# Patient Record
Sex: MALE | Race: WHITE | NOT HISPANIC OR LATINO
[De-identification: names, ages, dates, MRNs, and addresses within clinical notes are randomized per-mention and may not be internally consistent; named-entity substitution may affect disease eponyms.]

---

## 2018-09-17 PROBLEM — Z00.00 ENCOUNTER FOR PREVENTIVE HEALTH EXAMINATION: Status: ACTIVE | Noted: 2018-09-17

## 2018-09-19 ENCOUNTER — APPOINTMENT (OUTPATIENT)
Dept: UROLOGY | Facility: CLINIC | Age: 78
End: 2018-09-19
Payer: MEDICARE

## 2018-09-19 VITALS
TEMPERATURE: 98.1 F | SYSTOLIC BLOOD PRESSURE: 142 MMHG | OXYGEN SATURATION: 98 % | BODY MASS INDEX: 31.84 KG/M2 | DIASTOLIC BLOOD PRESSURE: 72 MMHG | HEART RATE: 90 BPM | HEIGHT: 69 IN | WEIGHT: 215 LBS

## 2018-09-19 DIAGNOSIS — Z78.9 OTHER SPECIFIED HEALTH STATUS: ICD-10-CM

## 2018-09-19 DIAGNOSIS — Z80.1 FAMILY HISTORY OF MALIGNANT NEOPLASM OF TRACHEA, BRONCHUS AND LUNG: ICD-10-CM

## 2018-09-19 DIAGNOSIS — Z86.79 PERSONAL HISTORY OF OTHER DISEASES OF THE CIRCULATORY SYSTEM: ICD-10-CM

## 2018-09-19 DIAGNOSIS — Z87.19 PERSONAL HISTORY OF OTHER DISEASES OF THE DIGESTIVE SYSTEM: ICD-10-CM

## 2018-09-19 PROCEDURE — 99204 OFFICE O/P NEW MOD 45 MIN: CPT

## 2018-09-19 RX ORDER — KRILL/OM-3/DHA/EPA/PHOSPHO/AST 1000-230MG
81 CAPSULE ORAL
Refills: 0 | Status: ACTIVE | COMMUNITY

## 2018-09-19 RX ORDER — AMLODIPINE BESYLATE 2.5 MG/1
2.5 TABLET ORAL
Refills: 0 | Status: ACTIVE | COMMUNITY

## 2018-09-20 LAB
APPEARANCE: CLEAR
BACTERIA: NEGATIVE
BILIRUBIN URINE: NEGATIVE
BLOOD URINE: NEGATIVE
COLOR: YELLOW
GLUCOSE QUALITATIVE U: NEGATIVE MG/DL
HYALINE CASTS: 2 /LPF
KETONES URINE: NEGATIVE
LEUKOCYTE ESTERASE URINE: NEGATIVE
MICROSCOPIC-UA: NORMAL
NITRITE URINE: NEGATIVE
PH URINE: 6
PROTEIN URINE: NEGATIVE MG/DL
RED BLOOD CELLS URINE: 4 /HPF
SPECIFIC GRAVITY URINE: 1.02
SQUAMOUS EPITHELIAL CELLS: 1 /HPF
UROBILINOGEN URINE: NEGATIVE MG/DL
WHITE BLOOD CELLS URINE: 1 /HPF

## 2018-09-25 ENCOUNTER — APPOINTMENT (OUTPATIENT)
Dept: UROLOGY | Facility: CLINIC | Age: 78
End: 2018-09-25
Payer: MEDICARE

## 2018-09-25 VITALS
OXYGEN SATURATION: 97 % | DIASTOLIC BLOOD PRESSURE: 78 MMHG | RESPIRATION RATE: 16 BRPM | SYSTOLIC BLOOD PRESSURE: 122 MMHG | HEART RATE: 73 BPM

## 2018-09-25 LAB
BACTERIA UR CULT: NORMAL
PSA FREE FLD-MCNC: 12.9
PSA FREE SERPL-MCNC: 1.04 NG/ML
PSA SERPL-MCNC: 8.07 NG/ML

## 2018-09-25 PROCEDURE — 99214 OFFICE O/P EST MOD 30 MIN: CPT

## 2018-09-25 RX ORDER — SILDENAFIL 20 MG/1
20 TABLET ORAL DAILY
Qty: 60 | Refills: 2 | Status: ACTIVE | COMMUNITY
Start: 2018-09-25 | End: 1900-01-01

## 2018-10-30 ENCOUNTER — APPOINTMENT (OUTPATIENT)
Dept: UROLOGY | Facility: CLINIC | Age: 78
End: 2018-10-30
Payer: MEDICARE

## 2018-10-30 VITALS
OXYGEN SATURATION: 97 % | DIASTOLIC BLOOD PRESSURE: 68 MMHG | SYSTOLIC BLOOD PRESSURE: 128 MMHG | BODY MASS INDEX: 31.4 KG/M2 | HEART RATE: 67 BPM | WEIGHT: 212 LBS | HEIGHT: 69 IN

## 2018-10-30 LAB
PSA FREE FLD-MCNC: 13.5
PSA FREE SERPL-MCNC: 0.92 NG/ML
PSA SERPL-MCNC: 6.82 NG/ML

## 2018-10-30 PROCEDURE — 99214 OFFICE O/P EST MOD 30 MIN: CPT

## 2019-03-12 ENCOUNTER — APPOINTMENT (OUTPATIENT)
Dept: UROLOGY | Facility: CLINIC | Age: 79
End: 2019-03-12

## 2019-04-24 ENCOUNTER — MESSAGE (OUTPATIENT)
Age: 79
End: 2019-04-24

## 2019-04-26 LAB — PSA SERPL-MCNC: 6.12 NG/ML

## 2019-05-01 ENCOUNTER — APPOINTMENT (OUTPATIENT)
Dept: UROLOGY | Facility: CLINIC | Age: 79
End: 2019-05-01
Payer: MEDICARE

## 2019-05-01 VITALS
SYSTOLIC BLOOD PRESSURE: 162 MMHG | DIASTOLIC BLOOD PRESSURE: 78 MMHG | RESPIRATION RATE: 16 BRPM | BODY MASS INDEX: 31.84 KG/M2 | WEIGHT: 215 LBS | HEIGHT: 69 IN | HEART RATE: 58 BPM

## 2019-05-01 DIAGNOSIS — N13.8 BENIGN PROSTATIC HYPERPLASIA WITH LOWER URINARY TRACT SYMPMS: ICD-10-CM

## 2019-05-01 DIAGNOSIS — R97.20 ELEVATED PROSTATE, SPECIFIC ANTIGEN [PSA]: ICD-10-CM

## 2019-05-01 DIAGNOSIS — N40.1 BENIGN PROSTATIC HYPERPLASIA WITH LOWER URINARY TRACT SYMPMS: ICD-10-CM

## 2019-05-01 DIAGNOSIS — N52.9 MALE ERECTILE DYSFUNCTION, UNSPECIFIED: ICD-10-CM

## 2019-05-01 PROCEDURE — 99214 OFFICE O/P EST MOD 30 MIN: CPT

## 2019-05-01 NOTE — PHYSICAL EXAM
[Normal Appearance] : normal appearance [General Appearance - Well Nourished] : well nourished [General Appearance - Well Developed] : well developed [General Appearance - In No Acute Distress] : no acute distress [Well Groomed] : well groomed [Abdomen Soft] : soft [Abdomen Tenderness] : non-tender [Costovertebral Angle Tenderness] : no ~M costovertebral angle tenderness [Urinary Bladder Findings] : the bladder was normal on palpation [Urethral Meatus] : meatus normal [Scrotum] : the scrotum was normal [Testes Mass (___cm)] : there were no testicular masses [No Prostate Nodules] : no prostate nodules [] : no respiratory distress [Edema] : no peripheral edema [Respiration, Rhythm And Depth] : normal respiratory rhythm and effort [Oriented To Time, Place, And Person] : oriented to person, place, and time [Exaggerated Use Of Accessory Muscles For Inspiration] : no accessory muscle use [Mood] : the mood was normal [Affect] : the affect was normal [Not Anxious] : not anxious [No Focal Deficits] : no focal deficits [Normal Station and Gait] : the gait and station were normal for the patient's age [No Palpable Adenopathy] : no palpable adenopathy

## 2019-05-01 NOTE — ASSESSMENT
[FreeTextEntry1] : Very pleasant 79-year-old gentleman presents for followup of elevated PSA and erectile\par -Labs reviewed with the patient\par -Given decreasing PSA, we will forego an additional workup for elevated PSA at this time\par -We had an extensive discussion of the potential options for treatment of erectile dysfunction\par -Patient is comfortable at this time and would like to forego initial treatments\par -Follow up in one year with PSA prior to visit at patient's request

## 2019-05-01 NOTE — HISTORY OF PRESENT ILLNESS
[FreeTextEntry1] : Very pleasant 79-year-old gentleman presents for followup of elevated PSA and erectile dysfunction. Patient feels well. He denies dysuria. No hematuria. Urinary symptoms are minimal. He recently had a PSA to 6.12. This is down from 6.8-6 months ago.\par \par Patient reports difficulty achieving an erection. He is tried up to 100 mg of sildenafil without significant improvement in his erections. [Urinary Frequency] : no urinary frequency [Urinary Urgency] : no urinary urgency [Urinary Retention] : no urinary retention [Nocturia] : no nocturia [Straining] : no straining [Erectile Dysfunction] : Erectile Dysfunction [Weak Stream] : no weak stream [Dysuria] : no dysuria [Hematuria - Gross] : no gross hematuria [Bladder Spasm] : no bladder spasm [Abdominal Pain] : no abdominal pain [Flank Pain] : no flank pain [Fever] : no fever [Nausea] : no nausea [Fatigue] : no fatigue [Anorexia] : no anorexia

## 2020-05-01 ENCOUNTER — APPOINTMENT (OUTPATIENT)
Dept: UROLOGY | Facility: CLINIC | Age: 80
End: 2020-05-01

## 2021-10-19 ENCOUNTER — APPOINTMENT (OUTPATIENT)
Dept: RADIOLOGY | Facility: HOSPITAL | Age: 81
End: 2021-10-19

## 2023-01-01 ENCOUNTER — APPOINTMENT (OUTPATIENT)
Dept: SURGICAL ONCOLOGY | Facility: CLINIC | Age: 83
End: 2023-01-01
Payer: MEDICARE

## 2023-01-01 ENCOUNTER — APPOINTMENT (OUTPATIENT)
Dept: SURGERY | Facility: CLINIC | Age: 83
End: 2023-01-01
Payer: MEDICARE

## 2023-01-01 VITALS
HEIGHT: 69 IN | BODY MASS INDEX: 27.11 KG/M2 | WEIGHT: 183 LBS | SYSTOLIC BLOOD PRESSURE: 140 MMHG | DIASTOLIC BLOOD PRESSURE: 63 MMHG | HEART RATE: 72 BPM

## 2023-01-01 VITALS
RESPIRATION RATE: 16 BRPM | OXYGEN SATURATION: 96 % | DIASTOLIC BLOOD PRESSURE: 62 MMHG | SYSTOLIC BLOOD PRESSURE: 133 MMHG | HEIGHT: 69 IN | WEIGHT: 183 LBS | HEART RATE: 96 BPM | BODY MASS INDEX: 27.11 KG/M2

## 2023-01-01 DIAGNOSIS — M19.90 UNSPECIFIED OSTEOARTHRITIS, UNSPECIFIED SITE: ICD-10-CM

## 2023-01-01 DIAGNOSIS — I63.9 CEREBRAL INFARCTION, UNSPECIFIED: ICD-10-CM

## 2023-01-01 DIAGNOSIS — R06.02 SHORTNESS OF BREATH: ICD-10-CM

## 2023-01-01 DIAGNOSIS — R22.41 LOCALIZED SWELLING, MASS AND LUMP, RIGHT LOWER LIMB: ICD-10-CM

## 2023-01-01 PROCEDURE — 99203 OFFICE O/P NEW LOW 30 MIN: CPT

## 2023-01-01 PROCEDURE — 99215 OFFICE O/P EST HI 40 MIN: CPT

## 2023-11-30 PROBLEM — I63.9 STROKE: Status: ACTIVE | Noted: 2023-01-01

## 2023-11-30 PROBLEM — M19.90 ARTHRITIS: Status: ACTIVE | Noted: 2023-01-01

## 2023-12-05 PROBLEM — R22.41 MASS OF RIGHT THIGH: Status: ACTIVE | Noted: 2023-01-01

## 2023-12-05 PROBLEM — R06.02 SHORTNESS OF BREATH AT REST: Status: RESOLVED | Noted: 2023-01-01 | Resolved: 2023-01-01

## 2024-01-01 ENCOUNTER — APPOINTMENT (OUTPATIENT)
Dept: SURGICAL ONCOLOGY | Facility: HOSPITAL | Age: 84
End: 2024-01-01

## 2024-01-01 ENCOUNTER — INPATIENT (INPATIENT)
Facility: HOSPITAL | Age: 84
LOS: 21 days | DRG: 542 | End: 2024-01-26
Attending: STUDENT IN AN ORGANIZED HEALTH CARE EDUCATION/TRAINING PROGRAM | Admitting: STUDENT IN AN ORGANIZED HEALTH CARE EDUCATION/TRAINING PROGRAM
Payer: COMMERCIAL

## 2024-01-01 VITALS — TEMPERATURE: 101 F

## 2024-01-01 VITALS
DIASTOLIC BLOOD PRESSURE: 58 MMHG | HEIGHT: 67 IN | HEART RATE: 96 BPM | TEMPERATURE: 98 F | SYSTOLIC BLOOD PRESSURE: 109 MMHG | RESPIRATION RATE: 17 BRPM | WEIGHT: 169.98 LBS | OXYGEN SATURATION: 97 %

## 2024-01-01 DIAGNOSIS — I10 ESSENTIAL (PRIMARY) HYPERTENSION: ICD-10-CM

## 2024-01-01 DIAGNOSIS — C79.9 SECONDARY MALIGNANT NEOPLASM OF UNSPECIFIED SITE: ICD-10-CM

## 2024-01-01 DIAGNOSIS — I48.91 UNSPECIFIED ATRIAL FIBRILLATION: ICD-10-CM

## 2024-01-01 DIAGNOSIS — Z02.9 ENCOUNTER FOR ADMINISTRATIVE EXAMINATIONS, UNSPECIFIED: ICD-10-CM

## 2024-01-01 DIAGNOSIS — E83.52 HYPERCALCEMIA: ICD-10-CM

## 2024-01-01 DIAGNOSIS — Z51.5 ENCOUNTER FOR PALLIATIVE CARE: ICD-10-CM

## 2024-01-01 DIAGNOSIS — F01.50 VASCULAR DEMENTIA WITHOUT BEHAVIORAL DISTURBANCE: ICD-10-CM

## 2024-01-01 DIAGNOSIS — D72.829 ELEVATED WHITE BLOOD CELL COUNT, UNSPECIFIED: ICD-10-CM

## 2024-01-01 DIAGNOSIS — E43 UNSPECIFIED SEVERE PROTEIN-CALORIE MALNUTRITION: ICD-10-CM

## 2024-01-01 DIAGNOSIS — Z29.9 ENCOUNTER FOR PROPHYLACTIC MEASURES, UNSPECIFIED: ICD-10-CM

## 2024-01-01 DIAGNOSIS — G89.3 NEOPLASM RELATED PAIN (ACUTE) (CHRONIC): ICD-10-CM

## 2024-01-01 DIAGNOSIS — R42 DIZZINESS AND GIDDINESS: ICD-10-CM

## 2024-01-01 DIAGNOSIS — E87.0 HYPEROSMOLALITY AND HYPERNATREMIA: ICD-10-CM

## 2024-01-01 DIAGNOSIS — J43.9 EMPHYSEMA, UNSPECIFIED: ICD-10-CM

## 2024-01-01 DIAGNOSIS — R26.2 DIFFICULTY IN WALKING, NOT ELSEWHERE CLASSIFIED: ICD-10-CM

## 2024-01-01 DIAGNOSIS — C80.1 MALIGNANT (PRIMARY) NEOPLASM, UNSPECIFIED: ICD-10-CM

## 2024-01-01 DIAGNOSIS — R45.1 RESTLESSNESS AND AGITATION: ICD-10-CM

## 2024-01-01 DIAGNOSIS — R53.1 WEAKNESS: ICD-10-CM

## 2024-01-01 DIAGNOSIS — Z86.73 PERSONAL HISTORY OF TRANSIENT ISCHEMIC ATTACK (TIA), AND CEREBRAL INFARCTION WITHOUT RESIDUAL DEFICITS: ICD-10-CM

## 2024-01-01 DIAGNOSIS — N40.0 BENIGN PROSTATIC HYPERPLASIA WITHOUT LOWER URINARY TRACT SYMPTOMS: ICD-10-CM

## 2024-01-01 LAB
ALBUMIN SERPL ELPH-MCNC: 1.2 G/DL — LOW (ref 3.5–5)
ALBUMIN SERPL ELPH-MCNC: 1.4 G/DL — LOW (ref 3.5–5)
ALBUMIN SERPL ELPH-MCNC: 1.5 G/DL — LOW (ref 3.5–5)
ALBUMIN SERPL ELPH-MCNC: 1.5 G/DL — LOW (ref 3.5–5)
ALBUMIN SERPL ELPH-MCNC: 1.6 G/DL — LOW (ref 3.5–5)
ALBUMIN SERPL ELPH-MCNC: 1.7 G/DL — LOW (ref 3.5–5)
ALBUMIN SERPL ELPH-MCNC: 1.7 G/DL — LOW (ref 3.5–5)
ALP SERPL-CCNC: 127 U/L — HIGH (ref 40–120)
ALP SERPL-CCNC: 127 U/L — HIGH (ref 40–120)
ALP SERPL-CCNC: 144 U/L — HIGH (ref 40–120)
ALP SERPL-CCNC: 154 U/L — HIGH (ref 40–120)
ALP SERPL-CCNC: 163 U/L — HIGH (ref 40–120)
ALP SERPL-CCNC: 163 U/L — HIGH (ref 40–120)
ALP SERPL-CCNC: 178 U/L — HIGH (ref 40–120)
ALP SERPL-CCNC: 219 U/L — HIGH (ref 40–120)
ALP SERPL-CCNC: 219 U/L — HIGH (ref 40–120)
ALT FLD-CCNC: 15 U/L DA — SIGNIFICANT CHANGE UP (ref 10–60)
ALT FLD-CCNC: 18 U/L DA — SIGNIFICANT CHANGE UP (ref 10–60)
ALT FLD-CCNC: 19 U/L DA — SIGNIFICANT CHANGE UP (ref 10–60)
ALT FLD-CCNC: 19 U/L DA — SIGNIFICANT CHANGE UP (ref 10–60)
ALT FLD-CCNC: 20 U/L DA — SIGNIFICANT CHANGE UP (ref 10–60)
ALT FLD-CCNC: 20 U/L DA — SIGNIFICANT CHANGE UP (ref 10–60)
ALT FLD-CCNC: 23 U/L DA — SIGNIFICANT CHANGE UP (ref 10–60)
ALT FLD-CCNC: 23 U/L DA — SIGNIFICANT CHANGE UP (ref 10–60)
ALT FLD-CCNC: 24 U/L DA — SIGNIFICANT CHANGE UP (ref 10–60)
ALT FLD-CCNC: 24 U/L DA — SIGNIFICANT CHANGE UP (ref 10–60)
ANION GAP SERPL CALC-SCNC: 11 MMOL/L — SIGNIFICANT CHANGE UP (ref 5–17)
ANION GAP SERPL CALC-SCNC: 3 MMOL/L — LOW (ref 5–17)
ANION GAP SERPL CALC-SCNC: 3 MMOL/L — LOW (ref 5–17)
ANION GAP SERPL CALC-SCNC: 4 MMOL/L — LOW (ref 5–17)
ANION GAP SERPL CALC-SCNC: 4 MMOL/L — LOW (ref 5–17)
ANION GAP SERPL CALC-SCNC: 5 MMOL/L — SIGNIFICANT CHANGE UP (ref 5–17)
ANION GAP SERPL CALC-SCNC: 5 MMOL/L — SIGNIFICANT CHANGE UP (ref 5–17)
ANION GAP SERPL CALC-SCNC: 6 MMOL/L — SIGNIFICANT CHANGE UP (ref 5–17)
ANION GAP SERPL CALC-SCNC: 6 MMOL/L — SIGNIFICANT CHANGE UP (ref 5–17)
ANION GAP SERPL CALC-SCNC: 7 MMOL/L — SIGNIFICANT CHANGE UP (ref 5–17)
ANION GAP SERPL CALC-SCNC: 7 MMOL/L — SIGNIFICANT CHANGE UP (ref 5–17)
ANION GAP SERPL CALC-SCNC: 8 MMOL/L — SIGNIFICANT CHANGE UP (ref 5–17)
ANION GAP SERPL CALC-SCNC: 8 MMOL/L — SIGNIFICANT CHANGE UP (ref 5–17)
ANION GAP SERPL CALC-SCNC: 9 MMOL/L — SIGNIFICANT CHANGE UP (ref 5–17)
ANION GAP SERPL CALC-SCNC: 9 MMOL/L — SIGNIFICANT CHANGE UP (ref 5–17)
ANISOCYTOSIS BLD QL: SLIGHT — SIGNIFICANT CHANGE UP
APPEARANCE UR: ABNORMAL
APPEARANCE UR: ABNORMAL
APTT BLD: 40.3 SEC — HIGH (ref 24.5–35.6)
APTT BLD: 40.3 SEC — HIGH (ref 24.5–35.6)
AST SERPL-CCNC: 10 U/L — SIGNIFICANT CHANGE UP (ref 10–40)
AST SERPL-CCNC: 11 U/L — SIGNIFICANT CHANGE UP (ref 10–40)
AST SERPL-CCNC: 11 U/L — SIGNIFICANT CHANGE UP (ref 10–40)
AST SERPL-CCNC: 16 U/L — SIGNIFICANT CHANGE UP (ref 10–40)
AST SERPL-CCNC: 16 U/L — SIGNIFICANT CHANGE UP (ref 10–40)
AST SERPL-CCNC: 22 U/L — SIGNIFICANT CHANGE UP (ref 10–40)
AST SERPL-CCNC: 22 U/L — SIGNIFICANT CHANGE UP (ref 10–40)
AST SERPL-CCNC: 24 U/L — SIGNIFICANT CHANGE UP (ref 10–40)
AST SERPL-CCNC: 24 U/L — SIGNIFICANT CHANGE UP (ref 10–40)
AST SERPL-CCNC: 25 U/L — SIGNIFICANT CHANGE UP (ref 10–40)
AST SERPL-CCNC: 27 U/L — SIGNIFICANT CHANGE UP (ref 10–40)
AST SERPL-CCNC: 27 U/L — SIGNIFICANT CHANGE UP (ref 10–40)
AST SERPL-CCNC: 29 U/L — SIGNIFICANT CHANGE UP (ref 10–40)
AST SERPL-CCNC: 29 U/L — SIGNIFICANT CHANGE UP (ref 10–40)
BASOPHILS # BLD AUTO: 0 K/UL — SIGNIFICANT CHANGE UP (ref 0–0.2)
BASOPHILS # BLD AUTO: 0.1 K/UL — SIGNIFICANT CHANGE UP (ref 0–0.2)
BASOPHILS # BLD AUTO: 0.1 K/UL — SIGNIFICANT CHANGE UP (ref 0–0.2)
BASOPHILS # BLD AUTO: 0.35 K/UL — HIGH (ref 0–0.2)
BASOPHILS NFR BLD AUTO: 0 % — SIGNIFICANT CHANGE UP (ref 0–2)
BASOPHILS NFR BLD AUTO: 0.2 % — SIGNIFICANT CHANGE UP (ref 0–2)
BASOPHILS NFR BLD AUTO: 0.2 % — SIGNIFICANT CHANGE UP (ref 0–2)
BASOPHILS NFR BLD AUTO: 0.7 % — SIGNIFICANT CHANGE UP (ref 0–2)
BILIRUB DIRECT SERPL-MCNC: 0.3 MG/DL — SIGNIFICANT CHANGE UP (ref 0–0.3)
BILIRUB DIRECT SERPL-MCNC: 0.3 MG/DL — SIGNIFICANT CHANGE UP (ref 0–0.3)
BILIRUB INDIRECT FLD-MCNC: 0.3 MG/DL — SIGNIFICANT CHANGE UP (ref 0.2–1)
BILIRUB INDIRECT FLD-MCNC: 0.3 MG/DL — SIGNIFICANT CHANGE UP (ref 0.2–1)
BILIRUB SERPL-MCNC: 0.6 MG/DL — SIGNIFICANT CHANGE UP (ref 0.2–1.2)
BILIRUB SERPL-MCNC: 0.6 MG/DL — SIGNIFICANT CHANGE UP (ref 0.2–1.2)
BILIRUB SERPL-MCNC: 0.7 MG/DL — SIGNIFICANT CHANGE UP (ref 0.2–1.2)
BILIRUB SERPL-MCNC: 0.7 MG/DL — SIGNIFICANT CHANGE UP (ref 0.2–1.2)
BILIRUB SERPL-MCNC: 0.8 MG/DL — SIGNIFICANT CHANGE UP (ref 0.2–1.2)
BILIRUB SERPL-MCNC: 1 MG/DL — SIGNIFICANT CHANGE UP (ref 0.2–1.2)
BILIRUB SERPL-MCNC: 1 MG/DL — SIGNIFICANT CHANGE UP (ref 0.2–1.2)
BILIRUB UR-MCNC: NEGATIVE — SIGNIFICANT CHANGE UP
BILIRUB UR-MCNC: NEGATIVE — SIGNIFICANT CHANGE UP
BUN SERPL-MCNC: 12 MG/DL — SIGNIFICANT CHANGE UP (ref 7–18)
BUN SERPL-MCNC: 12 MG/DL — SIGNIFICANT CHANGE UP (ref 7–18)
BUN SERPL-MCNC: 14 MG/DL — SIGNIFICANT CHANGE UP (ref 7–18)
BUN SERPL-MCNC: 14 MG/DL — SIGNIFICANT CHANGE UP (ref 7–18)
BUN SERPL-MCNC: 16 MG/DL — SIGNIFICANT CHANGE UP (ref 7–18)
BUN SERPL-MCNC: 19 MG/DL — HIGH (ref 7–18)
BUN SERPL-MCNC: 19 MG/DL — HIGH (ref 7–18)
BUN SERPL-MCNC: 24 MG/DL — HIGH (ref 7–18)
BUN SERPL-MCNC: 25 MG/DL — HIGH (ref 7–18)
BUN SERPL-MCNC: 25 MG/DL — HIGH (ref 7–18)
BUN SERPL-MCNC: 27 MG/DL — HIGH (ref 7–18)
BUN SERPL-MCNC: 29 MG/DL — HIGH (ref 7–18)
BUN SERPL-MCNC: 29 MG/DL — HIGH (ref 7–18)
BUN SERPL-MCNC: 31 MG/DL — HIGH (ref 7–18)
BUN SERPL-MCNC: 31 MG/DL — HIGH (ref 7–18)
BURR CELLS BLD QL SMEAR: PRESENT — SIGNIFICANT CHANGE UP
CA-I BLD-SCNC: 5.7 MG/DL — HIGH (ref 4.5–5.6)
CA-I BLD-SCNC: 5.7 MG/DL — HIGH (ref 4.5–5.6)
CALCIUM SERPL-MCNC: 10.2 MG/DL — SIGNIFICANT CHANGE UP (ref 8.4–10.5)
CALCIUM SERPL-MCNC: 10.2 MG/DL — SIGNIFICANT CHANGE UP (ref 8.4–10.5)
CALCIUM SERPL-MCNC: 10.4 MG/DL — SIGNIFICANT CHANGE UP (ref 8.4–10.5)
CALCIUM SERPL-MCNC: 10.4 MG/DL — SIGNIFICANT CHANGE UP (ref 8.4–10.5)
CALCIUM SERPL-MCNC: 10.7 MG/DL — HIGH (ref 8.4–10.5)
CALCIUM SERPL-MCNC: 10.9 MG/DL — HIGH (ref 8.4–10.5)
CALCIUM SERPL-MCNC: 11.1 MG/DL — HIGH (ref 8.4–10.5)
CALCIUM SERPL-MCNC: 11.2 MG/DL — HIGH (ref 8.4–10.5)
CALCIUM SERPL-MCNC: 11.6 MG/DL — HIGH (ref 8.4–10.5)
CALCIUM SERPL-MCNC: 11.6 MG/DL — HIGH (ref 8.4–10.5)
CALCIUM SERPL-MCNC: 9.6 MG/DL — SIGNIFICANT CHANGE UP (ref 8.4–10.5)
CALCIUM SERPL-MCNC: 9.6 MG/DL — SIGNIFICANT CHANGE UP (ref 8.4–10.5)
CEA SERPL-MCNC: 0.7 NG/ML — SIGNIFICANT CHANGE UP (ref 0–3.8)
CEA SERPL-MCNC: 0.7 NG/ML — SIGNIFICANT CHANGE UP (ref 0–3.8)
CHLORIDE SERPL-SCNC: 102 MMOL/L — SIGNIFICANT CHANGE UP (ref 96–108)
CHLORIDE SERPL-SCNC: 102 MMOL/L — SIGNIFICANT CHANGE UP (ref 96–108)
CHLORIDE SERPL-SCNC: 105 MMOL/L — SIGNIFICANT CHANGE UP (ref 96–108)
CHLORIDE SERPL-SCNC: 106 MMOL/L — SIGNIFICANT CHANGE UP (ref 96–108)
CHLORIDE SERPL-SCNC: 106 MMOL/L — SIGNIFICANT CHANGE UP (ref 96–108)
CHLORIDE SERPL-SCNC: 108 MMOL/L — SIGNIFICANT CHANGE UP (ref 96–108)
CHLORIDE SERPL-SCNC: 108 MMOL/L — SIGNIFICANT CHANGE UP (ref 96–108)
CHLORIDE SERPL-SCNC: 111 MMOL/L — HIGH (ref 96–108)
CHLORIDE SERPL-SCNC: 111 MMOL/L — HIGH (ref 96–108)
CHLORIDE SERPL-SCNC: 116 MMOL/L — HIGH (ref 96–108)
CHLORIDE SERPL-SCNC: 118 MMOL/L — HIGH (ref 96–108)
CHLORIDE SERPL-SCNC: 122 MMOL/L — HIGH (ref 96–108)
CHLORIDE SERPL-SCNC: 122 MMOL/L — HIGH (ref 96–108)
CO2 SERPL-SCNC: 18 MMOL/L — LOW (ref 22–31)
CO2 SERPL-SCNC: 18 MMOL/L — LOW (ref 22–31)
CO2 SERPL-SCNC: 25 MMOL/L — SIGNIFICANT CHANGE UP (ref 22–31)
CO2 SERPL-SCNC: 26 MMOL/L — SIGNIFICANT CHANGE UP (ref 22–31)
CO2 SERPL-SCNC: 27 MMOL/L — SIGNIFICANT CHANGE UP (ref 22–31)
CO2 SERPL-SCNC: 28 MMOL/L — SIGNIFICANT CHANGE UP (ref 22–31)
CO2 SERPL-SCNC: 28 MMOL/L — SIGNIFICANT CHANGE UP (ref 22–31)
CO2 SERPL-SCNC: 29 MMOL/L — SIGNIFICANT CHANGE UP (ref 22–31)
COLOR SPEC: SIGNIFICANT CHANGE UP
COLOR SPEC: SIGNIFICANT CHANGE UP
COMMENT - URINE: SIGNIFICANT CHANGE UP
COMMENT - URINE: SIGNIFICANT CHANGE UP
CREAT SERPL-MCNC: 0.79 MG/DL — SIGNIFICANT CHANGE UP (ref 0.5–1.3)
CREAT SERPL-MCNC: 0.79 MG/DL — SIGNIFICANT CHANGE UP (ref 0.5–1.3)
CREAT SERPL-MCNC: 0.8 MG/DL — SIGNIFICANT CHANGE UP (ref 0.5–1.3)
CREAT SERPL-MCNC: 0.81 MG/DL — SIGNIFICANT CHANGE UP (ref 0.5–1.3)
CREAT SERPL-MCNC: 0.81 MG/DL — SIGNIFICANT CHANGE UP (ref 0.5–1.3)
CREAT SERPL-MCNC: 0.82 MG/DL — SIGNIFICANT CHANGE UP (ref 0.5–1.3)
CREAT SERPL-MCNC: 0.82 MG/DL — SIGNIFICANT CHANGE UP (ref 0.5–1.3)
CREAT SERPL-MCNC: 0.9 MG/DL — SIGNIFICANT CHANGE UP (ref 0.5–1.3)
CREAT SERPL-MCNC: 0.9 MG/DL — SIGNIFICANT CHANGE UP (ref 0.5–1.3)
CREAT SERPL-MCNC: 0.91 MG/DL — SIGNIFICANT CHANGE UP (ref 0.5–1.3)
CREAT SERPL-MCNC: 0.91 MG/DL — SIGNIFICANT CHANGE UP (ref 0.5–1.3)
CREAT SERPL-MCNC: 0.92 MG/DL — SIGNIFICANT CHANGE UP (ref 0.5–1.3)
CREAT SERPL-MCNC: 0.92 MG/DL — SIGNIFICANT CHANGE UP (ref 0.5–1.3)
CREAT SERPL-MCNC: 0.95 MG/DL — SIGNIFICANT CHANGE UP (ref 0.5–1.3)
CREAT SERPL-MCNC: 0.95 MG/DL — SIGNIFICANT CHANGE UP (ref 0.5–1.3)
CREAT SERPL-MCNC: 0.96 MG/DL — SIGNIFICANT CHANGE UP (ref 0.5–1.3)
CREAT SERPL-MCNC: 0.99 MG/DL — SIGNIFICANT CHANGE UP (ref 0.5–1.3)
CREAT SERPL-MCNC: 0.99 MG/DL — SIGNIFICANT CHANGE UP (ref 0.5–1.3)
CRP SERPL-MCNC: 200 MG/L — HIGH
CRP SERPL-MCNC: 200 MG/L — HIGH
DIFF PNL FLD: NEGATIVE — SIGNIFICANT CHANGE UP
DIFF PNL FLD: NEGATIVE — SIGNIFICANT CHANGE UP
EGFR: 75 ML/MIN/1.73M2 — SIGNIFICANT CHANGE UP
EGFR: 75 ML/MIN/1.73M2 — SIGNIFICANT CHANGE UP
EGFR: 77 ML/MIN/1.73M2 — SIGNIFICANT CHANGE UP
EGFR: 78 ML/MIN/1.73M2 — SIGNIFICANT CHANGE UP
EGFR: 78 ML/MIN/1.73M2 — SIGNIFICANT CHANGE UP
EGFR: 82 ML/MIN/1.73M2 — SIGNIFICANT CHANGE UP
EGFR: 82 ML/MIN/1.73M2 — SIGNIFICANT CHANGE UP
EGFR: 83 ML/MIN/1.73M2 — SIGNIFICANT CHANGE UP
EGFR: 83 ML/MIN/1.73M2 — SIGNIFICANT CHANGE UP
EGFR: 84 ML/MIN/1.73M2 — SIGNIFICANT CHANGE UP
EGFR: 84 ML/MIN/1.73M2 — SIGNIFICANT CHANGE UP
EGFR: 86 ML/MIN/1.73M2 — SIGNIFICANT CHANGE UP
EGFR: 87 ML/MIN/1.73M2 — SIGNIFICANT CHANGE UP
ELLIPTOCYTES BLD QL SMEAR: SLIGHT — SIGNIFICANT CHANGE UP
EOSINOPHIL # BLD AUTO: 1.92 K/UL — HIGH (ref 0–0.5)
EOSINOPHIL # BLD AUTO: 1.92 K/UL — HIGH (ref 0–0.5)
EOSINOPHIL # BLD AUTO: 2.21 K/UL — HIGH (ref 0–0.5)
EOSINOPHIL # BLD AUTO: 2.21 K/UL — HIGH (ref 0–0.5)
EOSINOPHIL # BLD AUTO: 2.54 K/UL — HIGH (ref 0–0.5)
EOSINOPHIL # BLD AUTO: 3.27 K/UL — HIGH (ref 0–0.5)
EOSINOPHIL # BLD AUTO: 3.27 K/UL — HIGH (ref 0–0.5)
EOSINOPHIL # BLD AUTO: 4.39 K/UL — HIGH (ref 0–0.5)
EOSINOPHIL NFR BLD AUTO: 4 % — SIGNIFICANT CHANGE UP (ref 0–6)
EOSINOPHIL NFR BLD AUTO: 5.4 % — SIGNIFICANT CHANGE UP (ref 0–6)
EOSINOPHIL NFR BLD AUTO: 6.5 % — HIGH (ref 0–6)
EOSINOPHIL NFR BLD AUTO: 6.5 % — HIGH (ref 0–6)
EOSINOPHIL NFR BLD AUTO: 8 % — HIGH (ref 0–6)
ERYTHROCYTE [SEDIMENTATION RATE] IN BLOOD: 97 MM/HR — HIGH (ref 0–20)
ERYTHROCYTE [SEDIMENTATION RATE] IN BLOOD: 97 MM/HR — HIGH (ref 0–20)
FERRITIN SERPL-MCNC: 1282 NG/ML — HIGH (ref 30–400)
FERRITIN SERPL-MCNC: 1282 NG/ML — HIGH (ref 30–400)
FIBRINOGEN AG PPP IA-MCNC: 582 MG/DL — HIGH (ref 233–496)
FIBRINOGEN AG PPP IA-MCNC: 582 MG/DL — HIGH (ref 233–496)
GLUCOSE BLDC GLUCOMTR-MCNC: 112 MG/DL — HIGH (ref 70–99)
GLUCOSE BLDC GLUCOMTR-MCNC: 112 MG/DL — HIGH (ref 70–99)
GLUCOSE SERPL-MCNC: 112 MG/DL — HIGH (ref 70–99)
GLUCOSE SERPL-MCNC: 112 MG/DL — HIGH (ref 70–99)
GLUCOSE SERPL-MCNC: 115 MG/DL — HIGH (ref 70–99)
GLUCOSE SERPL-MCNC: 124 MG/DL — HIGH (ref 70–99)
GLUCOSE SERPL-MCNC: 124 MG/DL — HIGH (ref 70–99)
GLUCOSE SERPL-MCNC: 125 MG/DL — HIGH (ref 70–99)
GLUCOSE SERPL-MCNC: 56 MG/DL — LOW (ref 70–99)
GLUCOSE SERPL-MCNC: 56 MG/DL — LOW (ref 70–99)
GLUCOSE SERPL-MCNC: 73 MG/DL — SIGNIFICANT CHANGE UP (ref 70–99)
GLUCOSE SERPL-MCNC: 73 MG/DL — SIGNIFICANT CHANGE UP (ref 70–99)
GLUCOSE SERPL-MCNC: 78 MG/DL — SIGNIFICANT CHANGE UP (ref 70–99)
GLUCOSE SERPL-MCNC: 78 MG/DL — SIGNIFICANT CHANGE UP (ref 70–99)
GLUCOSE SERPL-MCNC: 94 MG/DL — SIGNIFICANT CHANGE UP (ref 70–99)
GLUCOSE SERPL-MCNC: 96 MG/DL — SIGNIFICANT CHANGE UP (ref 70–99)
GLUCOSE SERPL-MCNC: 96 MG/DL — SIGNIFICANT CHANGE UP (ref 70–99)
GLUCOSE UR QL: NEGATIVE MG/DL — SIGNIFICANT CHANGE UP
GLUCOSE UR QL: NEGATIVE MG/DL — SIGNIFICANT CHANGE UP
HAPTOGLOB SERPL-MCNC: 296 MG/DL — HIGH (ref 34–200)
HAPTOGLOB SERPL-MCNC: 296 MG/DL — HIGH (ref 34–200)
HCT VFR BLD CALC: 26.4 % — LOW (ref 39–50)
HCT VFR BLD CALC: 26.4 % — LOW (ref 39–50)
HCT VFR BLD CALC: 27.1 % — LOW (ref 39–50)
HCT VFR BLD CALC: 27.1 % — LOW (ref 39–50)
HCT VFR BLD CALC: 27.2 % — LOW (ref 39–50)
HCT VFR BLD CALC: 27.2 % — LOW (ref 39–50)
HCT VFR BLD CALC: 27.7 % — LOW (ref 39–50)
HCT VFR BLD CALC: 27.7 % — LOW (ref 39–50)
HCT VFR BLD CALC: 27.8 % — LOW (ref 39–50)
HCT VFR BLD CALC: 28.5 % — LOW (ref 39–50)
HCT VFR BLD CALC: 28.5 % — LOW (ref 39–50)
HCT VFR BLD CALC: 28.6 % — LOW (ref 39–50)
HCT VFR BLD CALC: 28.6 % — LOW (ref 39–50)
HCT VFR BLD CALC: 29.6 % — LOW (ref 39–50)
HCT VFR BLD CALC: 29.6 % — LOW (ref 39–50)
HCT VFR BLD CALC: 30.2 % — LOW (ref 39–50)
HCT VFR BLD CALC: 30.2 % — LOW (ref 39–50)
HCT VFR BLD CALC: 31.3 % — LOW (ref 39–50)
HCT VFR BLD CALC: 31.3 % — LOW (ref 39–50)
HCT VFR BLD CALC: 31.4 % — LOW (ref 39–50)
HCT VFR BLD CALC: 38.3 % — LOW (ref 39–50)
HCT VFR BLD CALC: 38.3 % — LOW (ref 39–50)
HGB BLD-MCNC: 10.7 G/DL — LOW (ref 13–17)
HGB BLD-MCNC: 10.7 G/DL — LOW (ref 13–17)
HGB BLD-MCNC: 8.1 G/DL — LOW (ref 13–17)
HGB BLD-MCNC: 8.1 G/DL — LOW (ref 13–17)
HGB BLD-MCNC: 8.2 G/DL — LOW (ref 13–17)
HGB BLD-MCNC: 8.4 G/DL — LOW (ref 13–17)
HGB BLD-MCNC: 8.5 G/DL — LOW (ref 13–17)
HGB BLD-MCNC: 8.5 G/DL — LOW (ref 13–17)
HGB BLD-MCNC: 8.6 G/DL — LOW (ref 13–17)
HGB BLD-MCNC: 8.6 G/DL — LOW (ref 13–17)
HGB BLD-MCNC: 8.9 G/DL — LOW (ref 13–17)
HGB BLD-MCNC: 9.1 G/DL — LOW (ref 13–17)
HGB BLD-MCNC: 9.3 G/DL — LOW (ref 13–17)
HGB BLD-MCNC: 9.3 G/DL — LOW (ref 13–17)
HGB BLD-MCNC: 9.4 G/DL — LOW (ref 13–17)
HGB BLD-MCNC: 9.4 G/DL — LOW (ref 13–17)
HYPOCHROMIA BLD QL: SLIGHT — SIGNIFICANT CHANGE UP
HYPOSEGMENTATION: PRESENT — SIGNIFICANT CHANGE UP
IMM GRANULOCYTES NFR BLD AUTO: 3.3 % — HIGH (ref 0–0.9)
IMM GRANULOCYTES NFR BLD AUTO: 4.3 % — HIGH (ref 0–0.9)
IMM GRANULOCYTES NFR BLD AUTO: 4.3 % — HIGH (ref 0–0.9)
INR BLD: 1.49 RATIO — HIGH (ref 0.85–1.18)
INR BLD: 1.49 RATIO — HIGH (ref 0.85–1.18)
IRON SATN MFR SERPL: 13 % — LOW (ref 20–55)
IRON SATN MFR SERPL: 13 % — LOW (ref 20–55)
IRON SATN MFR SERPL: 24 UG/DL — LOW (ref 65–170)
IRON SATN MFR SERPL: 24 UG/DL — LOW (ref 65–170)
KETONES UR-MCNC: ABNORMAL MG/DL
KETONES UR-MCNC: ABNORMAL MG/DL
LDH SERPL L TO P-CCNC: 183 U/L — SIGNIFICANT CHANGE UP (ref 120–225)
LDH SERPL L TO P-CCNC: 183 U/L — SIGNIFICANT CHANGE UP (ref 120–225)
LEUKOCYTE ESTERASE UR-ACNC: NEGATIVE — SIGNIFICANT CHANGE UP
LEUKOCYTE ESTERASE UR-ACNC: NEGATIVE — SIGNIFICANT CHANGE UP
LYMPHOCYTES # BLD AUTO: 1.11 K/UL — SIGNIFICANT CHANGE UP (ref 1–3.3)
LYMPHOCYTES # BLD AUTO: 1.11 K/UL — SIGNIFICANT CHANGE UP (ref 1–3.3)
LYMPHOCYTES # BLD AUTO: 1.8 K/UL — SIGNIFICANT CHANGE UP (ref 1–3.3)
LYMPHOCYTES # BLD AUTO: 2 % — LOW (ref 13–44)
LYMPHOCYTES # BLD AUTO: 2 % — LOW (ref 13–44)
LYMPHOCYTES # BLD AUTO: 2.01 K/UL — SIGNIFICANT CHANGE UP (ref 1–3.3)
LYMPHOCYTES # BLD AUTO: 2.01 K/UL — SIGNIFICANT CHANGE UP (ref 1–3.3)
LYMPHOCYTES # BLD AUTO: 2.19 K/UL — SIGNIFICANT CHANGE UP (ref 1–3.3)
LYMPHOCYTES # BLD AUTO: 2.88 K/UL — SIGNIFICANT CHANGE UP (ref 1–3.3)
LYMPHOCYTES # BLD AUTO: 2.88 K/UL — SIGNIFICANT CHANGE UP (ref 1–3.3)
LYMPHOCYTES # BLD AUTO: 3.8 % — LOW (ref 13–44)
LYMPHOCYTES # BLD AUTO: 4 % — LOW (ref 13–44)
LYMPHOCYTES # BLD AUTO: 6 % — LOW (ref 13–44)
LYMPHOCYTES # BLD AUTO: 6 % — LOW (ref 13–44)
MACROCYTES BLD QL: SLIGHT — SIGNIFICANT CHANGE UP
MAGNESIUM SERPL-MCNC: 1.8 MG/DL — SIGNIFICANT CHANGE UP (ref 1.6–2.6)
MAGNESIUM SERPL-MCNC: 1.8 MG/DL — SIGNIFICANT CHANGE UP (ref 1.6–2.6)
MAGNESIUM SERPL-MCNC: 1.9 MG/DL — SIGNIFICANT CHANGE UP (ref 1.6–2.6)
MAGNESIUM SERPL-MCNC: 2.2 MG/DL — SIGNIFICANT CHANGE UP (ref 1.6–2.6)
MANUAL SMEAR VERIFICATION: SIGNIFICANT CHANGE UP
MCHC RBC-ENTMCNC: 27.9 GM/DL — LOW (ref 32–36)
MCHC RBC-ENTMCNC: 27.9 GM/DL — LOW (ref 32–36)
MCHC RBC-ENTMCNC: 28.1 PG — SIGNIFICANT CHANGE UP (ref 27–34)
MCHC RBC-ENTMCNC: 28.3 PG — SIGNIFICANT CHANGE UP (ref 27–34)
MCHC RBC-ENTMCNC: 28.3 PG — SIGNIFICANT CHANGE UP (ref 27–34)
MCHC RBC-ENTMCNC: 28.5 PG — SIGNIFICANT CHANGE UP (ref 27–34)
MCHC RBC-ENTMCNC: 28.7 PG — SIGNIFICANT CHANGE UP (ref 27–34)
MCHC RBC-ENTMCNC: 28.8 PG — SIGNIFICANT CHANGE UP (ref 27–34)
MCHC RBC-ENTMCNC: 28.9 PG — SIGNIFICANT CHANGE UP (ref 27–34)
MCHC RBC-ENTMCNC: 28.9 PG — SIGNIFICANT CHANGE UP (ref 27–34)
MCHC RBC-ENTMCNC: 29 GM/DL — LOW (ref 32–36)
MCHC RBC-ENTMCNC: 29.5 GM/DL — LOW (ref 32–36)
MCHC RBC-ENTMCNC: 30 GM/DL — LOW (ref 32–36)
MCHC RBC-ENTMCNC: 30 GM/DL — LOW (ref 32–36)
MCHC RBC-ENTMCNC: 30.1 GM/DL — LOW (ref 32–36)
MCHC RBC-ENTMCNC: 30.1 GM/DL — LOW (ref 32–36)
MCHC RBC-ENTMCNC: 30.2 GM/DL — LOW (ref 32–36)
MCHC RBC-ENTMCNC: 30.2 GM/DL — LOW (ref 32–36)
MCHC RBC-ENTMCNC: 30.7 GM/DL — LOW (ref 32–36)
MCHC RBC-ENTMCNC: 30.8 GM/DL — LOW (ref 32–36)
MCHC RBC-ENTMCNC: 30.8 GM/DL — LOW (ref 32–36)
MCHC RBC-ENTMCNC: 30.9 GM/DL — LOW (ref 32–36)
MCHC RBC-ENTMCNC: 30.9 GM/DL — LOW (ref 32–36)
MCHC RBC-ENTMCNC: 31 GM/DL — LOW (ref 32–36)
MCHC RBC-ENTMCNC: 31 GM/DL — LOW (ref 32–36)
MCHC RBC-ENTMCNC: 31.1 GM/DL — LOW (ref 32–36)
MCHC RBC-ENTMCNC: 31.1 GM/DL — LOW (ref 32–36)
MCV RBC AUTO: 102.7 FL — HIGH (ref 80–100)
MCV RBC AUTO: 102.7 FL — HIGH (ref 80–100)
MCV RBC AUTO: 91.7 FL — SIGNIFICANT CHANGE UP (ref 80–100)
MCV RBC AUTO: 91.7 FL — SIGNIFICANT CHANGE UP (ref 80–100)
MCV RBC AUTO: 91.8 FL — SIGNIFICANT CHANGE UP (ref 80–100)
MCV RBC AUTO: 91.8 FL — SIGNIFICANT CHANGE UP (ref 80–100)
MCV RBC AUTO: 92.5 FL — SIGNIFICANT CHANGE UP (ref 80–100)
MCV RBC AUTO: 92.5 FL — SIGNIFICANT CHANGE UP (ref 80–100)
MCV RBC AUTO: 92.6 FL — SIGNIFICANT CHANGE UP (ref 80–100)
MCV RBC AUTO: 92.6 FL — SIGNIFICANT CHANGE UP (ref 80–100)
MCV RBC AUTO: 93 FL — SIGNIFICANT CHANGE UP (ref 80–100)
MCV RBC AUTO: 93 FL — SIGNIFICANT CHANGE UP (ref 80–100)
MCV RBC AUTO: 93.4 FL — SIGNIFICANT CHANGE UP (ref 80–100)
MCV RBC AUTO: 93.4 FL — SIGNIFICANT CHANGE UP (ref 80–100)
MCV RBC AUTO: 93.5 FL — SIGNIFICANT CHANGE UP (ref 80–100)
MCV RBC AUTO: 93.5 FL — SIGNIFICANT CHANGE UP (ref 80–100)
MCV RBC AUTO: 94.4 FL — SIGNIFICANT CHANGE UP (ref 80–100)
MCV RBC AUTO: 94.4 FL — SIGNIFICANT CHANGE UP (ref 80–100)
MCV RBC AUTO: 95.8 FL — SIGNIFICANT CHANGE UP (ref 80–100)
MCV RBC AUTO: 95.8 FL — SIGNIFICANT CHANGE UP (ref 80–100)
MCV RBC AUTO: 97.5 FL — SIGNIFICANT CHANGE UP (ref 80–100)
MCV RBC AUTO: 99.4 FL — SIGNIFICANT CHANGE UP (ref 80–100)
MICROCYTES BLD QL: SLIGHT — SIGNIFICANT CHANGE UP
MONOCYTES # BLD AUTO: 0.48 K/UL — SIGNIFICANT CHANGE UP (ref 0–0.9)
MONOCYTES # BLD AUTO: 0.48 K/UL — SIGNIFICANT CHANGE UP (ref 0–0.9)
MONOCYTES # BLD AUTO: 1.1 K/UL — HIGH (ref 0–0.9)
MONOCYTES # BLD AUTO: 1.44 K/UL — HIGH (ref 0–0.9)
MONOCYTES # BLD AUTO: 1.66 K/UL — HIGH (ref 0–0.9)
MONOCYTES NFR BLD AUTO: 1 % — LOW (ref 2–14)
MONOCYTES NFR BLD AUTO: 1 % — LOW (ref 2–14)
MONOCYTES NFR BLD AUTO: 2 % — SIGNIFICANT CHANGE UP (ref 2–14)
MONOCYTES NFR BLD AUTO: 3 % — SIGNIFICANT CHANGE UP (ref 2–14)
MONOCYTES NFR BLD AUTO: 3 % — SIGNIFICANT CHANGE UP (ref 2–14)
MONOCYTES NFR BLD AUTO: 3.1 % — SIGNIFICANT CHANGE UP (ref 2–14)
MONOCYTES NFR BLD AUTO: 3.3 % — SIGNIFICANT CHANGE UP (ref 2–14)
MONOCYTES NFR BLD AUTO: 3.3 % — SIGNIFICANT CHANGE UP (ref 2–14)
MRSA PCR RESULT.: SIGNIFICANT CHANGE UP
MRSA PCR RESULT.: SIGNIFICANT CHANGE UP
MYELOCYTES NFR BLD: 1 % — HIGH (ref 0–0)
NEUTROPHILS # BLD AUTO: 39.26 K/UL — HIGH (ref 1.8–7.4)
NEUTROPHILS # BLD AUTO: 41.05 K/UL — HIGH (ref 1.8–7.4)
NEUTROPHILS # BLD AUTO: 41.05 K/UL — HIGH (ref 1.8–7.4)
NEUTROPHILS # BLD AUTO: 42.17 K/UL — HIGH (ref 1.8–7.4)
NEUTROPHILS # BLD AUTO: 42.17 K/UL — HIGH (ref 1.8–7.4)
NEUTROPHILS # BLD AUTO: 47.15 K/UL — HIGH (ref 1.8–7.4)
NEUTROPHILS # BLD AUTO: 49.79 K/UL — HIGH (ref 1.8–7.4)
NEUTROPHILS # BLD AUTO: 49.79 K/UL — HIGH (ref 1.8–7.4)
NEUTROPHILS NFR BLD AUTO: 81 % — HIGH (ref 43–77)
NEUTROPHILS NFR BLD AUTO: 81.7 % — HIGH (ref 43–77)
NEUTROPHILS NFR BLD AUTO: 81.7 % — HIGH (ref 43–77)
NEUTROPHILS NFR BLD AUTO: 83.7 % — HIGH (ref 43–77)
NEUTROPHILS NFR BLD AUTO: 84 % — HIGH (ref 43–77)
NEUTROPHILS NFR BLD AUTO: 84 % — HIGH (ref 43–77)
NEUTROPHILS NFR BLD AUTO: 88 % — HIGH (ref 43–77)
NEUTROPHILS NFR BLD AUTO: 88 % — HIGH (ref 43–77)
NEUTS BAND # BLD: 1 % — SIGNIFICANT CHANGE UP (ref 0–8)
NEUTS BAND # BLD: 1 % — SIGNIFICANT CHANGE UP (ref 0–8)
NEUTS BAND # BLD: 5 % — SIGNIFICANT CHANGE UP (ref 0–8)
NEUTS BAND # BLD: 6 % — SIGNIFICANT CHANGE UP (ref 0–8)
NEUTS BAND # BLD: 6 % — SIGNIFICANT CHANGE UP (ref 0–8)
NITRITE UR-MCNC: NEGATIVE — SIGNIFICANT CHANGE UP
NITRITE UR-MCNC: NEGATIVE — SIGNIFICANT CHANGE UP
NRBC # BLD: 0 /100 WBCS — SIGNIFICANT CHANGE UP (ref 0–0)
OVALOCYTES BLD QL SMEAR: SLIGHT — SIGNIFICANT CHANGE UP
PH UR: 5 — SIGNIFICANT CHANGE UP (ref 5–8)
PH UR: 5 — SIGNIFICANT CHANGE UP (ref 5–8)
PHOSPHATE SERPL-MCNC: 2.8 MG/DL — SIGNIFICANT CHANGE UP (ref 2.5–4.5)
PHOSPHATE SERPL-MCNC: 3 MG/DL — SIGNIFICANT CHANGE UP (ref 2.5–4.5)
PHOSPHATE SERPL-MCNC: 3 MG/DL — SIGNIFICANT CHANGE UP (ref 2.5–4.5)
PHOSPHATE SERPL-MCNC: 3.1 MG/DL — SIGNIFICANT CHANGE UP (ref 2.5–4.5)
PHOSPHATE SERPL-MCNC: 3.1 MG/DL — SIGNIFICANT CHANGE UP (ref 2.5–4.5)
PHOSPHATE SERPL-MCNC: 3.2 MG/DL — SIGNIFICANT CHANGE UP (ref 2.5–4.5)
PHOSPHATE SERPL-MCNC: 3.2 MG/DL — SIGNIFICANT CHANGE UP (ref 2.5–4.5)
PHOSPHATE SERPL-MCNC: 3.3 MG/DL — SIGNIFICANT CHANGE UP (ref 2.5–4.5)
PHOSPHATE SERPL-MCNC: 3.3 MG/DL — SIGNIFICANT CHANGE UP (ref 2.5–4.5)
PLAT MORPH BLD: NORMAL — SIGNIFICANT CHANGE UP
PLATELET # BLD AUTO: 323 K/UL — SIGNIFICANT CHANGE UP (ref 150–400)
PLATELET # BLD AUTO: 323 K/UL — SIGNIFICANT CHANGE UP (ref 150–400)
PLATELET # BLD AUTO: 344 K/UL — SIGNIFICANT CHANGE UP (ref 150–400)
PLATELET # BLD AUTO: 356 K/UL — SIGNIFICANT CHANGE UP (ref 150–400)
PLATELET # BLD AUTO: 357 K/UL — SIGNIFICANT CHANGE UP (ref 150–400)
PLATELET # BLD AUTO: 357 K/UL — SIGNIFICANT CHANGE UP (ref 150–400)
PLATELET # BLD AUTO: 359 K/UL — SIGNIFICANT CHANGE UP (ref 150–400)
PLATELET # BLD AUTO: 359 K/UL — SIGNIFICANT CHANGE UP (ref 150–400)
PLATELET # BLD AUTO: 363 K/UL — SIGNIFICANT CHANGE UP (ref 150–400)
PLATELET # BLD AUTO: 363 K/UL — SIGNIFICANT CHANGE UP (ref 150–400)
PLATELET # BLD AUTO: 385 K/UL — SIGNIFICANT CHANGE UP (ref 150–400)
PLATELET # BLD AUTO: 385 K/UL — SIGNIFICANT CHANGE UP (ref 150–400)
PLATELET # BLD AUTO: 387 K/UL — SIGNIFICANT CHANGE UP (ref 150–400)
PLATELET # BLD AUTO: 387 K/UL — SIGNIFICANT CHANGE UP (ref 150–400)
PLATELET # BLD AUTO: 398 K/UL — SIGNIFICANT CHANGE UP (ref 150–400)
PLATELET # BLD AUTO: 412 K/UL — HIGH (ref 150–400)
PLATELET # BLD AUTO: 412 K/UL — HIGH (ref 150–400)
PLATELET COUNT - ESTIMATE: NORMAL — SIGNIFICANT CHANGE UP
POIKILOCYTOSIS BLD QL AUTO: SLIGHT — SIGNIFICANT CHANGE UP
POLYCHROMASIA BLD QL SMEAR: SLIGHT — SIGNIFICANT CHANGE UP
POTASSIUM SERPL-MCNC: 3.2 MMOL/L — LOW (ref 3.5–5.3)
POTASSIUM SERPL-MCNC: 3.5 MMOL/L — SIGNIFICANT CHANGE UP (ref 3.5–5.3)
POTASSIUM SERPL-MCNC: 3.7 MMOL/L — SIGNIFICANT CHANGE UP (ref 3.5–5.3)
POTASSIUM SERPL-MCNC: 3.7 MMOL/L — SIGNIFICANT CHANGE UP (ref 3.5–5.3)
POTASSIUM SERPL-MCNC: 3.8 MMOL/L — SIGNIFICANT CHANGE UP (ref 3.5–5.3)
POTASSIUM SERPL-MCNC: 3.8 MMOL/L — SIGNIFICANT CHANGE UP (ref 3.5–5.3)
POTASSIUM SERPL-MCNC: 4 MMOL/L — SIGNIFICANT CHANGE UP (ref 3.5–5.3)
POTASSIUM SERPL-MCNC: 4.1 MMOL/L — SIGNIFICANT CHANGE UP (ref 3.5–5.3)
POTASSIUM SERPL-MCNC: 4.1 MMOL/L — SIGNIFICANT CHANGE UP (ref 3.5–5.3)
POTASSIUM SERPL-MCNC: 4.3 MMOL/L — SIGNIFICANT CHANGE UP (ref 3.5–5.3)
POTASSIUM SERPL-MCNC: 4.3 MMOL/L — SIGNIFICANT CHANGE UP (ref 3.5–5.3)
POTASSIUM SERPL-SCNC: 3.2 MMOL/L — LOW (ref 3.5–5.3)
POTASSIUM SERPL-SCNC: 3.5 MMOL/L — SIGNIFICANT CHANGE UP (ref 3.5–5.3)
POTASSIUM SERPL-SCNC: 3.7 MMOL/L — SIGNIFICANT CHANGE UP (ref 3.5–5.3)
POTASSIUM SERPL-SCNC: 3.7 MMOL/L — SIGNIFICANT CHANGE UP (ref 3.5–5.3)
POTASSIUM SERPL-SCNC: 3.8 MMOL/L — SIGNIFICANT CHANGE UP (ref 3.5–5.3)
POTASSIUM SERPL-SCNC: 3.8 MMOL/L — SIGNIFICANT CHANGE UP (ref 3.5–5.3)
POTASSIUM SERPL-SCNC: 4 MMOL/L — SIGNIFICANT CHANGE UP (ref 3.5–5.3)
POTASSIUM SERPL-SCNC: 4.1 MMOL/L — SIGNIFICANT CHANGE UP (ref 3.5–5.3)
POTASSIUM SERPL-SCNC: 4.1 MMOL/L — SIGNIFICANT CHANGE UP (ref 3.5–5.3)
POTASSIUM SERPL-SCNC: 4.3 MMOL/L — SIGNIFICANT CHANGE UP (ref 3.5–5.3)
POTASSIUM SERPL-SCNC: 4.3 MMOL/L — SIGNIFICANT CHANGE UP (ref 3.5–5.3)
PROCALCITONIN SERPL-MCNC: 0.44 NG/ML — HIGH (ref 0.02–0.1)
PROCALCITONIN SERPL-MCNC: 0.44 NG/ML — HIGH (ref 0.02–0.1)
PROT SERPL-MCNC: 5.3 G/DL — LOW (ref 6–8.3)
PROT SERPL-MCNC: 5.5 G/DL — LOW (ref 6–8.3)
PROT SERPL-MCNC: 5.5 G/DL — LOW (ref 6–8.3)
PROT SERPL-MCNC: 5.8 G/DL — LOW (ref 6–8.3)
PROT SERPL-MCNC: 5.8 G/DL — LOW (ref 6–8.3)
PROT SERPL-MCNC: 6 G/DL — SIGNIFICANT CHANGE UP (ref 6–8.3)
PROT SERPL-MCNC: 6.2 G/DL — SIGNIFICANT CHANGE UP (ref 6–8.3)
PROT SERPL-MCNC: 6.5 G/DL — SIGNIFICANT CHANGE UP (ref 6–8.3)
PROT SERPL-MCNC: 6.5 G/DL — SIGNIFICANT CHANGE UP (ref 6–8.3)
PROT UR-MCNC: ABNORMAL MG/DL
PROT UR-MCNC: ABNORMAL MG/DL
PROTHROM AB SERPL-ACNC: 16.8 SEC — HIGH (ref 9.5–13)
PROTHROM AB SERPL-ACNC: 16.8 SEC — HIGH (ref 9.5–13)
PSA FLD-MCNC: 8.88 NG/ML — HIGH (ref 0–4)
PSA FLD-MCNC: 8.88 NG/ML — HIGH (ref 0–4)
PSA FLD-MCNC: 9.3 NG/ML — HIGH (ref 0–4)
PSA FLD-MCNC: 9.3 NG/ML — HIGH (ref 0–4)
PTH-INTACT FLD-MCNC: 3 PG/ML — LOW (ref 15–65)
PTH-INTACT FLD-MCNC: 3 PG/ML — LOW (ref 15–65)
RBC # BLD: 2.85 M/UL — LOW (ref 4.2–5.8)
RBC # BLD: 2.88 M/UL — LOW (ref 4.2–5.8)
RBC # BLD: 2.88 M/UL — LOW (ref 4.2–5.8)
RBC # BLD: 2.91 M/UL — LOW (ref 4.2–5.8)
RBC # BLD: 2.91 M/UL — LOW (ref 4.2–5.8)
RBC # BLD: 2.93 M/UL — LOW (ref 4.2–5.8)
RBC # BLD: 2.93 M/UL — LOW (ref 4.2–5.8)
RBC # BLD: 2.98 M/UL — LOW (ref 4.2–5.8)
RBC # BLD: 2.98 M/UL — LOW (ref 4.2–5.8)
RBC # BLD: 3.02 M/UL — LOW (ref 4.2–5.8)
RBC # BLD: 3.02 M/UL — LOW (ref 4.2–5.8)
RBC # BLD: 3.09 M/UL — LOW (ref 4.2–5.8)
RBC # BLD: 3.16 M/UL — LOW (ref 4.2–5.8)
RBC # BLD: 3.29 M/UL — LOW (ref 4.2–5.8)
RBC # BLD: 3.35 M/UL — LOW (ref 4.2–5.8)
RBC # BLD: 3.35 M/UL — LOW (ref 4.2–5.8)
RBC # BLD: 3.73 M/UL — LOW (ref 4.2–5.8)
RBC # BLD: 3.73 M/UL — LOW (ref 4.2–5.8)
RBC # FLD: 16.7 % — HIGH (ref 10.3–14.5)
RBC # FLD: 16.7 % — HIGH (ref 10.3–14.5)
RBC # FLD: 16.8 % — HIGH (ref 10.3–14.5)
RBC # FLD: 16.9 % — HIGH (ref 10.3–14.5)
RBC # FLD: 16.9 % — HIGH (ref 10.3–14.5)
RBC # FLD: 17.1 % — HIGH (ref 10.3–14.5)
RBC # FLD: 17.5 % — HIGH (ref 10.3–14.5)
RBC # FLD: 18.2 % — HIGH (ref 10.3–14.5)
RBC # FLD: 18.3 % — HIGH (ref 10.3–14.5)
RBC # FLD: 18.6 % — HIGH (ref 10.3–14.5)
RBC # FLD: 18.6 % — HIGH (ref 10.3–14.5)
RBC BLD AUTO: ABNORMAL
RBC CASTS # UR COMP ASSIST: 0 /HPF — SIGNIFICANT CHANGE UP (ref 0–4)
RBC CASTS # UR COMP ASSIST: 0 /HPF — SIGNIFICANT CHANGE UP (ref 0–4)
RETICS #: 90.1 K/UL — SIGNIFICANT CHANGE UP (ref 25–125)
RETICS #: 90.1 K/UL — SIGNIFICANT CHANGE UP (ref 25–125)
RETICS/RBC NFR: 2.7 % — HIGH (ref 0.5–2.5)
RETICS/RBC NFR: 2.7 % — HIGH (ref 0.5–2.5)
S AUREUS DNA NOSE QL NAA+PROBE: DETECTED
S AUREUS DNA NOSE QL NAA+PROBE: DETECTED
SMUDGE CELLS # BLD: PRESENT — SIGNIFICANT CHANGE UP
SMUDGE CELLS # BLD: PRESENT — SIGNIFICANT CHANGE UP
SODIUM SERPL-SCNC: 137 MMOL/L — SIGNIFICANT CHANGE UP (ref 135–145)
SODIUM SERPL-SCNC: 137 MMOL/L — SIGNIFICANT CHANGE UP (ref 135–145)
SODIUM SERPL-SCNC: 138 MMOL/L — SIGNIFICANT CHANGE UP (ref 135–145)
SODIUM SERPL-SCNC: 139 MMOL/L — SIGNIFICANT CHANGE UP (ref 135–145)
SODIUM SERPL-SCNC: 139 MMOL/L — SIGNIFICANT CHANGE UP (ref 135–145)
SODIUM SERPL-SCNC: 141 MMOL/L — SIGNIFICANT CHANGE UP (ref 135–145)
SODIUM SERPL-SCNC: 144 MMOL/L — SIGNIFICANT CHANGE UP (ref 135–145)
SODIUM SERPL-SCNC: 144 MMOL/L — SIGNIFICANT CHANGE UP (ref 135–145)
SODIUM SERPL-SCNC: 148 MMOL/L — HIGH (ref 135–145)
SODIUM SERPL-SCNC: 148 MMOL/L — HIGH (ref 135–145)
SODIUM SERPL-SCNC: 151 MMOL/L — HIGH (ref 135–145)
SODIUM SERPL-SCNC: 151 MMOL/L — HIGH (ref 135–145)
SP GR SPEC: 1.02 — SIGNIFICANT CHANGE UP (ref 1–1.03)
SP GR SPEC: 1.02 — SIGNIFICANT CHANGE UP (ref 1–1.03)
STOMATOCYTES BLD QL SMEAR: SLIGHT — SIGNIFICANT CHANGE UP
TIBC SERPL-MCNC: 190 UG/DL — LOW (ref 250–450)
TIBC SERPL-MCNC: 190 UG/DL — LOW (ref 250–450)
TOXIC GRANULES BLD QL SMEAR: PRESENT — SIGNIFICANT CHANGE UP
TOXIC GRANULES BLD QL SMEAR: PRESENT — SIGNIFICANT CHANGE UP
TROPONIN I, HIGH SENSITIVITY RESULT: 6.8 NG/L — SIGNIFICANT CHANGE UP
TROPONIN I, HIGH SENSITIVITY RESULT: 6.8 NG/L — SIGNIFICANT CHANGE UP
UIBC SERPL-MCNC: 166 UG/DL — SIGNIFICANT CHANGE UP (ref 110–370)
UIBC SERPL-MCNC: 166 UG/DL — SIGNIFICANT CHANGE UP (ref 110–370)
UROBILINOGEN FLD QL: 1 MG/DL — SIGNIFICANT CHANGE UP (ref 0.2–1)
UROBILINOGEN FLD QL: 1 MG/DL — SIGNIFICANT CHANGE UP (ref 0.2–1)
VIT D25+D1,25 OH+D1,25 PNL SERPL-MCNC: 85.2 PG/ML — HIGH (ref 19.9–79.3)
VIT D25+D1,25 OH+D1,25 PNL SERPL-MCNC: 85.2 PG/ML — HIGH (ref 19.9–79.3)
WBC # BLD: 34.93 K/UL — HIGH (ref 3.8–10.5)
WBC # BLD: 34.93 K/UL — HIGH (ref 3.8–10.5)
WBC # BLD: 45.53 K/UL — CRITICAL HIGH (ref 3.8–10.5)
WBC # BLD: 45.53 K/UL — CRITICAL HIGH (ref 3.8–10.5)
WBC # BLD: 45.95 K/UL — CRITICAL HIGH (ref 3.8–10.5)
WBC # BLD: 45.95 K/UL — CRITICAL HIGH (ref 3.8–10.5)
WBC # BLD: 46.92 K/UL — CRITICAL HIGH (ref 3.8–10.5)
WBC # BLD: 47.92 K/UL — CRITICAL HIGH (ref 3.8–10.5)
WBC # BLD: 47.92 K/UL — CRITICAL HIGH (ref 3.8–10.5)
WBC # BLD: 48.41 K/UL — CRITICAL HIGH (ref 3.8–10.5)
WBC # BLD: 48.41 K/UL — CRITICAL HIGH (ref 3.8–10.5)
WBC # BLD: 50.24 K/UL — CRITICAL HIGH (ref 3.8–10.5)
WBC # BLD: 50.24 K/UL — CRITICAL HIGH (ref 3.8–10.5)
WBC # BLD: 54.36 K/UL — CRITICAL HIGH (ref 3.8–10.5)
WBC # BLD: 54.36 K/UL — CRITICAL HIGH (ref 3.8–10.5)
WBC # BLD: 54.83 K/UL — CRITICAL HIGH (ref 3.8–10.5)
WBC # BLD: 55.32 K/UL — CRITICAL HIGH (ref 3.8–10.5)
WBC # BLD: 55.32 K/UL — CRITICAL HIGH (ref 3.8–10.5)
WBC # BLD: 55.64 K/UL — CRITICAL HIGH (ref 3.8–10.5)
WBC # BLD: 55.64 K/UL — CRITICAL HIGH (ref 3.8–10.5)
WBC # BLD: 57.13 K/UL — CRITICAL HIGH (ref 3.8–10.5)
WBC # BLD: 57.13 K/UL — CRITICAL HIGH (ref 3.8–10.5)
WBC # FLD AUTO: 34.93 K/UL — HIGH (ref 3.8–10.5)
WBC # FLD AUTO: 34.93 K/UL — HIGH (ref 3.8–10.5)
WBC # FLD AUTO: 45.53 K/UL — CRITICAL HIGH (ref 3.8–10.5)
WBC # FLD AUTO: 45.53 K/UL — CRITICAL HIGH (ref 3.8–10.5)
WBC # FLD AUTO: 45.95 K/UL — CRITICAL HIGH (ref 3.8–10.5)
WBC # FLD AUTO: 45.95 K/UL — CRITICAL HIGH (ref 3.8–10.5)
WBC # FLD AUTO: 46.92 K/UL — CRITICAL HIGH (ref 3.8–10.5)
WBC # FLD AUTO: 47.92 K/UL — CRITICAL HIGH (ref 3.8–10.5)
WBC # FLD AUTO: 47.92 K/UL — CRITICAL HIGH (ref 3.8–10.5)
WBC # FLD AUTO: 48.41 K/UL — CRITICAL HIGH (ref 3.8–10.5)
WBC # FLD AUTO: 48.41 K/UL — CRITICAL HIGH (ref 3.8–10.5)
WBC # FLD AUTO: 50.24 K/UL — CRITICAL HIGH (ref 3.8–10.5)
WBC # FLD AUTO: 50.24 K/UL — CRITICAL HIGH (ref 3.8–10.5)
WBC # FLD AUTO: 54.36 K/UL — CRITICAL HIGH (ref 3.8–10.5)
WBC # FLD AUTO: 54.36 K/UL — CRITICAL HIGH (ref 3.8–10.5)
WBC # FLD AUTO: 54.83 K/UL — CRITICAL HIGH (ref 3.8–10.5)
WBC # FLD AUTO: 55.32 K/UL — CRITICAL HIGH (ref 3.8–10.5)
WBC # FLD AUTO: 55.32 K/UL — CRITICAL HIGH (ref 3.8–10.5)
WBC # FLD AUTO: 55.64 K/UL — CRITICAL HIGH (ref 3.8–10.5)
WBC # FLD AUTO: 55.64 K/UL — CRITICAL HIGH (ref 3.8–10.5)
WBC # FLD AUTO: 57.13 K/UL — CRITICAL HIGH (ref 3.8–10.5)
WBC # FLD AUTO: 57.13 K/UL — CRITICAL HIGH (ref 3.8–10.5)
WBC UR QL: 0 /HPF — SIGNIFICANT CHANGE UP (ref 0–5)
WBC UR QL: 0 /HPF — SIGNIFICANT CHANGE UP (ref 0–5)

## 2024-01-01 PROCEDURE — 70450 CT HEAD/BRAIN W/O DYE: CPT | Mod: MA

## 2024-01-01 PROCEDURE — 83615 LACTATE (LD) (LDH) ENZYME: CPT

## 2024-01-01 PROCEDURE — 99232 SBSQ HOSP IP/OBS MODERATE 35: CPT

## 2024-01-01 PROCEDURE — 97110 THERAPEUTIC EXERCISES: CPT

## 2024-01-01 PROCEDURE — 93306 TTE W/DOPPLER COMPLETE: CPT

## 2024-01-01 PROCEDURE — 82378 CARCINOEMBRYONIC ANTIGEN: CPT

## 2024-01-01 PROCEDURE — 82330 ASSAY OF CALCIUM: CPT

## 2024-01-01 PROCEDURE — 85610 PROTHROMBIN TIME: CPT

## 2024-01-01 PROCEDURE — 36415 COLL VENOUS BLD VENIPUNCTURE: CPT

## 2024-01-01 PROCEDURE — 99239 HOSP IP/OBS DSCHRG MGMT >30: CPT

## 2024-01-01 PROCEDURE — 80048 BASIC METABOLIC PNL TOTAL CA: CPT

## 2024-01-01 PROCEDURE — 99233 SBSQ HOSP IP/OBS HIGH 50: CPT

## 2024-01-01 PROCEDURE — 83550 IRON BINDING TEST: CPT

## 2024-01-01 PROCEDURE — 85027 COMPLETE CBC AUTOMATED: CPT

## 2024-01-01 PROCEDURE — 99231 SBSQ HOSP IP/OBS SF/LOW 25: CPT

## 2024-01-01 PROCEDURE — 83540 ASSAY OF IRON: CPT

## 2024-01-01 PROCEDURE — 85730 THROMBOPLASTIN TIME PARTIAL: CPT

## 2024-01-01 PROCEDURE — 97163 PT EVAL HIGH COMPLEX 45 MIN: CPT

## 2024-01-01 PROCEDURE — 71045 X-RAY EXAM CHEST 1 VIEW: CPT | Mod: 26

## 2024-01-01 PROCEDURE — 99223 1ST HOSP IP/OBS HIGH 75: CPT

## 2024-01-01 PROCEDURE — 80053 COMPREHEN METABOLIC PANEL: CPT

## 2024-01-01 PROCEDURE — 87641 MR-STAPH DNA AMP PROBE: CPT

## 2024-01-01 PROCEDURE — 85385 FIBRINOGEN ANTIGEN: CPT

## 2024-01-01 PROCEDURE — 99223 1ST HOSP IP/OBS HIGH 75: CPT | Mod: GC

## 2024-01-01 PROCEDURE — 99497 ADVNCD CARE PLAN 30 MIN: CPT | Mod: 25

## 2024-01-01 PROCEDURE — 99233 SBSQ HOSP IP/OBS HIGH 50: CPT | Mod: 25

## 2024-01-01 PROCEDURE — 85025 COMPLETE CBC W/AUTO DIFF WBC: CPT

## 2024-01-01 PROCEDURE — 71045 X-RAY EXAM CHEST 1 VIEW: CPT

## 2024-01-01 PROCEDURE — 83970 ASSAY OF PARATHORMONE: CPT

## 2024-01-01 PROCEDURE — 72170 X-RAY EXAM OF PELVIS: CPT | Mod: 26

## 2024-01-01 PROCEDURE — 83010 ASSAY OF HAPTOGLOBIN QUANT: CPT

## 2024-01-01 PROCEDURE — 72170 X-RAY EXAM OF PELVIS: CPT

## 2024-01-01 PROCEDURE — 74177 CT ABD & PELVIS W/CONTRAST: CPT

## 2024-01-01 PROCEDURE — 84145 PROCALCITONIN (PCT): CPT

## 2024-01-01 PROCEDURE — 74177 CT ABD & PELVIS W/CONTRAST: CPT | Mod: 26

## 2024-01-01 PROCEDURE — 70450 CT HEAD/BRAIN W/O DYE: CPT | Mod: 26,MA

## 2024-01-01 PROCEDURE — 82962 GLUCOSE BLOOD TEST: CPT

## 2024-01-01 PROCEDURE — G0103: CPT

## 2024-01-01 PROCEDURE — 84484 ASSAY OF TROPONIN QUANT: CPT

## 2024-01-01 PROCEDURE — 82652 VIT D 1 25-DIHYDROXY: CPT

## 2024-01-01 PROCEDURE — 82310 ASSAY OF CALCIUM: CPT

## 2024-01-01 PROCEDURE — 99285 EMERGENCY DEPT VISIT HI MDM: CPT

## 2024-01-01 PROCEDURE — 80076 HEPATIC FUNCTION PANEL: CPT

## 2024-01-01 PROCEDURE — 73562 X-RAY EXAM OF KNEE 3: CPT

## 2024-01-01 PROCEDURE — 82728 ASSAY OF FERRITIN: CPT

## 2024-01-01 PROCEDURE — 87640 STAPH A DNA AMP PROBE: CPT

## 2024-01-01 PROCEDURE — 71260 CT THORAX DX C+: CPT

## 2024-01-01 PROCEDURE — 81001 URINALYSIS AUTO W/SCOPE: CPT

## 2024-01-01 PROCEDURE — 85045 AUTOMATED RETICULOCYTE COUNT: CPT

## 2024-01-01 PROCEDURE — 83735 ASSAY OF MAGNESIUM: CPT

## 2024-01-01 PROCEDURE — 93010 ELECTROCARDIOGRAM REPORT: CPT

## 2024-01-01 PROCEDURE — 93005 ELECTROCARDIOGRAM TRACING: CPT

## 2024-01-01 PROCEDURE — 73562 X-RAY EXAM OF KNEE 3: CPT | Mod: 26,LT

## 2024-01-01 PROCEDURE — 86140 C-REACTIVE PROTEIN: CPT

## 2024-01-01 PROCEDURE — 97530 THERAPEUTIC ACTIVITIES: CPT

## 2024-01-01 PROCEDURE — 85652 RBC SED RATE AUTOMATED: CPT

## 2024-01-01 PROCEDURE — 96374 THER/PROPH/DIAG INJ IV PUSH: CPT

## 2024-01-01 PROCEDURE — 71260 CT THORAX DX C+: CPT | Mod: 26

## 2024-01-01 PROCEDURE — 84100 ASSAY OF PHOSPHORUS: CPT

## 2024-01-01 RX ORDER — ENOXAPARIN SODIUM 100 MG/ML
40 INJECTION SUBCUTANEOUS EVERY 24 HOURS
Refills: 0 | Status: DISCONTINUED | OUTPATIENT
Start: 2024-01-01 | End: 2024-01-01

## 2024-01-01 RX ORDER — ACETAMINOPHEN 500 MG
650 TABLET ORAL EVERY 6 HOURS
Refills: 0 | Status: DISCONTINUED | OUTPATIENT
Start: 2024-01-01 | End: 2024-01-01

## 2024-01-01 RX ORDER — MUPIROCIN 20 MG/G
1 OINTMENT TOPICAL
Refills: 0 | Status: COMPLETED | OUTPATIENT
Start: 2024-01-01 | End: 2024-01-01

## 2024-01-01 RX ORDER — SODIUM CHLORIDE 9 MG/ML
1000 INJECTION INTRAMUSCULAR; INTRAVENOUS; SUBCUTANEOUS
Refills: 0 | Status: DISCONTINUED | OUTPATIENT
Start: 2024-01-01 | End: 2024-01-01

## 2024-01-01 RX ORDER — ASPIRIN/CALCIUM CARB/MAGNESIUM 324 MG
1 TABLET ORAL
Refills: 0 | DISCHARGE

## 2024-01-01 RX ORDER — MORPHINE SULFATE 50 MG/1
0.5 CAPSULE, EXTENDED RELEASE ORAL EVERY 4 HOURS
Refills: 0 | Status: DISCONTINUED | OUTPATIENT
Start: 2024-01-01 | End: 2024-01-01

## 2024-01-01 RX ORDER — FENTANYL CITRATE 50 UG/ML
1 INJECTION INTRAVENOUS
Refills: 0 | Status: DISCONTINUED | OUTPATIENT
Start: 2024-01-01 | End: 2024-01-01

## 2024-01-01 RX ORDER — SODIUM CHLORIDE 9 MG/ML
1000 INJECTION, SOLUTION INTRAVENOUS
Refills: 0 | Status: DISCONTINUED | OUTPATIENT
Start: 2024-01-01 | End: 2024-01-01

## 2024-01-01 RX ORDER — METOCLOPRAMIDE HCL 10 MG
10 TABLET ORAL ONCE
Refills: 0 | Status: COMPLETED | OUTPATIENT
Start: 2024-01-01 | End: 2024-01-01

## 2024-01-01 RX ORDER — LANOLIN ALCOHOL/MO/W.PET/CERES
3 CREAM (GRAM) TOPICAL AT BEDTIME
Refills: 0 | Status: DISCONTINUED | OUTPATIENT
Start: 2024-01-01 | End: 2024-01-01

## 2024-01-01 RX ORDER — SODIUM CHLORIDE 9 MG/ML
1000 INJECTION, SOLUTION INTRAVENOUS
Refills: 0 | Status: COMPLETED | OUTPATIENT
Start: 2024-01-01 | End: 2024-01-01

## 2024-01-01 RX ORDER — LABETALOL HCL 100 MG
10 TABLET ORAL ONCE
Refills: 0 | Status: DISCONTINUED | OUTPATIENT
Start: 2024-01-01 | End: 2024-01-01

## 2024-01-01 RX ORDER — POLYETHYLENE GLYCOL 3350 17 G/17G
17 POWDER, FOR SOLUTION ORAL DAILY
Refills: 0 | Status: DISCONTINUED | OUTPATIENT
Start: 2024-01-01 | End: 2024-01-01

## 2024-01-01 RX ORDER — ASPIRIN/CALCIUM CARB/MAGNESIUM 324 MG
81 TABLET ORAL DAILY
Refills: 0 | Status: DISCONTINUED | OUTPATIENT
Start: 2024-01-01 | End: 2024-01-01

## 2024-01-01 RX ORDER — TAMSULOSIN HYDROCHLORIDE 0.4 MG/1
1 CAPSULE ORAL
Refills: 0 | DISCHARGE

## 2024-01-01 RX ORDER — TAMSULOSIN HYDROCHLORIDE 0.4 MG/1
0.4 CAPSULE ORAL AT BEDTIME
Refills: 0 | Status: DISCONTINUED | OUTPATIENT
Start: 2024-01-01 | End: 2024-01-01

## 2024-01-01 RX ORDER — CHLORHEXIDINE GLUCONATE 213 G/1000ML
1 SOLUTION TOPICAL
Refills: 0 | Status: DISCONTINUED | OUTPATIENT
Start: 2024-01-01 | End: 2024-01-01

## 2024-01-01 RX ORDER — DONEPEZIL HYDROCHLORIDE 10 MG/1
1 TABLET, FILM COATED ORAL
Refills: 0 | DISCHARGE

## 2024-01-01 RX ORDER — DONEPEZIL HYDROCHLORIDE 10 MG/1
5 TABLET, FILM COATED ORAL AT BEDTIME
Refills: 0 | Status: DISCONTINUED | OUTPATIENT
Start: 2024-01-01 | End: 2024-01-01

## 2024-01-01 RX ORDER — OLANZAPINE 15 MG/1
2.5 TABLET, FILM COATED ORAL ONCE
Refills: 0 | Status: COMPLETED | OUTPATIENT
Start: 2024-01-01 | End: 2024-01-01

## 2024-01-01 RX ORDER — SODIUM CHLORIDE 9 MG/ML
1000 INJECTION INTRAMUSCULAR; INTRAVENOUS; SUBCUTANEOUS ONCE
Refills: 0 | Status: COMPLETED | OUTPATIENT
Start: 2024-01-01 | End: 2024-01-01

## 2024-01-01 RX ORDER — MECLIZINE HCL 12.5 MG
25 TABLET ORAL ONCE
Refills: 0 | Status: COMPLETED | OUTPATIENT
Start: 2024-01-01 | End: 2024-01-01

## 2024-01-01 RX ORDER — POTASSIUM CHLORIDE 20 MEQ
10 PACKET (EA) ORAL
Refills: 0 | Status: COMPLETED | OUTPATIENT
Start: 2024-01-01 | End: 2024-01-01

## 2024-01-01 RX ORDER — SENNA PLUS 8.6 MG/1
2 TABLET ORAL AT BEDTIME
Refills: 0 | Status: DISCONTINUED | OUTPATIENT
Start: 2024-01-01 | End: 2024-01-01

## 2024-01-01 RX ADMIN — Medication 1 MILLIGRAM(S): at 14:02

## 2024-01-01 RX ADMIN — MUPIROCIN 1 APPLICATION(S): 20 OINTMENT TOPICAL at 05:25

## 2024-01-01 RX ADMIN — TAMSULOSIN HYDROCHLORIDE 0.4 MILLIGRAM(S): 0.4 CAPSULE ORAL at 21:50

## 2024-01-01 RX ADMIN — Medication 81 MILLIGRAM(S): at 12:31

## 2024-01-01 RX ADMIN — Medication 3 MILLIGRAM(S): at 22:55

## 2024-01-01 RX ADMIN — TAMSULOSIN HYDROCHLORIDE 0.4 MILLIGRAM(S): 0.4 CAPSULE ORAL at 22:11

## 2024-01-01 RX ADMIN — CHLORHEXIDINE GLUCONATE 1 APPLICATION(S): 213 SOLUTION TOPICAL at 06:09

## 2024-01-01 RX ADMIN — ENOXAPARIN SODIUM 40 MILLIGRAM(S): 100 INJECTION SUBCUTANEOUS at 17:33

## 2024-01-01 RX ADMIN — FENTANYL CITRATE 1 PATCH: 50 INJECTION INTRAVENOUS at 07:03

## 2024-01-01 RX ADMIN — Medication 81 MILLIGRAM(S): at 11:52

## 2024-01-01 RX ADMIN — MORPHINE SULFATE 0.5 MILLIGRAM(S): 50 CAPSULE, EXTENDED RELEASE ORAL at 20:01

## 2024-01-01 RX ADMIN — DONEPEZIL HYDROCHLORIDE 5 MILLIGRAM(S): 10 TABLET, FILM COATED ORAL at 22:09

## 2024-01-01 RX ADMIN — FENTANYL CITRATE 1 PATCH: 50 INJECTION INTRAVENOUS at 18:48

## 2024-01-01 RX ADMIN — SODIUM CHLORIDE 75 MILLILITER(S): 9 INJECTION, SOLUTION INTRAVENOUS at 16:34

## 2024-01-01 RX ADMIN — Medication 1 MILLIGRAM(S): at 03:20

## 2024-01-01 RX ADMIN — Medication 81 MILLIGRAM(S): at 12:26

## 2024-01-01 RX ADMIN — CHLORHEXIDINE GLUCONATE 1 APPLICATION(S): 213 SOLUTION TOPICAL at 05:43

## 2024-01-01 RX ADMIN — CHLORHEXIDINE GLUCONATE 1 APPLICATION(S): 213 SOLUTION TOPICAL at 05:26

## 2024-01-01 RX ADMIN — CHLORHEXIDINE GLUCONATE 1 APPLICATION(S): 213 SOLUTION TOPICAL at 05:02

## 2024-01-01 RX ADMIN — DONEPEZIL HYDROCHLORIDE 5 MILLIGRAM(S): 10 TABLET, FILM COATED ORAL at 22:08

## 2024-01-01 RX ADMIN — CHLORHEXIDINE GLUCONATE 1 APPLICATION(S): 213 SOLUTION TOPICAL at 05:49

## 2024-01-01 RX ADMIN — MUPIROCIN 1 APPLICATION(S): 20 OINTMENT TOPICAL at 05:49

## 2024-01-01 RX ADMIN — DONEPEZIL HYDROCHLORIDE 5 MILLIGRAM(S): 10 TABLET, FILM COATED ORAL at 20:24

## 2024-01-01 RX ADMIN — FENTANYL CITRATE 1 PATCH: 50 INJECTION INTRAVENOUS at 19:30

## 2024-01-01 RX ADMIN — FENTANYL CITRATE 1 PATCH: 50 INJECTION INTRAVENOUS at 17:33

## 2024-01-01 RX ADMIN — CHLORHEXIDINE GLUCONATE 1 APPLICATION(S): 213 SOLUTION TOPICAL at 05:36

## 2024-01-01 RX ADMIN — SODIUM CHLORIDE 75 MILLILITER(S): 9 INJECTION, SOLUTION INTRAVENOUS at 03:35

## 2024-01-01 RX ADMIN — Medication 81 MILLIGRAM(S): at 12:49

## 2024-01-01 RX ADMIN — CHLORHEXIDINE GLUCONATE 1 APPLICATION(S): 213 SOLUTION TOPICAL at 05:34

## 2024-01-01 RX ADMIN — ENOXAPARIN SODIUM 40 MILLIGRAM(S): 100 INJECTION SUBCUTANEOUS at 17:36

## 2024-01-01 RX ADMIN — MORPHINE SULFATE 0.5 MILLIGRAM(S): 50 CAPSULE, EXTENDED RELEASE ORAL at 23:30

## 2024-01-01 RX ADMIN — SODIUM CHLORIDE 75 MILLILITER(S): 9 INJECTION, SOLUTION INTRAVENOUS at 14:31

## 2024-01-01 RX ADMIN — FENTANYL CITRATE 1 PATCH: 50 INJECTION INTRAVENOUS at 19:33

## 2024-01-01 RX ADMIN — Medication 3 MILLIGRAM(S): at 22:46

## 2024-01-01 RX ADMIN — Medication 650 MILLIGRAM(S): at 20:53

## 2024-01-01 RX ADMIN — FENTANYL CITRATE 1 PATCH: 50 INJECTION INTRAVENOUS at 07:30

## 2024-01-01 RX ADMIN — MUPIROCIN 1 APPLICATION(S): 20 OINTMENT TOPICAL at 17:37

## 2024-01-01 RX ADMIN — ENOXAPARIN SODIUM 40 MILLIGRAM(S): 100 INJECTION SUBCUTANEOUS at 17:28

## 2024-01-01 RX ADMIN — OLANZAPINE 2.5 MILLIGRAM(S): 15 TABLET, FILM COATED ORAL at 13:25

## 2024-01-01 RX ADMIN — Medication 81 MILLIGRAM(S): at 12:06

## 2024-01-01 RX ADMIN — SODIUM CHLORIDE 100 MILLILITER(S): 9 INJECTION INTRAMUSCULAR; INTRAVENOUS; SUBCUTANEOUS at 10:52

## 2024-01-01 RX ADMIN — MORPHINE SULFATE 0.5 MILLIGRAM(S): 50 CAPSULE, EXTENDED RELEASE ORAL at 18:31

## 2024-01-01 RX ADMIN — MORPHINE SULFATE 0.5 MILLIGRAM(S): 50 CAPSULE, EXTENDED RELEASE ORAL at 20:50

## 2024-01-01 RX ADMIN — DONEPEZIL HYDROCHLORIDE 5 MILLIGRAM(S): 10 TABLET, FILM COATED ORAL at 21:55

## 2024-01-01 RX ADMIN — Medication 3 MILLIGRAM(S): at 22:15

## 2024-01-01 RX ADMIN — MUPIROCIN 1 APPLICATION(S): 20 OINTMENT TOPICAL at 18:42

## 2024-01-01 RX ADMIN — Medication 100 MILLIEQUIVALENT(S): at 09:58

## 2024-01-01 RX ADMIN — CHLORHEXIDINE GLUCONATE 1 APPLICATION(S): 213 SOLUTION TOPICAL at 05:57

## 2024-01-01 RX ADMIN — TAMSULOSIN HYDROCHLORIDE 0.4 MILLIGRAM(S): 0.4 CAPSULE ORAL at 22:12

## 2024-01-01 RX ADMIN — Medication 3 MILLIGRAM(S): at 22:42

## 2024-01-01 RX ADMIN — CHLORHEXIDINE GLUCONATE 1 APPLICATION(S): 213 SOLUTION TOPICAL at 06:12

## 2024-01-01 RX ADMIN — TAMSULOSIN HYDROCHLORIDE 0.4 MILLIGRAM(S): 0.4 CAPSULE ORAL at 21:55

## 2024-01-01 RX ADMIN — FENTANYL CITRATE 1 PATCH: 50 INJECTION INTRAVENOUS at 07:42

## 2024-01-01 RX ADMIN — SODIUM CHLORIDE 75 MILLILITER(S): 9 INJECTION, SOLUTION INTRAVENOUS at 06:05

## 2024-01-01 RX ADMIN — Medication 650 MILLIGRAM(S): at 22:54

## 2024-01-01 RX ADMIN — MUPIROCIN 1 APPLICATION(S): 20 OINTMENT TOPICAL at 05:35

## 2024-01-01 RX ADMIN — FENTANYL CITRATE 1 PATCH: 50 INJECTION INTRAVENOUS at 17:50

## 2024-01-01 RX ADMIN — TAMSULOSIN HYDROCHLORIDE 0.4 MILLIGRAM(S): 0.4 CAPSULE ORAL at 22:15

## 2024-01-01 RX ADMIN — DONEPEZIL HYDROCHLORIDE 5 MILLIGRAM(S): 10 TABLET, FILM COATED ORAL at 22:12

## 2024-01-01 RX ADMIN — SODIUM CHLORIDE 75 MILLILITER(S): 9 INJECTION, SOLUTION INTRAVENOUS at 05:36

## 2024-01-01 RX ADMIN — MORPHINE SULFATE 0.5 MILLIGRAM(S): 50 CAPSULE, EXTENDED RELEASE ORAL at 22:38

## 2024-01-01 RX ADMIN — MORPHINE SULFATE 0.5 MILLIGRAM(S): 50 CAPSULE, EXTENDED RELEASE ORAL at 20:30

## 2024-01-01 RX ADMIN — MORPHINE SULFATE 0.5 MILLIGRAM(S): 50 CAPSULE, EXTENDED RELEASE ORAL at 19:09

## 2024-01-01 RX ADMIN — DONEPEZIL HYDROCHLORIDE 5 MILLIGRAM(S): 10 TABLET, FILM COATED ORAL at 21:56

## 2024-01-01 RX ADMIN — OLANZAPINE 2.5 MILLIGRAM(S): 15 TABLET, FILM COATED ORAL at 20:05

## 2024-01-01 RX ADMIN — MORPHINE SULFATE 0.5 MILLIGRAM(S): 50 CAPSULE, EXTENDED RELEASE ORAL at 12:06

## 2024-01-01 RX ADMIN — ENOXAPARIN SODIUM 40 MILLIGRAM(S): 100 INJECTION SUBCUTANEOUS at 17:38

## 2024-01-01 RX ADMIN — Medication 81 MILLIGRAM(S): at 13:44

## 2024-01-01 RX ADMIN — Medication 104 MILLIGRAM(S): at 14:58

## 2024-01-01 RX ADMIN — FENTANYL CITRATE 1 PATCH: 50 INJECTION INTRAVENOUS at 07:00

## 2024-01-01 RX ADMIN — TAMSULOSIN HYDROCHLORIDE 0.4 MILLIGRAM(S): 0.4 CAPSULE ORAL at 20:24

## 2024-01-01 RX ADMIN — MORPHINE SULFATE 0.5 MILLIGRAM(S): 50 CAPSULE, EXTENDED RELEASE ORAL at 20:25

## 2024-01-01 RX ADMIN — MUPIROCIN 1 APPLICATION(S): 20 OINTMENT TOPICAL at 17:46

## 2024-01-01 RX ADMIN — FENTANYL CITRATE 1 PATCH: 50 INJECTION INTRAVENOUS at 17:12

## 2024-01-01 RX ADMIN — ENOXAPARIN SODIUM 40 MILLIGRAM(S): 100 INJECTION SUBCUTANEOUS at 16:40

## 2024-01-01 RX ADMIN — FENTANYL CITRATE 1 PATCH: 50 INJECTION INTRAVENOUS at 07:44

## 2024-01-01 RX ADMIN — Medication 3 MILLIGRAM(S): at 21:50

## 2024-01-01 RX ADMIN — SODIUM CHLORIDE 1000 MILLILITER(S): 9 INJECTION INTRAMUSCULAR; INTRAVENOUS; SUBCUTANEOUS at 14:58

## 2024-01-01 RX ADMIN — TAMSULOSIN HYDROCHLORIDE 0.4 MILLIGRAM(S): 0.4 CAPSULE ORAL at 21:56

## 2024-01-01 RX ADMIN — Medication 81 MILLIGRAM(S): at 12:39

## 2024-01-01 RX ADMIN — Medication 3 MILLIGRAM(S): at 22:12

## 2024-01-01 RX ADMIN — ENOXAPARIN SODIUM 40 MILLIGRAM(S): 100 INJECTION SUBCUTANEOUS at 16:30

## 2024-01-01 RX ADMIN — TAMSULOSIN HYDROCHLORIDE 0.4 MILLIGRAM(S): 0.4 CAPSULE ORAL at 22:46

## 2024-01-01 RX ADMIN — FENTANYL CITRATE 1 PATCH: 50 INJECTION INTRAVENOUS at 17:16

## 2024-01-01 RX ADMIN — DONEPEZIL HYDROCHLORIDE 5 MILLIGRAM(S): 10 TABLET, FILM COATED ORAL at 22:46

## 2024-01-01 RX ADMIN — DONEPEZIL HYDROCHLORIDE 5 MILLIGRAM(S): 10 TABLET, FILM COATED ORAL at 22:38

## 2024-01-01 RX ADMIN — CHLORHEXIDINE GLUCONATE 1 APPLICATION(S): 213 SOLUTION TOPICAL at 05:33

## 2024-01-01 RX ADMIN — FENTANYL CITRATE 1 PATCH: 50 INJECTION INTRAVENOUS at 08:08

## 2024-01-01 RX ADMIN — CHLORHEXIDINE GLUCONATE 1 APPLICATION(S): 213 SOLUTION TOPICAL at 05:30

## 2024-01-01 RX ADMIN — MORPHINE SULFATE 0.5 MILLIGRAM(S): 50 CAPSULE, EXTENDED RELEASE ORAL at 20:20

## 2024-01-01 RX ADMIN — MORPHINE SULFATE 0.5 MILLIGRAM(S): 50 CAPSULE, EXTENDED RELEASE ORAL at 18:07

## 2024-01-01 RX ADMIN — TAMSULOSIN HYDROCHLORIDE 0.4 MILLIGRAM(S): 0.4 CAPSULE ORAL at 22:47

## 2024-01-01 RX ADMIN — SODIUM CHLORIDE 80 MILLILITER(S): 9 INJECTION, SOLUTION INTRAVENOUS at 14:20

## 2024-01-01 RX ADMIN — CHLORHEXIDINE GLUCONATE 1 APPLICATION(S): 213 SOLUTION TOPICAL at 05:13

## 2024-01-01 RX ADMIN — ENOXAPARIN SODIUM 40 MILLIGRAM(S): 100 INJECTION SUBCUTANEOUS at 16:37

## 2024-01-01 RX ADMIN — Medication 81 MILLIGRAM(S): at 13:27

## 2024-01-01 RX ADMIN — Medication 3 MILLIGRAM(S): at 22:44

## 2024-01-01 RX ADMIN — TAMSULOSIN HYDROCHLORIDE 0.4 MILLIGRAM(S): 0.4 CAPSULE ORAL at 22:38

## 2024-01-01 RX ADMIN — FENTANYL CITRATE 1 PATCH: 50 INJECTION INTRAVENOUS at 22:17

## 2024-01-01 RX ADMIN — CHLORHEXIDINE GLUCONATE 1 APPLICATION(S): 213 SOLUTION TOPICAL at 06:05

## 2024-01-01 RX ADMIN — DONEPEZIL HYDROCHLORIDE 5 MILLIGRAM(S): 10 TABLET, FILM COATED ORAL at 22:11

## 2024-01-01 RX ADMIN — DONEPEZIL HYDROCHLORIDE 5 MILLIGRAM(S): 10 TABLET, FILM COATED ORAL at 21:50

## 2024-01-01 RX ADMIN — FENTANYL CITRATE 1 PATCH: 50 INJECTION INTRAVENOUS at 18:35

## 2024-01-01 RX ADMIN — Medication 100 MILLIEQUIVALENT(S): at 08:47

## 2024-01-01 RX ADMIN — CHLORHEXIDINE GLUCONATE 1 APPLICATION(S): 213 SOLUTION TOPICAL at 06:20

## 2024-01-01 RX ADMIN — MUPIROCIN 1 APPLICATION(S): 20 OINTMENT TOPICAL at 17:33

## 2024-01-01 RX ADMIN — TAMSULOSIN HYDROCHLORIDE 0.4 MILLIGRAM(S): 0.4 CAPSULE ORAL at 22:07

## 2024-01-01 RX ADMIN — SODIUM CHLORIDE 75 MILLILITER(S): 9 INJECTION, SOLUTION INTRAVENOUS at 17:36

## 2024-01-01 RX ADMIN — ENOXAPARIN SODIUM 40 MILLIGRAM(S): 100 INJECTION SUBCUTANEOUS at 17:07

## 2024-01-01 RX ADMIN — SODIUM CHLORIDE 100 MILLILITER(S): 9 INJECTION INTRAMUSCULAR; INTRAVENOUS; SUBCUTANEOUS at 21:15

## 2024-01-01 RX ADMIN — MUPIROCIN 1 APPLICATION(S): 20 OINTMENT TOPICAL at 05:37

## 2024-01-01 RX ADMIN — Medication 3 MILLIGRAM(S): at 22:17

## 2024-01-01 RX ADMIN — MORPHINE SULFATE 0.5 MILLIGRAM(S): 50 CAPSULE, EXTENDED RELEASE ORAL at 12:41

## 2024-01-01 RX ADMIN — SODIUM CHLORIDE 75 MILLILITER(S): 9 INJECTION, SOLUTION INTRAVENOUS at 11:57

## 2024-01-01 RX ADMIN — MUPIROCIN 1 APPLICATION(S): 20 OINTMENT TOPICAL at 05:12

## 2024-01-01 RX ADMIN — DONEPEZIL HYDROCHLORIDE 5 MILLIGRAM(S): 10 TABLET, FILM COATED ORAL at 22:15

## 2024-01-01 RX ADMIN — DONEPEZIL HYDROCHLORIDE 5 MILLIGRAM(S): 10 TABLET, FILM COATED ORAL at 22:47

## 2024-01-01 RX ADMIN — Medication 25 MILLIGRAM(S): at 14:58

## 2024-01-01 RX ADMIN — TAMSULOSIN HYDROCHLORIDE 0.4 MILLIGRAM(S): 0.4 CAPSULE ORAL at 22:13

## 2024-01-01 RX ADMIN — Medication 3 MILLIGRAM(S): at 20:25

## 2024-01-01 RX ADMIN — SODIUM CHLORIDE 75 MILLILITER(S): 9 INJECTION, SOLUTION INTRAVENOUS at 05:34

## 2024-01-01 RX ADMIN — Medication 3 MILLIGRAM(S): at 22:03

## 2024-01-01 RX ADMIN — MORPHINE SULFATE 0.5 MILLIGRAM(S): 50 CAPSULE, EXTENDED RELEASE ORAL at 19:53

## 2024-01-01 RX ADMIN — Medication 81 MILLIGRAM(S): at 12:14

## 2024-01-01 RX ADMIN — FENTANYL CITRATE 1 PATCH: 50 INJECTION INTRAVENOUS at 20:42

## 2024-01-01 RX ADMIN — Medication 100 MILLIEQUIVALENT(S): at 11:00

## 2024-01-01 RX ADMIN — ENOXAPARIN SODIUM 40 MILLIGRAM(S): 100 INJECTION SUBCUTANEOUS at 17:46

## 2024-01-01 RX ADMIN — Medication 3 MILLIGRAM(S): at 22:08

## 2024-01-01 RX ADMIN — FENTANYL CITRATE 1 PATCH: 50 INJECTION INTRAVENOUS at 17:56

## 2024-01-01 RX ADMIN — Medication 81 MILLIGRAM(S): at 12:52

## 2024-01-01 RX ADMIN — SODIUM CHLORIDE 75 MILLILITER(S): 9 INJECTION, SOLUTION INTRAVENOUS at 20:41

## 2024-01-01 RX ADMIN — MUPIROCIN 1 APPLICATION(S): 20 OINTMENT TOPICAL at 17:23

## 2024-01-01 RX ADMIN — SODIUM CHLORIDE 100 MILLILITER(S): 9 INJECTION INTRAMUSCULAR; INTRAVENOUS; SUBCUTANEOUS at 13:44

## 2024-01-04 NOTE — ED ADULT NURSE NOTE - OBJECTIVE STATEMENT
Pt was on his way out of his house when he fell on the concrete porch. Pt reports no LOC. Pt reports he fell on his buttox, and complains of pain in legs. pt denies fevers, Nausea or vomiting.

## 2024-01-04 NOTE — H&P ADULT - NSHPREVIEWOFSYSTEMS_GEN_ALL_CORE
REVIEW OF SYSTEMS:    CONSTITUTIONAL: (+) generalized weakness/ weight loss/ loss of appetite , no fevers or chills  EYES/ENT: No visual changes;  No vertigo or throat pain   NECK: No pain or stiffness  RESPIRATORY: No cough, wheezing, hemoptysis; No shortness of breath  CARDIOVASCULAR: No chest pain or palpitations  GASTROINTESTINAL: No abdominal or epigastric pain. No nausea, vomiting, or hematemesis; No diarrhea or constipation. No melena or hematochezia.  GENITOURINARY: No dysuria, frequency or hematuria  NEUROLOGICAL: No numbness or weakness  SKIN: No itching, burning, rashes, or lesions   All other review of systems is negative unless indicated above.

## 2024-01-04 NOTE — H&P ADULT - ATTENDING COMMENTS
Patient was seen and examined in ER with wife at bedside. Admitted for fall. Wife reports patient has been undergoing malignancy work up as outpatient for significant weight loss and poor appetite. Reports a fall this morning, when going to cardiologist office for follow up. His legs gave away, no presyncope or syncope. Wife reports patient has loss of balance for some time, recently worsening with loss of sensation in LE for a year.   Patient reports mild pain in his back due to fall. Denies hitting his head.     ICU Vital Signs Last 24 Hrs  T(C): 36.9 (04 Jan 2024 13:25), Max: 36.9 (04 Jan 2024 13:25)  T(F): 98.4 (04 Jan 2024 13:25), Max: 98.4 (04 Jan 2024 13:25)  HR: 96 (04 Jan 2024 13:25) (96 - 96)  BP: 109/58 (04 Jan 2024 13:25) (109/58 - 109/58)  BP(mean): --  ABP: --  ABP(mean): --  RR: 17 (04 Jan 2024 13:25) (17 - 17)  SpO2: 97% (04 Jan 2024 13:25) (97% - 97%)    O2 Parameters below as of 04 Jan 2024 13:25  Patient On (Oxygen Delivery Method): room air    P/E:  NAD  AAOx2, LLE muscle strength 4/5, otherwise strength 5/5 in all 4 extremities   No cervical tenderness   CTABL  S1S2 WNL  Abd soft, non tender, BS present   Right thigh hard mass  Condom catheter draining cloudy urine     labs noted     A/P:  Hypercalcemia of malignancy   Atrial fibrillation not on AC  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   Enlarged prostate, concern for malignancy   Renal complex mass  Lung nodules suspicious of malignancy   Leukocytosis, possible due to malignancy, ruled out leukemia with BM biopsy   Emphysema   CKD  HTN  HLD    Plan:   Corrected calcium 13, Start IVF, NS @100ml/hr   Start Calcitonin and bisphosphonate   Send PTH, PTHrp, Vitamin D level  Repeat BMP  not on any meds for afib  TTE  Cardiology consult   Oncology consult Dr. Mosqueda (patient follows Dr. Pugh [facility]) in am  Surgical oncology consult in am  Urology consult in am  Send UA to r/o UTI  cont home meds for HTN   Full code, MOLST completed  Plan of care was discussed with patient and wife; all questions and concerns were addressed and care was aligned with patient's wishes.  Discussed the plan with MAR Patient was seen and examined in ER with wife at bedside. Admitted for fall. Wife reports patient has been undergoing malignancy work up as outpatient for significant weight loss and poor appetite. Reports a fall this morning, when going to cardiologist office for follow up. His legs gave away, no presyncope or syncope. Wife reports patient has loss of balance for some time, recently worsening with loss of sensation in LE for a year.   Patient reports mild pain in his back due to fall. Denies hitting his head.     ICU Vital Signs Last 24 Hrs  T(C): 36.9 (04 Jan 2024 13:25), Max: 36.9 (04 Jan 2024 13:25)  T(F): 98.4 (04 Jan 2024 13:25), Max: 98.4 (04 Jan 2024 13:25)  HR: 96 (04 Jan 2024 13:25) (96 - 96)  BP: 109/58 (04 Jan 2024 13:25) (109/58 - 109/58)  BP(mean): --  ABP: --  ABP(mean): --  RR: 17 (04 Jan 2024 13:25) (17 - 17)  SpO2: 97% (04 Jan 2024 13:25) (97% - 97%)    O2 Parameters below as of 04 Jan 2024 13:25  Patient On (Oxygen Delivery Method): room air    P/E:  NAD  AAOx2, LLE muscle strength 4/5, otherwise strength 5/5 in all 4 extremities   No cervical tenderness   CTABL  S1S2 WNL  Abd soft, non tender, BS present   Right thigh hard mass  Condom catheter draining cloudy urine     labs noted     A/P:  Hypercalcemia of malignancy   Atrial fibrillation not on AC  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   Enlarged prostate, concern for malignancy   Renal complex mass  Lung nodules suspicious of malignancy   Leukocytosis, possible due to malignancy, ruled out leukemia with BM biopsy   Emphysema   CKD  HTN  HLD    Plan:   Corrected calcium 13, Start IVF, NS @100ml/hr   Start Calcitonin and bisphosphonate   Send PTH, PTHrp, Vitamin D level  Repeat BMP  not on any meds for afib  TTE  Cardiology consult   Oncology consult Dr. Mosqueda (patient follows Dr. Pugh [facility]) in am  Surgical oncology consult in am  Urology consult in am  Send UA to r/o UTI  cont home meds for HTN   Full code, MOLST completed  Plan of care was discussed with patient and wife; all questions and concerns were addressed and care was aligned with patient's wishes.  Discussed the plan with MAR Patient was seen and examined in ER with wife at bedside. Admitted for fall. Wife reports patient has been undergoing malignancy work up as outpatient for significant weight loss and poor appetite. Reports a fall this morning, when going to cardiologist office for follow up. His legs gave away, no presyncope or syncope. Wife reports patient has loss of balance for some time, recently worsening with loss of sensation in LE for a year.   Patient reports mild pain in his back due to fall. Denies hitting his head.     ICU Vital Signs Last 24 Hrs  T(C): 36.9 (04 Jan 2024 13:25), Max: 36.9 (04 Jan 2024 13:25)  T(F): 98.4 (04 Jan 2024 13:25), Max: 98.4 (04 Jan 2024 13:25)  HR: 96 (04 Jan 2024 13:25) (96 - 96)  BP: 109/58 (04 Jan 2024 13:25) (109/58 - 109/58)  BP(mean): --  ABP: --  ABP(mean): --  RR: 17 (04 Jan 2024 13:25) (17 - 17)  SpO2: 97% (04 Jan 2024 13:25) (97% - 97%)    O2 Parameters below as of 04 Jan 2024 13:25  Patient On (Oxygen Delivery Method): room air    P/E:  NAD  AAOx2, LLE muscle strength 4/5, otherwise strength 5/5 in all 4 extremities   No cervical tenderness   CTABL  S1S2 WNL  Abd soft, non tender, BS present   Right thigh hard mass  Condom catheter draining cloudy urine     labs noted     A/P:  Hypercalcemia of malignancy   Atrial fibrillation not on AC  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   Enlarged prostate, concern for malignancy   Renal complex mass  Lung nodules suspicious of malignancy   Leukocytosis, possible due to malignancy, ruled out leukemia with BM biopsy   Emphysema   CKD  HTN  HLD    Plan:   Corrected calcium 13, Start IVF, NS @100ml/hr   Start Calcitonin and bisphosphonate   Send PTH, PTHrp, Vitamin D level  Repeat BMP  not on any meds for afib  TTE  Cardiology consult   Oncology consult Dr. Mosqueda (patient follows Dr. Pugh [facility]) in am  Surgical oncology consult in am  Urology consult in am  Send UA to r/o UTI  cont home meds for HTN   Full code, MOLST completed  Plan of care was discussed with patient and wife; all questions and concerns were addressed and care was aligned with patient's wishes.  Discussed the plan with MAR Patient was seen and examined in ER with wife at bedside. Admitted for fall. Wife reports patient has been undergoing malignancy work up as outpatient for significant weight loss and poor appetite. Reports a fall this morning, when going to cardiologist office for follow up. His legs gave away, no presyncope or syncope. Wife reports patient has loss of balance for some time, recently worsening with loss of sensation in LE for a year.   Patient reports mild pain in his back due to fall. Denies hitting his head.     ICU Vital Signs Last 24 Hrs  T(C): 36.9 (04 Jan 2024 13:25), Max: 36.9 (04 Jan 2024 13:25)  T(F): 98.4 (04 Jan 2024 13:25), Max: 98.4 (04 Jan 2024 13:25)  HR: 96 (04 Jan 2024 13:25) (96 - 96)  BP: 109/58 (04 Jan 2024 13:25) (109/58 - 109/58)  BP(mean): --  ABP: --  ABP(mean): --  RR: 17 (04 Jan 2024 13:25) (17 - 17)  SpO2: 97% (04 Jan 2024 13:25) (97% - 97%)    O2 Parameters below as of 04 Jan 2024 13:25  Patient On (Oxygen Delivery Method): room air    P/E:  NAD  AAOx2, LLE muscle strength 4/5, otherwise strength 5/5 in all 4 extremities   No cervical tenderness   CTABL  S1S2 WNL  Abd soft, non tender, BS present   Right thigh hard mass  Condom catheter draining cloudy urine     labs noted     A/P:  Hypercalcemia of malignancy   Atrial fibrillation not on AC  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   Enlarged prostate, concern for malignancy   Renal complex mass  Lung nodules suspicious of malignancy   Leukocytosis, possible due to malignancy, ruled out leukemia with BM biopsy   Emphysema   CKD  HTN  HLD    Plan:   Corrected calcium 13, Start IVF, NS @100ml/hr   Start Calcitonin and bisphosphonate   Send PTH, PTHrp, Vitamin D level  Repeat BMP  not on any meds for afib  TTE  Cardiology consult   Oncology consult Dr. Mosqueda (patient follows Dr. Pugh [facility]) in am  Surgical oncology consult in am  Urology consult in am  Send UA to r/o UTI  cont home meds for HTN   Full code, MOLST completed  Plan of care was discussed with patient and wife; all questions and concerns were addressed and care was aligned with patient's wishes.  Discussed the plan with MAR Patient was seen and examined in ER with wife at bedside. Admitted for fall. Wife reports patient has been undergoing malignancy work up as outpatient for significant weight loss and poor appetite. Reports a fall this morning, when going to cardiologist office for follow up. His legs gave away, no presyncope or syncope. Wife reports patient has loss of balance for some time, recently worsening with loss of sensation in LE for a year.   Patient reports mild pain in his back due to fall. Denies hitting his head.     ICU Vital Signs Last 24 Hrs  T(C): 36.9 (04 Jan 2024 13:25), Max: 36.9 (04 Jan 2024 13:25)  T(F): 98.4 (04 Jan 2024 13:25), Max: 98.4 (04 Jan 2024 13:25)  HR: 96 (04 Jan 2024 13:25) (96 - 96)  BP: 109/58 (04 Jan 2024 13:25) (109/58 - 109/58)  BP(mean): --  ABP: --  ABP(mean): --  RR: 17 (04 Jan 2024 13:25) (17 - 17)  SpO2: 97% (04 Jan 2024 13:25) (97% - 97%)    O2 Parameters below as of 04 Jan 2024 13:25  Patient On (Oxygen Delivery Method): room air    P/E:  NAD  AAOx2, LLE muscle strength 4/5, otherwise strength 5/5 in all 4 extremities   No cervical tenderness   CTABL  S1S2 WNL  Abd soft, non tender, BS present   Right thigh hard mass  Condom catheter draining cloudy urine     labs noted     A/P:  Hypercalcemia of malignancy   Atrial fibrillation not on AC  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   Enlarged prostate, concern for malignancy   Renal complex mass  Lung nodules suspicious of malignancy   Leukocytosis, possible due to malignancy, ruled out leukemia with BM biopsy   Emphysema   CKD  HTN  HLD    Plan:   Corrected calcium 13, Start IVF, NS @100ml/hr   Send PTH, PTHrp, Vitamin D level, ionized calcium  Repeat BMP  not on any meds for afib  TTE  Cardiology consult   Oncology consult Dr. Mosqueda (patient follows Dr. Pugh [facility]) in am  Surgical oncology consult in am  Urology consult in am  Send UA to r/o UTI  cont home meds for HTN   Full code, MOLST completed  Plan of care was discussed with patient and wife; all questions and concerns were addressed and care was aligned with patient's wishes.  Discussed the plan with MAR Patient was seen and examined in ER with wife at bedside. Admitted for fall. Wife reports patient has been undergoing malignancy work up as outpatient for significant weight loss and poor appetite. Reports a fall this morning, when going to cardiologist office for follow up. His legs gave away, no presyncope or syncope. Wife reports patient has loss of balance for some time, recently worsening with loss of sensation in LE for a year.   Patient reports mild pain in his back due to fall. Denies hitting his head.     ICU Vital Signs Last 24 Hrs  T(C): 36.9 (04 Jan 2024 13:25), Max: 36.9 (04 Jan 2024 13:25)  T(F): 98.4 (04 Jan 2024 13:25), Max: 98.4 (04 Jan 2024 13:25)  HR: 96 (04 Jan 2024 13:25) (96 - 96)  BP: 109/58 (04 Jan 2024 13:25) (109/58 - 109/58)  BP(mean): --  ABP: --  ABP(mean): --  RR: 17 (04 Jan 2024 13:25) (17 - 17)  SpO2: 97% (04 Jan 2024 13:25) (97% - 97%)    O2 Parameters below as of 04 Jan 2024 13:25  Patient On (Oxygen Delivery Method): room air    P/E:  NAD  AAOx2, LLE muscle strength 4/5, otherwise strength 5/5 in all 4 extremities   No cervical tenderness   CTABL  S1S2 WNL  Abd soft, non tender, BS present   Right thigh hard mass  Condom catheter draining cloudy urine     labs noted     A/P:  Hypercalcemia of malignancy   Atrial fibrillation not on AC  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   Enlarged prostate, concern for malignancy   Renal complex mass  Lung nodules suspicious of malignancy   Leukocytosis, possible due to malignancy, ruled out leukemia with BM biopsy   Emphysema   CKD  HTN  HLD    Plan:   Corrected calcium 13, Start IVF, NS @100ml/hr   Send PTH, PTHrp, Vitamin D level, ionized calcium  Repeat BMP  not on any meds for afib  TTE  Cardiology consult   Oncology consult Dr. Mosqueda (patient follows Dr. Pugh [facility]) in am  Surgical oncology consult in am  Urology consult in am  Send UA to r/o UTI  cont home meds for HTN   Full code, MOLST completed  Plan of care was discussed with patient and wife; all questions and concerns were addressed and care was aligned with patient's wishes.  Discussed the plan with MAR Patient was seen and examined in ER with wife at bedside. Admitted for fall. Wife reports patient has been undergoing malignancy work up as outpatient for significant weight loss and poor appetite. Reports a fall this morning, when going to cardiologist office for follow up. His legs gave away, no presyncope or syncope. Wife reports patient has loss of balance for some time, recently worsening with loss of sensation in LE for a year.   Patient reports mild pain in his back due to fall. Denies hitting his head.     ICU Vital Signs Last 24 Hrs  T(C): 36.9 (04 Jan 2024 13:25), Max: 36.9 (04 Jan 2024 13:25)  T(F): 98.4 (04 Jan 2024 13:25), Max: 98.4 (04 Jan 2024 13:25)  HR: 96 (04 Jan 2024 13:25) (96 - 96)  BP: 109/58 (04 Jan 2024 13:25) (109/58 - 109/58)  BP(mean): --  ABP: --  ABP(mean): --  RR: 17 (04 Jan 2024 13:25) (17 - 17)  SpO2: 97% (04 Jan 2024 13:25) (97% - 97%)    O2 Parameters below as of 04 Jan 2024 13:25  Patient On (Oxygen Delivery Method): room air    P/E:  NAD  AAOx2, LLE muscle strength 4/5, otherwise strength 5/5 in all 4 extremities   No cervical tenderness   CTABL  S1S2 WNL  Abd soft, non tender, BS present   Right thigh hard mass  Condom catheter draining cloudy urine     labs noted     A/P:  Hypercalcemia of malignancy   Atrial fibrillation not on AC  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   Enlarged prostate, concern for malignancy   Renal complex mass  Lung nodules suspicious of malignancy   Leukocytosis, possible due to malignancy, ruled out leukemia with BM biopsy   Emphysema   CKD  HTN  HLD    Plan:   Corrected calcium 13, Start IVF, NS @100ml/hr   Send PTH, PTHrp, Vitamin D level, ionized calcium  Repeat BMP  not on any meds for afib  TTE  Cardiology consult   Oncology consult Dr. Mosqueda (patient follows Dr. Pugh [facility]) in am  Surgical oncology consult in am  Urology consult in am  Send UA to r/o UTI  cont home meds for HTN   Full code, MOLST completed  Plan of care was discussed with patient and wife; all questions and concerns were addressed and care was aligned with patient's wishes.  Discussed the plan with MAR Patient was seen and examined in ER with wife at bedside. Admitted for fall. Wife reports patient has been undergoing malignancy work up as outpatient for significant weight loss and poor appetite. Reports a fall this morning, when going to cardiologist office for follow up. His legs gave away, no presyncope or syncope. Wife reports patient has loss of balance for some time, recently worsening with loss of sensation in LE for a year.   Patient reports mild pain in his back due to fall. Denies hitting his head.     ICU Vital Signs Last 24 Hrs  T(C): 36.9 (04 Jan 2024 13:25), Max: 36.9 (04 Jan 2024 13:25)  T(F): 98.4 (04 Jan 2024 13:25), Max: 98.4 (04 Jan 2024 13:25)  HR: 96 (04 Jan 2024 13:25) (96 - 96)  BP: 109/58 (04 Jan 2024 13:25) (109/58 - 109/58)  BP(mean): --  ABP: --  ABP(mean): --  RR: 17 (04 Jan 2024 13:25) (17 - 17)  SpO2: 97% (04 Jan 2024 13:25) (97% - 97%)    O2 Parameters below as of 04 Jan 2024 13:25  Patient On (Oxygen Delivery Method): room air    P/E:  NAD  AAOx2, LLE muscle strength 4/5, otherwise strength 5/5 in all 4 extremities   No cervical tenderness   CTABL  S1S2 WNL  Abd soft, non tender, BS present   Right thigh hard mass  Condom catheter draining cloudy urine     labs noted     A/P:  Hypercalcemia of malignancy   Atrial fibrillation not on AC  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   Enlarged prostate, concern for malignancy   Renal complex mass  Lung nodules suspicious of malignancy   Leukocytosis, possible due to malignancy, ruled out leukemia with BM biopsy   Emphysema   CKD  HTN  HLD    Plan:   Corrected calcium 13, Start IVF, NS @100ml/hr   Send PTH, PTHrp, Vitamin D level, ionized calcium  Repeat BMP  not on any meds for afib  TTE  Cardiology consult   Oncology consult Dr. Mosqueda (patient follows Dr. P[facility]) in am  Surgical oncology consult in am  Urology consult in am  Send UA to r/o UTI  cont home meds for HTN   Full code, MOLST completed  Plan of care was discussed with patient and wife; all questions and concerns were addressed and care was aligned with patient's wishes.  Discussed the plan with MAR

## 2024-01-04 NOTE — H&P ADULT - NSHPPHYSICALEXAM_GEN_ALL_CORE
T(C): 36.9 (01-04-24 @ 13:25), Max: 36.9 (01-04-24 @ 13:25)  HR: 96 (01-04-24 @ 13:25) (96 - 96)  BP: 109/58 (01-04-24 @ 13:25) (109/58 - 109/58)  RR: 17 (01-04-24 @ 13:25) (17 - 17)  SpO2: 97% (01-04-24 @ 13:25) (97% - 97%)    CONSTITUTIONAL: Well groomed, no apparent distress, disheveled, thin elderly male, pleasant appearing   EYES: PERRLA and symmetric, EOMI, No conjunctival or scleral injection, non-icteric  ENMT: Oral mucosa with moist membranes.   RESP: No respiratory distress, no use of accessory muscles; CTA b/l, no WRR  CV: RRR, +S1S2, no MRG; no JVD; no peripheral edema  GI: Soft, NT, ND, no rebound, no guarding; no palpable masses; no hepatosplenomegaly; no hernia palpated : mckeon in place, draining cloudy dark yellow urine   MSK:  Normal ROM without pain, no spinal tenderness, normal muscle strength/tone (+) hard muscle/soft tissue palpated  in the left inner thigh   SKIN: No rashes or ulcers noted; no subcutaneous nodules or induration palpable  NEURO: CN II-XII intact; normal reflexes in upper and lower extremities, sensation intact in upper and lower extremities b/l to light touch   PSYCH: Appropriate insight/judgment; A+O x 3, mood and affect appropriate, recent/remote memory intact

## 2024-01-04 NOTE — H&P ADULT - PROBLEM SELECTOR PLAN 3
- Albumin 1.7, Ca 11 (corrected 13), according to BLANCHE, pt's Ca has been around 11 so this is not an acute finding  - start IVF 100cc/hr for 10 hours, no need for calcitonin/bisphosphonates at this time   - f/u PTH, PTHrp, Vitamin D level, ionized calcium  - QMA consult in the AM by primary team - Albumin 1.7, Ca 11 (corrected 13), according to BLANCHE, pt's Ca has been around 11 so this is not an acute finding  - start IVF 100cc/hr for 10 hours, no need for calcitriol/bisphosphonates at this time   - f/u PTH, PTHrp, Vitamin D level, ionized calcium  - QMA consult in the AM by primary team

## 2024-01-04 NOTE — H&P ADULT - PROBLEM SELECTOR PLAN 5
- BPH w/ elevated PSA and urianry retention  - mckeon placed in the ED   - netta flomax home med   - urology consult in the AM by primary team - BPH w/ elevated PSA and urianry retention  - mckeon placed in the ED   - c/w flomax home med   - urology consult in the AM by primary team

## 2024-01-04 NOTE — H&P ADULT - PROBLEM SELECTOR PLAN 7
- hx of vertigo, not on any medication - hx of vertigo, not on any medication  - no longer experiencing vertigo s/p meclizine and reglan in the ED   - CTH no change from prior   - continue to monitor

## 2024-01-04 NOTE — H&P ADULT - PROBLEM SELECTOR PLAN 6
- known hx of Afib, EKG in ED showed   - f/u TTE   - cardiology consult Dr. Wilson - known hx of Afib  - EKG in ED showed afib w/ RBBB, pt not on AC (refused blood thinners as per wife), and has been taking asa 325mg qd for many years until he stopped last year   - Stress test 8/2016 wnl   - f/u TTE   - cardiology consult Dr. Wilson  - pt in need of cardiac risk assessment prior to surgical oncology team intervention out-patient

## 2024-01-04 NOTE — ED PROVIDER NOTE - PROGRESS NOTE DETAILS
patient wbc 40+. per patient, has known elevated wbc, has had bone marrow biopsy for work up, negative for leukemia, still in the process to work up elevated wbc patient wbc 40+. per patient, has known elevated wbc, has had bone marrow biopsy for work up, negative for leukemia, still in the process to work up elevated wbc. will admit given multiple fall, will need pt/ot eval

## 2024-01-04 NOTE — H&P ADULT - PROBLEM SELECTOR PLAN 9
- hx of CVA 2013, on asa at home - hx of CVA 2013, on asa at home  - CT no new findings as above   - c/w asa

## 2024-01-04 NOTE — ED ADULT NURSE NOTE - NSFALLHARMRISKINTERV_ED_ALL_ED
Assistance OOB with selected safe patient handling equipment if applicable/Assistance with ambulation/Communicate risk of Fall with Harm to all staff, patient, and family/Monitor gait and stability/Provide patient with walking aids/Provide visual cue: red socks, yellow wristband, yellow gown, etc/Reinforce activity limits and safety measures with patient and family/Bed in lowest position, wheels locked, appropriate side rails in place/Call bell, personal items and telephone in reach/Instruct patient to call for assistance before getting out of bed/chair/stretcher/Non-slip footwear applied when patient is off stretcher/[place] to call system/Physically safe environment - no spills, clutter or unnecessary equipment/Purposeful Proactive Rounding/Room/bathroom lighting operational, light cord in reach Assistance OOB with selected safe patient handling equipment if applicable/Assistance with ambulation/Communicate risk of Fall with Harm to all staff, patient, and family/Monitor gait and stability/Provide patient with walking aids/Provide visual cue: red socks, yellow wristband, yellow gown, etc/Reinforce activity limits and safety measures with patient and family/Bed in lowest position, wheels locked, appropriate side rails in place/Call bell, personal items and telephone in reach/Instruct patient to call for assistance before getting out of bed/chair/stretcher/Non-slip footwear applied when patient is off stretcher/[place] to call system/Physically safe environment - no spills, clutter or unnecessary equipment/Purposeful Proactive Rounding/Room/bathroom lighting operational, light cord in reach

## 2024-01-04 NOTE — ED PROVIDER NOTE - OBJECTIVE STATEMENT
83 y.o presenting s/p fall. endorses feeling vertigo, weakness and fall. denies head injury, loc, n, v, abd pain. endorses that he has had multiple fall the past months. noting generalized weakness. denies cp, sob

## 2024-01-04 NOTE — H&P ADULT - PROBLEM SELECTOR PLAN 4
- pt has been undergoing a w/u with Dr. Pugh at [facility], according to family, bone marrow biopsy negative for leukemia  - [facility] w/u from paperwork brought in by wife:  1. 11/2023 MRI Prostate- 2.1 x 1.4 cm lesion in right anterior base to mid gland transition zone suspicious for prostate ca.   2. 11/2023 CT chest ab/p/soft tissues- complex 14 cm mass of the medial right upper thigh in the level of the gracilis muscle concerning for  malignant soft tissue neoplasm, pulmonary nodules w/ emphysema, complex 0.9 cm left renal lesion possibly neoplasm   3. 10/2023 MRI Brain non con - mild to mod cerebral atrophy and chronic microvascular ischemic changes in the white matter. Chronic right cerebellar infarct   - QMA, surgical onc, and urology consult in the AM by primary team - pt has been undergoing a w/u with Dr. Pugh at [facility], according to family, bone marrow biopsy negative for leukemia  - [facility] w/u from paperwork brought in by wife:  1. 11/2023 MRI Prostate- 2.1 x 1.4 cm lesion in right anterior base to mid gland transition zone suspicious for prostate ca.   2. 11/2023 CT chest ab/p/soft tissues- complex 14 cm mass of the medial right upper thigh in the level of the gracilis muscle concerning for  malignant soft tissue neoplasm, pulmonary nodules w/ emphysema, complex 0.9 cm left renal lesion possibly neoplasm   3. 10/2023 MRI Brain non con - mild to mod cerebral atrophy and chronic microvascular ischemic changes in the white matter. Chronic right cerebellar infarct   - QMA, surgical onc, and urology consult in the AM by primary team - pt has been undergoing a w/u with Dr. Pugh at [facility], according to family, bone marrow biopsy negative for leukemia  - [facility] w/u from paperwork brought in by wife:  1. 11/2023 MRI Prostate- 2.1 x 1.4 cm lesion in right anterior base to mid gland transition zone suspicious for prostate ca.   2. 11/2023 CT chest ab/p/soft tissues- complex 14 cm mass of the medial right upper thigh in the level of the gracilis muscle concerning for  malignant soft tissue neoplasm, pulmonary nodules w/ emphysema, complex 0.9 cm left renal lesion possibly neoplasm   3. 10/2023 MRI Brain non con - mild to mod cerebral atrophy and chronic microvascular ischemic changes in the white matter. Chronic right cerebellar infarct   - QMA, surgical onc, and urology consult in the AM by primary team - pt has been undergoing a w/u with Dr. Pugh at [facility], according to family, bone marrow biopsy negative for leukemia  - [facility] w/u from paperwork brought in by wife:  1. 11/2023 MRI Prostate- 2.1 x 1.4 cm lesion in right anterior base to mid gland transition zone suspicious for prostate ca.   2. 11/2023 CT chest ab/p/soft tissues- complex 14 cm mass of the medial right upper thigh in the level of the gracilis muscle concerning for  malignant soft tissue neoplasm, pulmonary nodules w/ emphysema, complex 0.9 cm left renal lesion possibly neoplasm   3. 10/2023 MRI Brain non con - mild to mod cerebral atrophy and chronic microvascular ischemic changes in the white matter. Chronic right cerebellar infarct   - QMA, surgical onc, and urology consult in the AM by primary team

## 2024-01-04 NOTE — H&P ADULT - PROBLEM SELECTOR PLAN 1
- presenting s/p fall this afternoon on the way to his cardiologist. w/ multiple falls in the past few months, and increasing weakness in light of his malignancy workup   - Vitals wnl.   - Labs significant for WC 47, INR 1.49, Albumin 1.7, Ca 11 (corrected 13), AlkP 154.   - CTH shows no changes from CTH in 11/2023 no signs of hemorrhage   - Xray show no fractures.   - CXR shows scattered non-specific pulmonary infiltrates.   - Admitted for ambulatory dysfunction.   - PT consult   - fall risk percautions - presenting s/p fall this afternoon on the way to his cardiologist. w/ multiple falls in the past few months, and increasing weakness in light of his malignancy workup   - Vitals wnl.   - Labs significant for WC 47, INR 1.49, Albumin 1.7, Ca 11 (corrected 13), AlkP 154.   - CTH shows no changes from CTH in 11/2023 no signs of hemorrhage   - Xray show no fractures.   - CXR shows scattered non-specific pulmonary infiltrates.   - Admitted for ambulatory dysfunction.   - PT consult   - fall risk precautions

## 2024-01-04 NOTE — ED ADULT NURSE NOTE - ED STAT RN HANDOFF DETAILS 3
Patient is alert and oriented x3. No sign of acute distress noted. Report given. Patient is transferred to [facility] via stretcher in stable condition. Patient is alert and oriented x3. No sign of acute distress noted. Report given. Patient is transferred to [facility] via stretcher in stable condition.

## 2024-01-04 NOTE — H&P ADULT - ASSESSMENT
Pt is a 83 y.o M w/ PMHx of BPH w/ urinary retention, stroke (2013), emphysema, afib not on ac, dementia, erectile dysfunction, vertigo, leukocytosis of unknown origin (currently being worked up at [facility] Dr. 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 Vitals wnl. Labs significant for WC 47, INR 1.49, Albumin 1.7, Ca 11 (corrected 13), AlkP 154. CTH shows no changes from CTH in 11/2023. Xray show no fractures. CXR shows scattered non-specific pulmonary infiltrates. Admitted for ambulatory dysfunction.    Pt is a 83 y.o M w/ PMHx of BPH w/ urinary retention, stroke (2013), emphysema, afib not on ac, dementia, erectile dysfunction, vertigo, leukocytosis of unknown origin (currently being worked up at [facility] Dr. 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 Vitals wnl. Labs significant for WC 47, INR 1.49, Albumin 1.7, Ca 11 (corrected 13), AlkP 154. CTH shows no changes from CTH in 11/2023. Xray show no fractures. CXR shows scattered non-specific pulmonary infiltrates. Admitted for ambulatory dysfunction.    Pt is a 83 y.o M w/ PMHx of BPH w/ urinary retention, stroke (2013), emphysema, afib not on ac, dementia, erectile dysfunction, vertigo, leukocytosis of unknown origin (currently being worked up at [facility] Dr. 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 Vitals wnl. Labs significant for WC 47, INR 1.49, Albumin 1.7, Ca 11 (corrected 13), AlkP 154. CTH shows no changes from CTH in 11/2023. Xray show no fractures. CXR shows scattered non-specific pulmonary infiltrates. Admitted for ambulatory dysfunction.    Pt is a 83 y.o M w/ PMHx of BPH w/ urinary retention, stroke (2013), emphysema, afib not on ac, dementia, erectile dysfunction, vertigo, leukocytosis of unknown origin (currently being worked up at [facility] Dr. 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 Vitals wnl. Labs significant for WC 47, INR 1.49, Albumin 1.7, Ca 11 (corrected 13), AlkP 154. CTH shows no changes from CTH in 11/2023. Xray show no fractures. CXR shows scattered non-specific pulmonary infiltrates. Admitted for ambulatory dysfunction.

## 2024-01-04 NOTE — ED ADULT NURSE NOTE - ED STAT RN HANDOFF DETAILS
Pt awake alert, no acute distress noted. awaiting for inpatient bed assignment. Endorses to TIKA Reece for continuation of care.

## 2024-01-04 NOTE — H&P ADULT - PROBLEM SELECTOR PLAN 2
- WC 42, baseline around 20 as per HIE   - pt has been undergoing a w/u with Dr. Pugh at [facility], according to family, bone marrow biopsy negative for leukemia  - continue to monitor   - will send U/A to r/o UTI (pt asymptomatic, but with cloudy urine in [place]), r/o infection   - QMA consult in the AM by primary team - WC 42, baseline around 20 as per HIE   - pt has been undergoing a w/u with Dr. Pugh at [facility], according to family, bone marrow biopsy negative for leukemia  - continue to monitor   - will send U/A to r/o UTI (pt asymptomatic, but with cloudy urine in [place]), r/o infection   - QMA consult in the AM by primary team - WC 42, baseline around 20 as per HIE   - pt has been undergoing a w/u with Dr. Pugh at [facility], according to family, bone marrow biopsy negative for leukemia  - continue to monitor   - will send U/A to r/o UTI (pt asymptomatic, but with cloudy urine in [place]), r/o infection   - QMA consult in the AM by primary team - WC 42, baseline around 20 as per HIE   - pt has been undergoing a w/u with Dr. Pugh at [facility], according to family, bone marrow biopsy negative for leukemia  - continue to monitor   - will send U/A to r/o UTI (pt asymptomatic, but with cloudy urine in [place]), r/o infection   - QMA consult in the AM by primary team

## 2024-01-04 NOTE — H&P ADULT - HISTORY OF PRESENT ILLNESS
Pt is a 83 y.o M w/ PMHx of BPH w/ urinary retention, stroke (2013), HTN, DM, afib not on ac, dementia, erectile dysfunction, vertigo, leukocytosis of unknown origin (currently being worked up at [facility] Dr. 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 Wife in the HCP and is at bedside assisting in history taking. This was purely a mechanical fall as per patient and family, denies LOC, hitting head. States that he has vertigo and balance issues, but he has also been very weak, especially in the past three months. Pt endorses losing 60 pounds in the past 1.5 years, and wife endorses that he has had decreased appetite for the past 3 months. Pt also has intermittent night sweats. Pt and wife have noticed a right thigh mass, that they saw surgical oncologist Dr. Pugh for out-patient, and in order to do a biopsy on the mass, pt need cardiac clearance (which is why they were going to the cardiologist this morning). According to the wife, pt has also been seeing Dr. Domingo for his BPH and questionable prostate cancer. Denies dizziness currently, chest pain, sob, palpitations, fevers, chills, sick contacts, recent travel , urinary symptoms.     [facility] w/u from paperwork brought in by wife:  1. 11/2023 MRI Prostate- 2.1 x 1.4 cm lesion in right anterior base to mid gland transition zone suspicious for prostate ca.   2. 11/2023 CT chest ab/p/soft tissues- complex 14 cm mass of the medial right upper thigh in the level of the gracilis muscle concerning for  malignant soft tissue neoplasm, pulmonary nodules w/ emphysema, complex 0.9 cm left renal lesion possibly neoplasm   3. 10/2023 MRI Brain non con - mild to mod cerebral atrophy and chronic microvascular ischemic changes in the white matter. Chronic right cerebellar infarct  Pt is a 83 y.o M w/ PMHx of BPH w/ urinary retention, stroke (2013), HTN, DM, afib not on ac, dementia, erectile dysfunction, vertigo, leukocytosis of unknown origin (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 Wife in the HCP and is at bedside assisting in history taking. This was purely a mechanical fall as per patient and family, denies LOC, hitting head. States that he has vertigo and balance issues, but he has also been very weak, especially in the past three months. Pt endorses losing 60 pounds in the past 1.5 years, and wife endorses that he has had decreased appetite for the past 3 months. Pt also has intermittent night sweats. Pt and wife have noticed a right thigh mass, that they saw surgical oncologist Dr. Pugh for out-patient, and in order to do a biopsy on the mass, pt need cardiac clearance (which is why they were going to the cardiologist this morning). According to the wife, pt has also been seeing Dr. Domingo for his BPH and questionable prostate cancer. Denies dizziness currently, chest pain, sob, palpitations, fevers, chills, sick contacts, recent travel , urinary symptoms.     [facility] brought in by wife:  1. 11/2023 MRI Prostate- 2.1 x 1.4 cm lesion in right anterior base to mid gland transition zone suspicious for prostate ca.   2. 11/2023 CT chest ab/p/soft tissues- complex 14 cm mass of the medial right upper thigh in the level of the gracilis muscle concerning for  malignant soft tissue neoplasm, pulmonary nodules w/ emphysema, complex 0.9 cm left renal lesion possibly neoplasm   3. 10/2023 MRI Brain non con - mild to mod cerebral atrophy and chronic microvascular ischemic changes in the white matter. Chronic right cerebellar infarct  Pt is a 83 y.o M w/ PMHx of BPH w/ urinary retention, stroke (2013), HTN, DM, afib not on ac, dementia, erectile dysfunction, vertigo, leukocytosis of unknown origin (currently being worked up at [facility] Dr. 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 Wife in the HCP and is at bedside assisting in history taking. This was purely a mechanical fall as per patient and family, denies LOC, hitting head. States that he has vertigo and balance issues, but he has also been very weak, especially in the past three months. Pt endorses losing 60 pounds in the past 1.5 years, and wife endorses that he has had decreased appetite for the past 3 months. Pt also has intermittent night sweats. Pt and wife have noticed a right thigh mass, that they saw surgical oncologist Dr. Pugh for out-patient, and in order to do a biopsy on the mass, pt need cardiac clearance (which is why they were going to the cardiologist this morning). According to the wife, pt has also been seeing Dr. Domingo for his BPH and questionable prostate cancer. Denies dizziness currently, chest pain, sob, palpitations, fevers, chills, sick contacts, recent travel , urinary symptoms.     [facility] w/u from paperwork brought in by wife:  1. 11/2023 MRI Prostate- 2.1 x 1.4 cm lesion in right anterior base to mid gland transition zone suspicious for prostate ca.   2. 11/2023 CT chest ab/p/soft tissues- complex 14 cm mass of the medial right upper thigh in the level of the gracilis muscle concerning for  malignant soft tissue neoplasm, pulmonary nodules w/ emphysema, complex 0.9 cm left renal lesion possibly neoplasm   3. 10/2023 MRI Brain non con - mild to mod cerebral atrophy and chronic microvascular ischemic changes in the white matter. Chronic right cerebellar infarct  Pt is a 83 y.o M w/ PMHx of BPH w/ urinary retention, stroke (2013), HTN, DM, afib not on ac, dementia, erectile dysfunction, vertigo, leukocytosis of unknown origin (currently being worked up at [facility] Dr. 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 Wife in the HCP and is at bedside assisting in history taking. This was purely a mechanical fall as per patient and family, denies LOC, hitting head. States that he has vertigo and balance issues, but he has also been very weak, especially in the past three months. Pt endorses losing 60 pounds in the past 1.5 years, and wife endorses that he has had decreased appetite for the past 3 months. Pt also has intermittent night sweats. Pt and wife have noticed a right thigh mass, that they saw surgical oncologist Dr. Pugh for out-patient, and in order to do a biopsy on the mass, pt need cardiac clearance (which is why they were going to the cardiologist this morning). According to the wife, pt has also been seeing Dr. Domingo for his BPH and questionable prostate cancer. Denies dizziness currently, chest pain, sob, palpitations, fevers, chills, sick contacts, recent travel , urinary symptoms.     [facility] w/u from paperwork brought in by wife:  1. 11/2023 MRI Prostate- 2.1 x 1.4 cm lesion in right anterior base to mid gland transition zone suspicious for prostate ca.   2. 11/2023 CT chest ab/p/soft tissues- complex 14 cm mass of the medial right upper thigh in the level of the gracilis muscle concerning for  malignant soft tissue neoplasm, pulmonary nodules w/ emphysema, complex 0.9 cm left renal lesion possibly neoplasm   3. 10/2023 MRI Brain non con - mild to mod cerebral atrophy and chronic microvascular ischemic changes in the white matter. Chronic right cerebellar infarct

## 2024-01-04 NOTE — ED PROVIDER NOTE - CLINICAL SUMMARY MEDICAL DECISION MAKING FREE TEXT BOX
Patient presenting for weakness, fall. endorses multiple fall. will obtain lab, r.o acs, anemia. lyte abnormality. fluid hydrate, ro injury. ed obs and reassess

## 2024-01-05 NOTE — CONSULT NOTE ADULT - ASSESSMENT
82 y/o male s/p mechanical fall w/ thigh mass, poss malignancy with hx of BPH/elevated PSA  VSS, Cr WNL, UA negative    Plan   - repeat PSA  - no acute urologic intervention as inpt  - pt to follow up as an outpatient with Dr. Hunt or Dr. Domingo   - continue flomax daily  - monitor UO   - follow surg onc recommendations  - follow heme/onc recommendations   - remainder of care as per primary team     Jessica Hunt  SURGERY  [address] [place] [address]  Phone: [phone]  Fax: [phone]     82 y/o male s/p mechanical fall w/ thigh mass, poss malignancy with hx of BPH/elevated PSA  VSS, Cr WNL, UA negative    Plan   - repeat PSA  - no acute urologic intervention as inpt  - pt to follow up as an outpatient with Dr. Hunt or Dr. Domingo   - continue flomax daily  - monitor UO   - follow surg onc recommendations  - follow heme/onc recommendations   - remainder of care as per primary team     Jessica Hunt  SURGERY  [address] [place] [address]  Phone: [phone]  Fax: [phone]     82 y/o male s/p mechanical fall w/ thigh mass, poss malignancy with hx of BPH/elevated PSA  VSS, Cr WNL, UA negative    Plan   - repeat PSA  - no acute urologic intervention as inpt  - continue flomax daily  - monitor UO   - follow surg onc recommendations  - follow heme/onc recommendations   - remainder of care as per primary team

## 2024-01-05 NOTE — PATIENT PROFILE ADULT - FUNCTIONAL ASSESSMENT - DAILY ACTIVITY SCORE.
[facility] General Surgery Consultation    Maggie Navarrete MRN# [id number]   YOB: 1952 Age: 69 year old      Date of Admission:  12/28/2021  Date of Consult: 12/28/2021         Assessment and Plan:   Maggie Navarrete is a 69 year old female with recurrent small bowel obstruction, likely partial. Patient overall abides by plant-based high-fiber diet. Surgical hx of hysterectomy for leiomyosarcoma, gyn laparoscopy and myomectomy, bladder surgery, and C section.    PLAN:  - Abdominal exam overall benign and will proceed with conservative management.  - Keep NPO with IV fluids and IV protonix for GI prophylaxis.   - Okay to hold on NG tube. Place is worsening abdominal pain, nausea, and vomiting.  - Likely proceed with gastrografin contrast challenge this evening for recurrent SBO. Will discuss with Dr. Mack.  - Abdominal film in the morning.  - Recheck white count in the morning.   - Will consult dietitian this hospitalization for education on low fiber diet to avoid SBO in the future.         Requesting Physician:      Dr. Harvey        Chief Complaint:     Chief Complaint   Patient presents with     Abdominal Pain     Nausea & Vomiting          History of Present Illness:   Maggie Navarrete is a 69 year old female who presented to ED with 1 day of lower abdominal pain. Patient reports the pain started early yesterday morning and was associated with nausea and several emeses. She denies hematochezia or coffee ground emesis. She had 4 small BM's yesterday that were normal in character. The lower abdominal pain feels like cramping pain and felt similar to her previous small bowel obstruction that she was hospitalized for on 11/5. The difference, is the pain did not go away on its own. Last hospitalization, gastrografin contrast challenge wasn't needed because bowel function returned prior to being seen by surgery team. Maggie states she continues to eat high-fiber foods since she mainly  abides by a plant-based diet. She states prior to this episode she had a bunch of fruits and vegetables, she denies eating lentils which appeared to cause her other two small bowel obstructions. For leiomyosarcoma, she's had several pelvic surgeries including hysterectomy, gyn laparoscopy, and myomectomy. She also has history of bladder surgery and C section. Labs in the ED revealed leukocytosis of 16.8 with left shift, Hgb of 15.8, unremarkable CMP, and normal lipase. CT abdomen and pelvis with contrast visualized mechanical small bowel obstruction with transition point in the anterior left lower quadrant.           Physical Exam:   Blood pressure (!) 143/80, pulse 87, temperature 97.7  F (36.5  C), temperature source Temporal, resp. rate 13, weight 63.5 kg (140 lb), SpO2 99 %, not currently breastfeeding.  140 lbs 0 oz  General: Vital signs reviewed, in no apparent distress  Eyes: Anicteric  HENT: Normocephalic, atraumatic, trachea midline   Respiratory: Breathing nonlabored  Cardiovascular: Regular rate and rhythm  GI: Abdomen minimally distended but soft, minimally tender in lower abdomen without rebound or guarding  Musculoskeletal: No gross deformities  Neurologic: Grossly nonfocal exam  Psychiatric: Normal mood, affect and insight  Integumentary: Warm and dry         Past Medical History:     Past Medical History:   Diagnosis Date     Anxiety      Arthritis     pain in feet and knees     Disorder of bone and cartilage, unspecified      Esophageal reflux 2015     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     GERD (gastroesophageal reflux disease)      Hx of previous reproductive problem 1980     Kidney stone 2011     Leiomyosarcoma (H) 11/14/2016     Personal history of colonic polyps 2016    Small, benign     PONV (postoperative nausea and vomiting)      Posterior vitreous detachment of left eye 2011     Posterior vitreous detachment of right eye 2011     Ptosis of eyelid, bilateral       Sepsis (H) 07/2020     Stomach problems 2015    Abdominal pain, Diarreha     Thyroid disease     thyroid nodule resolved 2014            Past Surgical History:     Past Surgical History:   Procedure Laterality Date     ABDOMEN SURGERY  Now    Pain, diarrhea     BREAST SURGERY  2002    right breast benign     COLONOSCOPY  2008    due in 2018     COLONOSCOPY N/A 09/08/2016    Procedure: COMBINED COLONOSCOPY, SINGLE OR MULTIPLE BIOPSY/POLYPECTOMY BY BIOPSY;  Surgeon: Abner Pepper MD;  Location:  GI     COLONOSCOPY N/A 10/18/2021    Procedure: COLONOSCOPY;  Surgeon: Jamila Villarreal MD;  [place] [facility] [place]     ENT SURGERY  1975    tonsillectomy     GENITOURINARY SURGERY  1953    bladder surgery      GI SURGERY  2015    Severe constipation     GYN SURGERY  1977    C section     GYN SURGERY  1981    laparoscopy     GYN SURGERY  2007    myomectomy     hysterecomy  11/14/2016    leiomyosarcoma     OTHER SURGICAL HISTORY Right 10/27/2017    right thyroid lobectomy for follicular adenoma with Hurthle cell features.     REMOVE PORT VASCULAR ACCESS Right 05/09/2017    Procedure: REMOVE PORT VASCULAR ACCESS;  Right Port Removal;  Surgeon: Eric Gibson PA-C;  Location: [facility] OR     REPAIR PTOSIS BILATERAL  02/15/2012    Procedure:REPAIR PTOSIS BILATERAL; BILATERAL UPPER LID PTOSIS REPAIR; Surgeon:BRYCE REYNOLDS; Location: [facility]     TUMOR REMOVAL  06/2020            Current Medications:           ondansetron                    Home Medications:     Prior to Admission medications    Medication Sig Last Dose Taking? Auth Provider   alendronate (FOSAMAX) 70 MG tablet Take 1 tablet (70 mg) by mouth every 7 days 12/26/2021 at takes Sunday Yes Kristine Flynn MD PhD   CALCIUM PO Take 1 capsule by mouth daily as needed (if diet insufficient to provide daily recommended values) Past Week at Unknown time Yes Unknown, Entered By History   carboxymethylcellulose PF (REFRESH PLUS) 0.5 % ophthalmic solution  Place 1 drop into both eyes 4 times daily as needed for dry eyes 12/27/2021 at PM Yes Unknown, Entered By History   famotidine (PEPCID) 10 MG tablet Take 10 mg by mouth 2 times daily as needed 12/27/2021 at Unknown time Yes Unknown, Entered By History   levothyroxine (SYNTHROID/LEVOTHROID) 75 MCG tablet Take 1 tablet (75 mcg) by mouth daily 12/27/2021 at AM Yes Rhea Lin MD   multivitamin w/minerals (THERA-VIT-M) tablet Take 1 tablet by mouth daily as needed (if diet insufficient to provide recommended nutrients) Past Week at Unknown time Yes Unknown, Entered By History   ondansetron (ZOFRAN-ODT) 4 MG ODT tab Take 1 tablet (4 mg) by mouth every 6 hours as needed for nausea or vomiting 12/27/2021 at Unknown time Yes Ahsan Pineda MD   venlafaxine (EFFEXOR-XR) 75 MG 24 hr capsule Take 1 capsule (75 mg) by mouth daily 12/27/2021 at AM Yes Rhea Lin MD   zolpidem (AMBIEN) 5 MG tablet Take 1 tablet (5 mg) by mouth nightly as needed for sleep (Only uses if traveling or cannot sleep,) Past Month at Unknown time Yes Rhea Lin MD            Allergies:     Allergies   Allergen Reactions     Erythromycin GI Disturbance     Tramadol Nausea            Family History:     Family History   Problem Relation Age of Onset     Family History Negative Mother         glaucoma     Glaucoma Mother      Anxiety Disorder Mother      Heart Failure Mother      Diabetes Mother      Coronary Artery Disease Mother      Hypertension Mother      Breast Cancer Mother      Depression Mother      Obesity Mother      Heart Disease Father      Glaucoma Father      Diabetes Father      Coronary Artery Disease Father      Depression Father      Osteoporosis Father      Heart Disease Maternal Grandmother      Diabetes Maternal Grandmother      Heart Disease Paternal Grandmother      Cancer Paternal Grandfather         stomach     Thyroid Disease Sister      Retinal detachment Sister      Hypertension Sister       Hypertension Sister      Lipids Sister      Cerebrovascular Disease Sister      Depression Sister      Heart Disease Paternal Aunt      Heart Disease Paternal Uncle      Anxiety Disorder Sister      Depression Sister      Thyroid Disease Sister      Thyroid Disease Sister      Osteoporosis Other            Social History:   Maggie Navarrete  reports that she has never smoked. She has never used smokeless tobacco. She reports current alcohol use. She reports that she does not use drugs.          Review of Systems:   The 12 point Review of Systems is negative other than noted in the HPI.    Constitutional: No fevers or chills  Eyes: No blurred or double vision  HENT: Denies headaches, No rhinorrhea, No sore throat  Respiratory: No cough or shortness of breath  Cardiovascular: Denies chest pain or palpitations  Gastrointestinal: + for abdominal pain or nausea/vomiting  Genitourinary: No hematuria or dysuria  Musculoskeletal: Denies arthralgias or myalgias  Neurologic: No numbness or tingling  Integumentary: No skin rashes         Labs/Imaging   All new lab and imaging data was reviewed.   Recent Labs   Lab 12/28/21  0200   WBC 16.8*   HGB 15.8*   HCT 49.0*   MCV 90        Recent Labs   Lab 12/28/21  0200      POTASSIUM 4.7   CHLORIDE 104   CO2 27   ANIONGAP 6   *   BUN 23   CR 0.98   GFRESTIMATED 61   SUSAN 10.0   PROTTOTAL 7.3   ALBUMIN 3.8   BILITOTAL 0.7   ALKPHOS 80   AST 13   ALT 25       I have personally reviewed the imaging studies-   CT ABD PELVIS W CONTRAST 12/28  IMPRESSION:   1.  Mechanical small bowel obstruction with transition point in the anterior left lower quadrant. No free air.      Irma Saldaña PA-C    45 minutes spent on date of the encounter doing patient visit, chart review, and documentation.     6

## 2024-01-05 NOTE — CONSULT NOTE ADULT - SUBJECTIVE AND OBJECTIVE BOX
Reason for consult:    HPI:  Pt is a 83 y.o M w/ PMHx of BPH w/ urinary retention, stroke (2013), HTN, DM, afib not on ac, dementia, erectile dysfunction, vertigo, leukocytosis of unknown origin (currently being worked up at [facility] Dr. 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 Wife in the HCP and is at bedside assisting in history taking. This was purely a mechanical fall as per patient and family, denies LOC, hitting head. States that he has vertigo and balance issues, but he has also been very weak, especially in the past three months. Pt endorses losing 60 pounds in the past 1.5 years, and wife endorses that he has had decreased appetite for the past 3 months. Pt also has intermittent night sweats. Pt and wife have noticed a right thigh mass, that they saw surgical oncologist Dr. Pugh for out-patient, and in order to do a biopsy on the mass, pt need cardiac clearance (which is why they were going to the cardiologist this morning). According to the wife, pt has also been seeing Dr. Domingo for his BPH and questionable prostate cancer. Denies dizziness currently, chest pain, sob, palpitations, fevers, chills, sick contacts, recent travel , urinary symptoms.     [facility] w/u from paperwork brought in by wife:  1. 11/2023 MRI Prostate- 2.1 x 1.4 cm lesion in right anterior base to mid gland transition zone suspicious for prostate ca.   2. 11/2023 CT chest ab/p/soft tissues- complex 14 cm mass of the medial right upper thigh in the level of the gracilis muscle concerning for  malignant soft tissue neoplasm, pulmonary nodules w/ emphysema, complex 0.9 cm left renal lesion possibly neoplasm   3. 10/2023 MRI Brain non con - mild to mod cerebral atrophy and chronic microvascular ischemic changes in the white matter. Chronic right cerebellar infarct  (04 Jan 2024 18:35)      PAST MEDICAL & SURGICAL HISTORY:  BPH with elevated PSA      History of stroke      HTN (hypertension)      Vertigo      Afib          FAMILY HISTORY:      Alochol: Denied  Smoking: Nonsmoker  Drug Use: Denied  Marital Status:         Allergies    Seafood (Unknown)  No Known Drug Allergies    Intolerances        MEDICATIONS  (STANDING):  aspirin  chewable 81 milliGRAM(s) Oral daily  donepezil 5 milliGRAM(s) Oral at bedtime  sodium chloride 0.9%. 1000 milliLiter(s) (100 mL/Hr) IV Continuous <Continuous>  tamsulosin 0.4 milliGRAM(s) Oral at bedtime    MEDICATIONS  (PRN):      ROS  No fever, sweats, chills  No epistaxis, HA, sore throat  No CP, SOB, cough, sputum  No n/v/d, abd pain, melena, hematochezia  No edema  No rash  No anxiety  No back pain, joint pain  No bleeding, bruising  No dysuria, hematuria    T(C): 36.4 (01-05-24 @ 07:44), Max: 36.9 (01-04-24 @ 13:25)  HR: 93 (01-05-24 @ 07:44) (87 - 100)  BP: 134/63 (01-05-24 @ 07:44) (106/68 - 150/78)  RR: 18 (01-05-24 @ 07:44) (17 - 18)  SpO2: 96% (01-05-24 @ 07:44) (96% - 97%)  Wt(kg): --    PE  NAD  Awake, alert  Anicteric, MMM  RRR  CTAB  Abd soft, NT, ND  No c/c/e  No rash grossly  FROM                          8.9    45.53 )-----------( 385      ( 05 Jan 2024 06:00 )             29.6       01-05    139  |  106  |  12  ----------------------------<  78  3.7   |  26  |  0.79    Ca    10.2      05 Jan 2024 06:00  Phos  3.0     01-05  Mg     1.9     01-05    TPro  5.8<L>  /  Alb  1.6<L>  /  TBili  0.6  /  DBili  0.3  /  AST  16  /  ALT  18  /  AlkPhos  144<H>  01-05   Reason for consult:    HPI:  Pt is a 83 y.o M w/ PMHx of BPH w/ urinary retention, stroke (2013), HTN, DM, afib not on ac, dementia, erectile dysfunction, vertigo, leukocytosis of unknown origin (currently being worked up at [facility] Dr. 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 Wife in the HCP and is at bedside assisting in history taking. This was purely a mechanical fall as per patient and family, denies LOC, hitting head. States that he has vertigo and balance issues, but he has also been very weak, especially in the past three months. Pt endorses losing 60 pounds in the past 1.5 years, and wife endorses that he has had decreased appetite for the past 3 months. Pt also has intermittent night sweats. Pt and wife have noticed a right thigh mass, that they saw surgical oncologist Dr. Pugh for out-patient, and in order to do a biopsy on the mass, pt need cardiac clearance (which is why they were going to the cardiologist this morning). According to the wife, pt has also been seeing Dr. Domingo for his BPH and questionable prostate cancer. Denies dizziness currently, chest pain, sob, palpitations, fevers, chills, sick contacts, recent travel , urinary symptoms.     [facility] w/u from paperwork brought in by wife:  1. 11/2023 MRI Prostate- 2.1 x 1.4 cm lesion in right anterior base to mid gland transition zone suspicious for prostate ca.   2. 11/2023 CT chest ab/p/soft tissues- complex 14 cm mass of the medial right upper thigh in the level of the gracilis muscle concerning for  malignant soft tissue neoplasm, pulmonary nodules w/ emphysema, complex 0.9 cm left renal lesion possibly neoplasm   3. 10/2023 MRI Brain non con - mild to mod cerebral atrophy and chronic microvascular ischemic changes in the white matter. Chronic right cerebellar infarct  (04 Jan 2024 18:35)      PAST MEDICAL & SURGICAL HISTORY:  BPH with elevated PSA      History of stroke      HTN (hypertension)      Vertigo      Afib          FAMILY HISTORY:      Alochol: Denied  Smoking: Nonsmoker  Drug Use: Denied  Marital Status:         Allergies    Seafood (Unknown)  No Known Drug Allergies    Intolerances        MEDICATIONS  (STANDING):  aspirin  chewable 81 milliGRAM(s) Oral daily  donepezil 5 milliGRAM(s) Oral at bedtime  sodium chloride 0.9%. 1000 milliLiter(s) (100 mL/Hr) IV Continuous <Continuous>  tamsulosin 0.4 milliGRAM(s) Oral at bedtime    MEDICATIONS  (PRN):      ROS  No fever, sweats, chills  No epistaxis, HA, sore throat  No CP, SOB, cough, sputum  No n/v/d, abd pain, melena, hematochezia  No edema  No rash  No anxiety  No back pain, joint pain  No bleeding, bruising  No dysuria, hematuria    T(C): 36.4 (01-05-24 @ 07:44), Max: 36.9 (01-04-24 @ 13:25)  HR: 93 (01-05-24 @ 07:44) (87 - 100)  BP: 134/63 (01-05-24 @ 07:44) (106/68 - 150/78)  RR: 18 (01-05-24 @ 07:44) (17 - 18)  SpO2: 96% (01-05-24 @ 07:44) (96% - 97%)  Wt(kg): --    PE  NAD  Awake, alert  Anicteric, MMM  RRR  CTAB  Abd soft, NT, ND  No c/c/e  No rash grossly  FROM                          8.9    45.53 )-----------( 385      ( 05 Jan 2024 06:00 )             29.6       01-05    139  |  106  |  12  ----------------------------<  78  3.7   |  26  |  0.79    Ca    10.2      05 Jan 2024 06:00  Phos  3.0     01-05  Mg     1.9     01-05    TPro  5.8<L>  /  Alb  1.6<L>  /  TBili  0.6  /  DBili  0.3  /  AST  16  /  ALT  18  /  AlkPhos  144<H>  01-05   Pt known to our service, pt of Dr Pugh. Briefly, 83 y.o M w/ PMHx of BPH w/ urinary retention, stroke (2013), HTN, DM, afib not on ac, dementia, erectile dysfunction, vertigo, leukocytosis of unknown [id number] cm right gracilus muscle mass, presenting s/p fall this afternoon on the way to his cardiologist. Wife in the HCP and is at bedside assisting in history taking. This was purely a mechanical fall as per patient and family, denies LOC, hitting head. States that he has vertigo and balance issues, but he has also been very weak, especially in the past three months. Pt endorses losing 60 pounds in the past 1.5 years, and wife endorses that he has had decreased appetite for the past 3 months. Pt also has intermittent night sweats. Pt and wife have noticed a right thigh mass, that they saw surgical oncologist Dr. Pugh for out-patient, and in order to do a biopsy on the mass, pt need cardiac clearance (which is why they were going to the cardiologist this morning). According to the wife, pt has also been seeing Dr. Domingo for his BPH and questionable prostate cancer. Denies dizziness currently, chest pain, sob, palpitations, fevers, chills, sick contacts, recent travel , urinary symptoms.     [facility] w/u from paperwork brought in by wife:  1. 11/2023 MRI Prostate- 2.1 x 1.4 cm lesion in right anterior base to mid gland transition zone suspicious for prostate ca.   2. 11/2023 CT chest ab/p/soft tissues- complex 14 cm mass of the medial right upper thigh in the level of the gracilis muscle concerning for  malignant soft tissue neoplasm, pulmonary nodules w/ emphysema, complex 0.9 cm left renal lesion possibly neoplasm   3. 10/2023 MRI Brain non con - mild to mod cerebral atrophy and chronic microvascular ischemic changes in the white matter. Chronic right cerebellar infarct  (04 Jan 2024 18:35)    12/07/2023: Bone marrow biopsy and aspirate:  ­ Hypercellular marrow at 30%. Trilineage hematopoiesis.  ­ Negative fish studies for bcr­ABL.  ­ Negative molecular studies for common drive her DNA mutations and RNA fusions associated with myeloid neoplasms including  CSF 3R mutation.  ­ Pending cytogenetics.  ­ No morphologic or immunophenotypic evidence of plasma cell neoplasm, lymphoproliferative disorders, high­grade MDS or acute  leukemia or metastatic carcinoma.  ­ Comment: Morphologically, no overt atypical features to me the diagnostic criteria of MPN are appreciated. Considering negative  ancillary studies for common changes associated with MPN, evaluation of potential causes of reactive neutrophilia including  infection, drug/medication effect, acute stress, tissue necrosis, smoking, autoimmune disease etc. is recommended for complete  interpretation.      Patient is a limited historian today, very hard of hearing, could not converse well with him.  History obtained from records    PAST MEDICAL & SURGICAL HISTORY:  BPH with elevated PSA      History of stroke      HTN (hypertension)      Vertigo      Afib          FAMILY HISTORY: none      Alochol: Denied  Smoking: Nonsmoker  Drug Use: Denied  Marital Status:         Allergies    Seafood (Unknown)  No Known Drug Allergies    Intolerances        MEDICATIONS  (STANDING):  aspirin  chewable 81 milliGRAM(s) Oral daily  donepezil 5 milliGRAM(s) Oral at bedtime  sodium chloride 0.9%. 1000 milliLiter(s) (100 mL/Hr) IV Continuous <Continuous>  tamsulosin 0.4 milliGRAM(s) Oral at bedtime    MEDICATIONS  (PRN):      ROS  UTO 2/2 participation    T(C): 36.4 (01-05-24 @ 07:44), Max: 36.9 (01-04-24 @ 13:25)  HR: 93 (01-05-24 @ 07:44) (87 - 100)  BP: 134/63 (01-05-24 @ 07:44) (106/68 - 150/78)  RR: 18 (01-05-24 @ 07:44) (17 - 18)  SpO2: 96% (01-05-24 @ 07:44) (96% - 97%)  Wt(kg): --    PE  NAD  Awake, alert  limited 2/2 participation                          8.9    45.53 )-----------( 385      ( 05 Jan 2024 06:00 )             29.6       01-05    139  |  106  |  12  ----------------------------<  78  3.7   |  26  |  0.79    Ca    10.2      05 Jan 2024 06:00  Phos  3.0     01-05  Mg     1.9     01-05    TPro  5.8<L>  /  Alb  1.6<L>  /  TBili  0.6  /  DBili  0.3  /  AST  16  /  ALT  18  /  AlkPhos  144<H>  01-05   Pt known to our service, pt of Dr Pugh. Briefly, 83 y.o M w/ PMHx of BPH w/ urinary retention, stroke (2013), HTN, DM, afib not on ac, dementia, erectile dysfunction, vertigo, leukocytosis of unknown [id number] cm right gracilus muscle mass, presenting s/p fall this afternoon on the way to his cardiologist. Wife in the HCP and is at bedside assisting in history taking. This was purely a mechanical fall as per patient and family, denies LOC, hitting head. States that he has vertigo and balance issues, but he has also been very weak, especially in the past three months. Pt endorses losing 60 pounds in the past 1.5 years, and wife endorses that he has had decreased appetite for the past 3 months. Pt also has intermittent night sweats. Pt and wife have noticed a right thigh mass, that they saw surgical oncologist Dr. Pugh for out-patient, and in order to do a biopsy on the mass, pt need cardiac clearance (which is why they were going to the cardiologist this morning). According to the wife, pt has also been seeing Dr. Domingo for his BPH and questionable prostate cancer. Denies dizziness currently, chest pain, sob, palpitations, fevers, chills, sick contacts, recent travel , urinary symptoms.     [facility] w/u from paperwork brought in by wife:  1. 11/2023 MRI Prostate- 2.1 x 1.4 cm lesion in right anterior base to mid gland transition zone suspicious for prostate ca.   2. 11/2023 CT chest ab/p/soft tissues- complex 14 cm mass of the medial right upper thigh in the level of the gracilis muscle concerning for  malignant soft tissue neoplasm, pulmonary nodules w/ emphysema, complex 0.9 cm left renal lesion possibly neoplasm   3. 10/2023 MRI Brain non con - mild to mod cerebral atrophy and chronic microvascular ischemic changes in the white matter. Chronic right cerebellar infarct  (04 Jan 2024 18:35)    12/07/2023: Bone marrow biopsy and aspirate:  ­ Hypercellular marrow at 30%. Trilineage hematopoiesis.  ­ Negative fish studies for bcr­ABL.  ­ Negative molecular studies for common drive her DNA mutations and RNA fusions associated with myeloid neoplasms including  CSF 3R mutation.  ­ Pending cytogenetics.  ­ No morphologic or immunophenotypic evidence of plasma cell neoplasm, lymphoproliferative disorders, high­grade MDS or acute  leukemia or metastatic carcinoma.  ­ Comment: Morphologically, no overt atypical features to me the diagnostic criteria of MPN are appreciated. Considering negative  ancillary studies for common changes associated with MPN, evaluation of potential causes of reactive neutrophilia including  infection, drug/medication effect, acute stress, tissue necrosis, smoking, autoimmune disease etc. is recommended for complete  interpretation.      Patient is a limited historian today, very hard of hearing, could not converse well with him.  History obtained from records    PAST MEDICAL & SURGICAL HISTORY:  BPH with elevated PSA      History of stroke      HTN (hypertension)      Vertigo      Afib          FAMILY HISTORY: none      Alochol: Denied  Smoking: Nonsmoker  Drug Use: Denied  Marital Status:         Allergies    Seafood (Unknown)  No Known Drug Allergies    Intolerances        MEDICATIONS  (STANDING):  aspirin  chewable 81 milliGRAM(s) Oral daily  donepezil 5 milliGRAM(s) Oral at bedtime  sodium chloride 0.9%. 1000 milliLiter(s) (100 mL/Hr) IV Continuous <Continuous>  tamsulosin 0.4 milliGRAM(s) Oral at bedtime    MEDICATIONS  (PRN):      ROS  UTO 2/2 participation    T(C): 36.4 (01-05-24 @ 07:44), Max: 36.9 (01-04-24 @ 13:25)  HR: 93 (01-05-24 @ 07:44) (87 - 100)  BP: 134/63 (01-05-24 @ 07:44) (106/68 - 150/78)  RR: 18 (01-05-24 @ 07:44) (17 - 18)  SpO2: 96% (01-05-24 @ 07:44) (96% - 97%)  Wt(kg): --    PE  NAD  Awake, alert  limited 2/2 participation                          8.9    45.53 )-----------( 385      ( 05 Jan 2024 06:00 )             29.6       01-05    139  |  106  |  12  ----------------------------<  78  3.7   |  26  |  0.79    Ca    10.2      05 Jan 2024 06:00  Phos  3.0     01-05  Mg     1.9     01-05    TPro  5.8<L>  /  Alb  1.6<L>  /  TBili  0.6  /  DBili  0.3  /  AST  16  /  ALT  18  /  AlkPhos  144<H>  01-05

## 2024-01-05 NOTE — CONSULT NOTE ADULT - SUBJECTIVE AND OBJECTIVE BOX
UROLOGY CONSULT   84 y/o male with past medical history of BPH w/ elevated PSA, stroke (>10 years ago), vertigo, HTN, dementia, hearing loss, thigh mass presents to [facility] s/p mechanical fall and AMS. Urology consulted for history of BPH, hx of urinary retention. Pt seen and examined at beside. Pt is currently AxOx1, wife at bedside aided in history. Admits to coming to hospital s/p fall. Pt has had numerous falls recently, and mental status deteriorated over the last 48 hours- pt is unable to answer questions, attempting to get out of bed. Urologically, Admits pt struggled with urinary incontinence, takes Flomax prescription. States pt followed up with Dr. Domingo for BPH and findings of elevated PSA in 2018- they have not followed up since. Lost follow up due to concerns with oncology appointments 2/2 large thigh mass. Pt follows with Dr. Pugh and had upcoming appt with Dr. Birmingham this month. Admits to weak stream/incontinence but pt able to empty bladder. Denies dysuria, hematuria, inability to urinate. Denies past urologic procedures. Denies fever, chills, nausea, vomiting.     As per chart review, last recommendation included repeating PSA levels in 6 months following appointment in 10/2018.     PAST MEDICAL & SURGICAL HISTORY:  BPH with elevated PSA  History of stroke  HTN (hypertension)  Vertigo  Afib    MEDICATIONS  (STANDING):  aspirin  chewable 81 milliGRAM(s) Oral daily  donepezil 5 milliGRAM(s) Oral at bedtime  sodium chloride 0.9%. 1000 milliLiter(s) (100 mL/Hr) IV Continuous <Continuous>  tamsulosin 0.4 milliGRAM(s) Oral at bedtime    MEDICATIONS  (PRN):      Allergies    Seafood (Unknown)  No Known Drug Allergies    Intolerances        Vital Signs Last 24 Hrs  T(C): 36.5 (05 Jan 2024 12:45), Max: 36.9 (05 Jan 2024 01:56)  T(F): 97.7 (05 Jan 2024 12:45), Max: 98.5 (05 Jan 2024 01:56)  HR: 105 (05 Jan 2024 12:45) (87 - 105)  BP: 131/63 (05 Jan 2024 12:45) (106/68 - 150/78)  BP(mean): --  RR: 18 (05 Jan 2024 12:45) (18 - 18)  SpO2: 97% (05 Jan 2024 12:45) (96% - 97%)    Parameters below as of 05 Jan 2024 12:45  Patient On (Oxygen Delivery Method): room air        Physical:  Gen: NAD. Agitated.   Resp: Unlabored breathing. Equal chest rise bilaterally.   Abd: Soft ND, NT to palpation.   : No suprapubic tenderness. Bladder scan minimal cc detected. Pt urinating in bed pan as per wife.         I&O's Detail      LABS:                        8.9    45.53 )-----------( 385      ( 05 Jan 2024 06:00 )             29.6              01-05    139  |  106  |  12  ----------------------------<  78  3.7   |  26  |  0.79    Ca    10.2      05 Jan 2024 06:00  Phos  3.0     01-05  Mg     1.9     01-05    TPro  5.8<L>  /  Alb  1.6<L>  /  TBili  0.6  /  DBili  0.3  /  AST  16  /  ALT  18  /  AlkPhos  144<H>  01-05            PT/INR - ( 04 Jan 2024 14:01 )   PT: 16.8 sec;   INR: 1.49 ratio         PTT - ( 04 Jan 2024 14:01 )  PTT:40.3 sec  Urinalysis Basic - ( 05 Jan 2024 06:00 )    Color: x / Appearance: x / SG: x / pH: x  Gluc: 78 mg/dL / Ketone: x  / Bili: x / Urobili: x   Blood: x / Protein: x / Nitrite: x   Leuk Esterase: x / RBC: x / WBC x   Sq Epi: x / Non Sq Epi: x / Bacteria: x        RADIOLOGY & ADDITIONAL STUDIES:    83y.o. Male with UROLOGY CONSULT   82 y/o male with past medical history of BPH w/ elevated PSA, stroke (>10 years ago), vertigo, HTN, dementia, hearing loss, thigh mass presents to [facility] s/p mechanical fall and AMS. Urology consulted for history of BPH, hx of urinary retention. Pt seen and examined at beside. Pt is currently AxOx1, wife at bedside aided in history. Admits to coming to hospital s/p fall. Pt has had numerous falls recently, and mental status deteriorated over the last 48 hours- pt is unable to answer questions, attempting to get out of bed. Urologically, Admits pt struggled with urinary incontinence, takes Flomax prescription. States pt followed up with Dr. Domingo for BPH and findings of elevated PSA in 2018- they have not followed up since. Lost follow up due to concerns with oncology appointments 2/2 large thigh mass. Pt follows with Dr. Pugh and had upcoming appt with Dr. Birmingham this month. Admits to weak stream/incontinence but pt able to empty bladder. Denies dysuria, hematuria, inability to urinate. Denies past urologic procedures. Denies fever, chills, nausea, vomiting.     As per chart review, last recommendation included repeating PSA levels in 6 months following appointment in 10/2018.     PAST MEDICAL & SURGICAL HISTORY:  BPH with elevated PSA  History of stroke  HTN (hypertension)  Vertigo  Afib    MEDICATIONS  (STANDING):  aspirin  chewable 81 milliGRAM(s) Oral daily  donepezil 5 milliGRAM(s) Oral at bedtime  sodium chloride 0.9%. 1000 milliLiter(s) (100 mL/Hr) IV Continuous <Continuous>  tamsulosin 0.4 milliGRAM(s) Oral at bedtime    MEDICATIONS  (PRN):      Allergies    Seafood (Unknown)  No Known Drug Allergies    Intolerances        Vital Signs Last 24 Hrs  T(C): 36.5 (05 Jan 2024 12:45), Max: 36.9 (05 Jan 2024 01:56)  T(F): 97.7 (05 Jan 2024 12:45), Max: 98.5 (05 Jan 2024 01:56)  HR: 105 (05 Jan 2024 12:45) (87 - 105)  BP: 131/63 (05 Jan 2024 12:45) (106/68 - 150/78)  BP(mean): --  RR: 18 (05 Jan 2024 12:45) (18 - 18)  SpO2: 97% (05 Jan 2024 12:45) (96% - 97%)    Parameters below as of 05 Jan 2024 12:45  Patient On (Oxygen Delivery Method): room air        Physical:  Gen: NAD. Agitated.   Resp: Unlabored breathing. Equal chest rise bilaterally.   Abd: Soft ND, NT to palpation.   : No suprapubic tenderness. Bladder scan minimal cc detected. Pt urinating in bed pan as per wife.         I&O's Detail      LABS:                        8.9    45.53 )-----------( 385      ( 05 Jan 2024 06:00 )             29.6              01-05    139  |  106  |  12  ----------------------------<  78  3.7   |  26  |  0.79    Ca    10.2      05 Jan 2024 06:00  Phos  3.0     01-05  Mg     1.9     01-05    TPro  5.8<L>  /  Alb  1.6<L>  /  TBili  0.6  /  DBili  0.3  /  AST  16  /  ALT  18  /  AlkPhos  144<H>  01-05            PT/INR - ( 04 Jan 2024 14:01 )   PT: 16.8 sec;   INR: 1.49 ratio         PTT - ( 04 Jan 2024 14:01 )  PTT:40.3 sec  Urinalysis Basic - ( 05 Jan 2024 06:00 )    Color: x / Appearance: x / SG: x / pH: x  Gluc: 78 mg/dL / Ketone: x  / Bili: x / Urobili: x   Blood: x / Protein: x / Nitrite: x   Leuk Esterase: x / RBC: x / WBC x   Sq Epi: x / Non Sq Epi: x / Bacteria: x        RADIOLOGY & ADDITIONAL STUDIES:    83y.o. Male with

## 2024-01-05 NOTE — CONSULT NOTE ADULT - NS ATTEND AMEND GEN_ALL_CORE FT
Pt seen and examined. Agree with note as written above. Per wife, pt already has an outside urologist who is following his PSA closely. Has appt with him next month.    - Plan as above  - FU without his urologist as outpt

## 2024-01-05 NOTE — CONSULT NOTE ADULT - ASSESSMENT
leukocytosis --  no symptoms of infection currently, may be reactive or related to worsening underlying malignancy.  His WBC in November of 2023 was 28.  --  Bone marrow biopsy performed in December was unremarkable and nondiagnostic  -- eval for infectious causes in the acute setting given significant rise     thigh lesion --  per imaging in November of 2023, patient was noted to have a complex 14 cm mass in the medial right upper thigh worrisome for malignant soft tissue neoplasm.  --  Follow-up surgery evaluation for possible biopsy     possible metastatic cancer --  with elevated PSA, thigh lesion concerning for sarcoma, patient certainly may have metastatic prostate cancer or sarcoma  --   Patient was to have a biopsy as outpatient  -- would repeat CT of the chest, abdomen, and pelvis with contrast at this time to restage.  Would biopsy concerning metastatic lesion if possible     elevated PSA, outpatient MRI of the prostate concerning for prostate cancer, PI-RADS 5.  Most recent PSA in office was in October when it was about 15  -- f/u  eval  -- repeat PSA here    renal complex cyst -- noted in outpt imaging  -- f/u  as well     anemia --  hemoglobin in office in November was about 12.  Acutely decreased at this time  -- may be related to underlying malignancies  -- check iron panel, B12, folic acid, ferritin, haptoglobin  -- check stool guaiac     hypercalcemia --  continue IV fluids, endocrine or renal consult please     will follow

## 2024-01-05 NOTE — PROGRESS NOTE ADULT - SUBJECTIVE AND OBJECTIVE BOX
Patient is a 83y old  Male who presents with a chief complaint of frequent falls, weakness (05 Jan 2024 11:01)    Patient was seen and examined at bedside this morning   Reports were unable to sleep last night, says his medical issues are not solved yet. Reports pain in medial right thigh   Later NP reports patient was delirious, was coming out of bed with high risk of fall     INTERVAL HPI/OVERNIGHT EVENTS:  T(C): 36.5 (01-05-24 @ 12:45), Max: 36.9 (01-05-24 @ 01:56)  HR: 105 (01-05-24 @ 12:45) (87 - 105)  BP: 131/63 (01-05-24 @ 12:45) (106/68 - 150/78)  RR: 18 (01-05-24 @ 12:45) (18 - 18)  SpO2: 97% (01-05-24 @ 12:45) (96% - 97%)  Wt(kg): --  I&O's Summary      REVIEW OF SYSTEMS: denies fever, chills, SOB, palpitations, chest pain, abdominal pain, nausea, vomiting, diarrhea, constipation, dizziness    MEDICATIONS  (STANDING):  aspirin  chewable 81 milliGRAM(s) Oral daily  donepezil 5 milliGRAM(s) Oral at bedtime  sodium chloride 0.9%. 1000 milliLiter(s) (100 mL/Hr) IV Continuous <Continuous>  tamsulosin 0.4 milliGRAM(s) Oral at bedtime    MEDICATIONS  (PRN):      PHYSICAL EXAM: before delirium  NAD  AAOx2, muscle strength 4/5 in all extremities   CTABL  S1S2 WNL  Abd soft, non tender, BS present   Right thigh hard mass          LABS:                        8.9    45.53 )-----------( 385      ( 05 Jan 2024 06:00 )             29.6     01-05    139  |  106  |  12  ----------------------------<  78  3.7   |  26  |  0.79    Ca    10.2      05 Jan 2024 06:00  Phos  3.0     01-05  Mg     1.9     01-05    TPro  5.8<L>  /  Alb  1.6<L>  /  TBili  0.6  /  DBili  0.3  /  AST  16  /  ALT  18  /  AlkPhos  144<H>  01-05    PT/INR - ( 04 Jan 2024 14:01 )   PT: 16.8 sec;   INR: 1.49 ratio         PTT - ( 04 Jan 2024 14:01 )  PTT:40.3 sec  Urinalysis Basic - ( 05 Jan 2024 06:00 )    Color: x / Appearance: x / SG: x / pH: x  Gluc: 78 mg/dL / Ketone: x  / Bili: x / Urobili: x   Blood: x / Protein: x / Nitrite: x   Leuk Esterase: x / RBC: x / WBC x   Sq Epi: x / Non Sq Epi: x / Bacteria: x      CAPILLARY BLOOD GLUCOSE

## 2024-01-05 NOTE — PATIENT PROFILE ADULT - FALL HARM RISK - HARM RISK INTERVENTIONS
Assistance with ambulation/Assistance OOB with selected safe patient handling equipment/Communicate Risk of Fall with Harm to all staff/Discuss with provider need for PT consult/Monitor for mental status changes/Monitor gait and stability/Move patient closer to nurses' station/Reinforce activity limits and safety measures with patient and family/Reorient to person, place and time as needed/Tailored Fall Risk Interventions/Toileting schedule using arm’s reach rule for commode and bathroom/Use of alarms - bed, chair and/or voice tab/Visual Cue: Yellow wristband and red socks/Bed in lowest position, wheels locked, appropriate side rails in place/Call bell, personal items and telephone in reach/Instruct patient to call for assistance before getting out of bed or chair/Non-slip footwear when patient is out of bed/[place] to call system/Physically safe environment - no spills, clutter or unnecessary equipment/Purposeful Proactive Rounding/Room/bathroom lighting operational, light cord in reach Assistance with ambulation/Assistance OOB with selected safe patient handling equipment/Communicate Risk of Fall with Harm to all staff/Discuss with provider need for PT consult/Monitor for mental status changes/Monitor gait and stability/Move patient closer to nurses' station/Reinforce activity limits and safety measures with patient and family/Reorient to person, place and time as needed/Tailored Fall Risk Interventions/Toileting schedule using arm’s reach rule for commode and bathroom/Use of alarms - bed, chair and/or voice tab/Visual Cue: Yellow wristband and red socks/Bed in lowest position, wheels locked, appropriate side rails in place/Call bell, personal items and telephone in reach/Instruct patient to call for assistance before getting out of bed or chair/Non-slip footwear when patient is out of bed/[place] to call system/Physically safe environment - no spills, clutter or unnecessary equipment/Purposeful Proactive Rounding/Room/bathroom lighting operational, light cord in reach

## 2024-01-05 NOTE — CHART NOTE - NSCHARTNOTEFT_GEN_A_CORE
EVENT:  notified by RN pt had critical wbc 45.53      HPI:  Pt is a 83 y.o M w/ PMHx of BPH w/ urinary retention, stroke (2013), HTN, DM, afib not on ac, dementia, erectile dysfunction, vertigo, leukocytosis of unknown origin (currently being worked up at [facility] Dr. 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 Wife in the HCP and is at bedside assisting in history taking. This was purely a mechanical fall as per patient and family, denies LOC, hitting head. States that he has vertigo and balance issues, but he has also been very weak, especially in the past three months. Pt endorses losing 60 pounds in the past 1.5 years, and wife endorses that he has had decreased appetite for the past 3 months. Pt also has intermittent night sweats. Pt and wife have noticed a right thigh mass, that they saw surgical oncologist Dr. Pugh for out-patient, and in order to do a biopsy on the mass, pt need cardiac clearance (which is why they were going to the cardiologist this morning). According to the wife, pt has also been seeing Dr. Domingo for his BPH and questionable prostate cancer. Denies dizziness currently, chest pain, sob, palpitations, fevers, chills, sick contacts, recent travel , urinary symptoms.     [facility] w/u from paperwork brought in by wife:  1. 11/2023 MRI Prostate- 2.1 x 1.4 cm lesion in right anterior base to mid gland transition zone suspicious for prostate ca.   2. 11/2023 CT chest ab/p/soft tissues- complex 14 cm mass of the medial right upper thigh in the level of the gracilis muscle concerning for  malignant soft tissue neoplasm, pulmonary nodules w/ emphysema, complex 0.9 cm left renal lesion possibly neoplasm   3. 10/2023 MRI Brain non con - mild to mod cerebral atrophy and chronic microvascular ischemic changes in the white matter. Chronic right cerebellar infarct  (04 Jan 2024 18:35)        OBJECTIVE:  Vital Signs Last 24 Hrs  T(C): 36.4 (05 Jan 2024 07:44), Max: 36.9 (04 Jan 2024 13:25)  T(F): 97.6 (05 Jan 2024 07:44), Max: 98.5 (05 Jan 2024 01:56)  HR: 93 (05 Jan 2024 07:44) (87 - 100)  BP: 134/63 (05 Jan 2024 07:44) (106/68 - 150/78)  BP(mean): --  RR: 18 (05 Jan 2024 07:44) (17 - 18)  SpO2: 96% (05 Jan 2024 07:44) (96% - 97%)    Parameters below as of 05 Jan 2024 07:44  Patient On (Oxygen Delivery Method): room air        LABS:                        8.9    45.53 )-----------( 385      ( 05 Jan 2024 06:00 )             29.6     01-05    139  |  106  |  12  ----------------------------<  78  3.7   |  26  |  0.79    Ca    10.2      05 Jan 2024 06:00  Phos  3.0     01-05  Mg     1.9     01-05    TPro  5.8<L>  /  Alb  1.6<L>  /  TBili  0.6  /  DBili  0.3  /  AST  16  /  ALT  18  /  AlkPhos  144<H>  01-05      ASSESSMENT:  1. noted wbc 45.53    PLAN:   1. pt being w/u for malignancy  2. heme/onc on board    FOLLOW UP/RESULTS:    1. f/u heme/onc reccs EVENT:  notified by RN pt had critical wbc 45.53      HPI:  Pt is a 83 y.o M w/ PMHx of BPH w/ urinary retention, stroke (2013), HTN, DM, afib not on ac, dementia, erectile dysfunction, vertigo, leukocytosis of unknown origin (currently being worked up at [facility] Dr. 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 Wife in the HCP and is at bedside assisting in history taking. This was purely a mechanical fall as per patient and family, denies LOC, hitting head. States that he has vertigo and balance issues, but he has also been very weak, especially in the past three months. Pt endorses losing 60 pounds in the past 1.5 years, and wife endorses that he has had decreased appetite for the past 3 months. Pt also has intermittent night sweats. Pt and wife have noticed a right thigh mass, that they saw surgical oncologist Dr. Pugh for out-patient, and in order to do a biopsy on the mass, pt need cardiac clearance (which is why they were going to the cardiologist this morning). According to the wife, pt has also been seeing Dr. Domingo for his BPH and questionable prostate cancer. Denies dizziness currently, chest pain, sob, palpitations, fevers, chills, sick contacts, recent travel , urinary symptoms.     [facility] w/u from paperwork brought in by wife:  1. 11/2023 MRI Prostate- 2.1 x 1.4 cm lesion in right anterior base to mid gland transition zone suspicious for prostate ca.   2. 11/2023 CT chest ab/p/soft tissues- complex 14 cm mass of the medial right upper thigh in the level of the gracilis muscle concerning for  malignant soft tissue neoplasm, pulmonary nodules w/ emphysema, complex 0.9 cm left renal lesion possibly neoplasm   3. 10/2023 MRI Brain non con - mild to mod cerebral atrophy and chronic microvascular ischemic changes in the white matter. Chronic right cerebellar infarct  (04 Jan 2024 18:35)        OBJECTIVE:  Vital Signs Last 24 Hrs  T(C): 36.4 (05 Jan 2024 07:44), Max: 36.9 (04 Jan 2024 13:25)  T(F): 97.6 (05 Jan 2024 07:44), Max: 98.5 (05 Jan 2024 01:56)  HR: 93 (05 Jan 2024 07:44) (87 - 100)  BP: 134/63 (05 Jan 2024 07:44) (106/68 - 150/78)  BP(mean): --  RR: 18 (05 Jan 2024 07:44) (17 - 18)  SpO2: 96% (05 Jan 2024 07:44) (96% - 97%)    Parameters below as of 05 Jan 2024 07:44  Patient On (Oxygen Delivery Method): room air        LABS:                        8.9    45.53 )-----------( 385      ( 05 Jan 2024 06:00 )             29.6     01-05    139  |  106  |  12  ----------------------------<  78  3.7   |  26  |  0.79    Ca    10.2      05 Jan 2024 06:00  Phos  3.0     01-05  Mg     1.9     01-05    TPro  5.8<L>  /  Alb  1.6<L>  /  TBili  0.6  /  DBili  0.3  /  AST  16  /  ALT  18  /  AlkPhos  144<H>  01-05      ASSESSMENT:  1. noted wbc 45.53    PLAN:   1. pt being w/u for malignancy  2. heme/onc on board    FOLLOW UP/RESULTS:    1. f/u heme/onc reccs

## 2024-01-05 NOTE — PATIENT PROFILE ADULT - FUNCTIONAL ASSESSMENT - BASIC MOBILITY 6.
1-calculated by average/Not able to assess (calculate score using [facility] averaging method) 1-calculated by average/Not able to assess (calculate score using [facility] averaging method)

## 2024-01-05 NOTE — CONSULT NOTE ADULT - SUBJECTIVE AND OBJECTIVE BOX
C A R D I O L O G Y  *********************    DATE OF SERVICE: 01-05-24    HISTORY OF PRESENT ILLNESS:   Pt is a 83 y.o M w/ PMHx of BPH w/ urinary retention, stroke (2013), HTN, DM, afib not on ac, dementia, erectile dysfunction, vertigo, leukocytosis of unknown origin (currently being worked up at [facility] Dr. 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 Wife in the HCP and is at bedside assisting in history taking. This was purely a mechanical fall as per patient and family, denies LOC, hitting head. States that he has vertigo and balance issues, but he has also been very weak, especially in the past three months. Pt endorses losing 60 pounds in the past 1.5 years, and wife endorses that he has had decreased appetite for the past 3 months. Pt also has intermittent night sweats. Pt and wife have noticed a right thigh mass, that they saw surgical oncologist Dr. Pugh for out-patient, and in order to do a biopsy on the mass, pt need cardiac clearance (which is why they were going to the cardiologist this morning). According to the wife, pt has also been seeing Dr. Domingo for his BPH and questionable prostate cancer. Denies dizziness currently, chest pain, sob, palpitations, fevers, chills, sick contacts, recent travel , urinary symptoms.     [facility] w/u from paperwork brought in by wife:  1. 11/2023 MRI Prostate- 2.1 x 1.4 cm lesion in right anterior base to mid gland transition zone suspicious for prostate ca.   2. 11/2023 CT chest ab/p/soft tissues- complex 14 cm mass of the medial right upper thigh in the level of the gracilis muscle concerning for  malignant soft tissue neoplasm, pulmonary nodules w/ emphysema, complex 0.9 cm left renal lesion possibly neoplasm   3. 10/2023 MRI Brain non con - mild to mod cerebral atrophy and chronic microvascular ischemic changes in the white matter. Chronic right cerebellar infarct  (04 Jan 2024 18:35)      PAST MEDICAL & SURGICAL HISTORY:  BPH with elevated PSA  History of stroke  HTN (hypertension)  Vertigo  Afib      MEDICATIONS:  MEDICATIONS  (STANDING):  aspirin  chewable 81 milliGRAM(s) Oral daily  donepezil 5 milliGRAM(s) Oral at bedtime  sodium chloride 0.9%. 1000 milliLiter(s) (100 mL/Hr) IV Continuous <Continuous>  tamsulosin 0.4 milliGRAM(s) Oral at bedtime      Allergies    Seafood (Unknown)  No Known Drug Allergies    Intolerances        FAMILY HISTORY:    Non-contributary for premature coronary disease or sudden cardiac death    SOCIAL HISTORY:    [ X] Non-smoker  [ ] Smoker  [ ] Alcohol      REVIEW OF SYSTEMS:  [ ]chest pain  [  ]shortness of breath  [  ]palpitations  [  ]syncope  [ ]near syncope [ ]upper extremity weakness   [ ] lower extremity weakness  [  ]diplopia  [  ]altered mental status   [  ]fevers  [ ]chills [ ]nausea  [ ]vomitting  [  ]dysphagia    [ ]abdominal pain  [ ]melena  [ ]BRBPR    [  ]epistaxis  [  ]rash    [ ]lower extremity edema        [X] All others negative	  [ ] Unable to obtain      LABS:	 	    CARDIAC MARKERS:        Troponin I, High Sensitivity Result: 6.8 ng/L (01-04-24 @ 14:01)                            8.9    45.53 )-----------( 385      ( 05 Jan 2024 06:00 )             29.6     Hb Trend: 8.9<--, 9.4<--    01-05    139  |  106  |  12  ----------------------------<  78  3.7   |  26  |  0.79    Ca    10.2      05 Jan 2024 06:00  Phos  3.0     01-05  Mg     1.9     01-05    TPro  5.8<L>  /  Alb  1.6<L>  /  TBili  0.6  /  DBili  0.3  /  AST  16  /  ALT  18  /  AlkPhos  144<H>  01-05    Creatinine Trend: 0.79<--, 0.95<--        PHYSICAL EXAM:  T(C): 36.4 (01-05-24 @ 07:44), Max: 36.9 (01-04-24 @ 13:25)  HR: 93 (01-05-24 @ 07:44) (87 - 100)  BP: 134/63 (01-05-24 @ 07:44) (106/68 - 150/78)  RR: 18 (01-05-24 @ 07:44) (17 - 18)  SpO2: 96% (01-05-24 @ 07:44) (96% - 97%)  Wt(kg): --   BMI (kg/m2): 26.6 (01-04-24 @ 13:25)  I&O's Summary      HEENT:  (-)icterus (-)pallor  CV: N S1 S2 1/6 DANNY (+)2 Pulses B/l  Resp:  Clear to ausculatation B/L, normal effort  GI: (+) BS Soft, NT, ND  Lymph:  (-)Edema, (-)obvious lymphadenopathy  Skin: Warm to touch, Normal turgor  Psych: Appropriate mood and affect      ECG:  	Wondering atrial Pacemaker 89 BPM, RBBB    RADIOLOGY:         CXR:   No fracture. Quite advanced left knee degeneration. Stable   left breast mass which on outside CAT scan was largely fatty in nature.    Presently there are small scattered lung infiltrates.        ASSESSMENT/PLAN: 	83y Male  PMHx of BPH w/ urinary retention, stroke (2013), HTN, DM, afib not on ac, dementia, erectile dysfunction, vertigo, leukocytosis of unknown origin (currently being worked up at [facility] Dr. 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    # Abnormal EKG  - Appear to have wondering atrial pacemaker  - no Afib thus far    # Fall  - No evidence of LOC    # Leukocytosis  - Heme/onc f/u    I once again thank you for allowing me to participate in the care of your patient.  If you have any questions or concerns please do not hesitate to contact me.    Fredi Wilson MD, [facility]  BEEPER [phone]       C A R D I O L O G Y  *********************    DATE OF SERVICE: 01-05-24    HISTORY OF PRESENT ILLNESS:   Pt is a 83 y.o M w/ PMHx of BPH w/ urinary retention, stroke (2013), HTN, DM, afib not on ac, dementia, erectile dysfunction, vertigo, leukocytosis of unknown origin (currently being worked up at [facility] Dr. 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 Wife in the HCP and is at bedside assisting in history taking. This was purely a mechanical fall as per patient and family, denies LOC, hitting head. States that he has vertigo and balance issues, but he has also been very weak, especially in the past three months. Pt endorses losing 60 pounds in the past 1.5 years, and wife endorses that he has had decreased appetite for the past 3 months. Pt also has intermittent night sweats. Pt and wife have noticed a right thigh mass, that they saw surgical oncologist Dr. Pugh for out-patient, and in order to do a biopsy on the mass, pt need cardiac clearance (which is why they were going to the cardiologist this morning). According to the wife, pt has also been seeing Dr. Domingo for his BPH and questionable prostate cancer. Denies dizziness currently, chest pain, sob, palpitations, fevers, chills, sick contacts, recent travel , urinary symptoms.     [facility] w/u from paperwork brought in by wife:  1. 11/2023 MRI Prostate- 2.1 x 1.4 cm lesion in right anterior base to mid gland transition zone suspicious for prostate ca.   2. 11/2023 CT chest ab/p/soft tissues- complex 14 cm mass of the medial right upper thigh in the level of the gracilis muscle concerning for  malignant soft tissue neoplasm, pulmonary nodules w/ emphysema, complex 0.9 cm left renal lesion possibly neoplasm   3. 10/2023 MRI Brain non con - mild to mod cerebral atrophy and chronic microvascular ischemic changes in the white matter. Chronic right cerebellar infarct  (04 Jan 2024 18:35)      PAST MEDICAL & SURGICAL HISTORY:  BPH with elevated PSA  History of stroke  HTN (hypertension)  Vertigo  Afib      MEDICATIONS:  MEDICATIONS  (STANDING):  aspirin  chewable 81 milliGRAM(s) Oral daily  donepezil 5 milliGRAM(s) Oral at bedtime  sodium chloride 0.9%. 1000 milliLiter(s) (100 mL/Hr) IV Continuous <Continuous>  tamsulosin 0.4 milliGRAM(s) Oral at bedtime      Allergies    Seafood (Unknown)  No Known Drug Allergies    Intolerances        FAMILY HISTORY:    Non-contributary for premature coronary disease or sudden cardiac death    SOCIAL HISTORY:    [ X] Non-smoker  [ ] Smoker  [ ] Alcohol      REVIEW OF SYSTEMS:  [ ]chest pain  [  ]shortness of breath  [  ]palpitations  [  ]syncope  [ ]near syncope [ ]upper extremity weakness   [ ] lower extremity weakness  [  ]diplopia  [  ]altered mental status   [  ]fevers  [ ]chills [ ]nausea  [ ]vomitting  [  ]dysphagia    [ ]abdominal pain  [ ]melena  [ ]BRBPR    [  ]epistaxis  [  ]rash    [ ]lower extremity edema        [X] All others negative	  [ ] Unable to obtain      LABS:	 	    CARDIAC MARKERS:        Troponin I, High Sensitivity Result: 6.8 ng/L (01-04-24 @ 14:01)                            8.9    45.53 )-----------( 385      ( 05 Jan 2024 06:00 )             29.6     Hb Trend: 8.9<--, 9.4<--    01-05    139  |  106  |  12  ----------------------------<  78  3.7   |  26  |  0.79    Ca    10.2      05 Jan 2024 06:00  Phos  3.0     01-05  Mg     1.9     01-05    TPro  5.8<L>  /  Alb  1.6<L>  /  TBili  0.6  /  DBili  0.3  /  AST  16  /  ALT  18  /  AlkPhos  144<H>  01-05    Creatinine Trend: 0.79<--, 0.95<--        PHYSICAL EXAM:  T(C): 36.4 (01-05-24 @ 07:44), Max: 36.9 (01-04-24 @ 13:25)  HR: 93 (01-05-24 @ 07:44) (87 - 100)  BP: 134/63 (01-05-24 @ 07:44) (106/68 - 150/78)  RR: 18 (01-05-24 @ 07:44) (17 - 18)  SpO2: 96% (01-05-24 @ 07:44) (96% - 97%)  Wt(kg): --   BMI (kg/m2): 26.6 (01-04-24 @ 13:25)  I&O's Summary      HEENT:  (-)icterus (-)pallor  CV: N S1 S2 1/6 DANNY (+)2 Pulses B/l  Resp:  Clear to ausculatation B/L, normal effort  GI: (+) BS Soft, NT, ND  Lymph:  (-)Edema, (-)obvious lymphadenopathy  Skin: Warm to touch, Normal turgor  Psych: Appropriate mood and affect      ECG:  	Wondering atrial Pacemaker 89 BPM, RBBB    RADIOLOGY:         CXR:   No fracture. Quite advanced left knee degeneration. Stable   left breast mass which on outside CAT scan was largely fatty in nature.    Presently there are small scattered lung infiltrates.        ASSESSMENT/PLAN: 	83y Male  PMHx of BPH w/ urinary retention, stroke (2013), HTN, DM, afib not on ac, dementia, erectile dysfunction, vertigo, leukocytosis of unknown origin (currently being worked up at [facility] Dr. 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    # Abnormal EKG  - Appear to have wondering atrial pacemaker  - no Afib thus far    # Fall  - No evidence of LOC    # Leukocytosis  - Heme/onc f/u    I once again thank you for allowing me to participate in the care of your patient.  If you have any questions or concerns please do not hesitate to contact me.    Fredi Wilson MD, [facility]  BEEPER [phone]

## 2024-01-05 NOTE — PHYSICAL THERAPY INITIAL EVALUATION ADULT - DIAGNOSIS, PT EVAL
Patient was sedated and slightly agitated throughout session. Patient was unable to perform any OOB activities. Patient was confused and slightly agitated throughout session. Patient was unable to perform any OOB activities. Patient was confused and slightly agitated throughout session. Patient was only able to perform bed mobility and sit-to-stand/stand-to-sit transfer.

## 2024-01-05 NOTE — CONSULT NOTE ADULT - SUBJECTIVE AND OBJECTIVE BOX
SURGICAL ONCOLOGY CONSULTATION NOTE    S: Patient is a 83M for whom surgical oncology is consulted for R thigh mass. Per pt's wife at bedside, pt has become increasingly confused over past several months and was recently started on Donepezil by PMD - given confusion all history is per wife at bedside. Wife states pt has had significant unintentional weight loss for the past 1.5 years w/associated night sweats and chills - she states she noticed the R thigh mass in 9/2023 after he had lost some weight. Patient was previously seen by surgical oncology Dr. Birmingham who recommended excisional bx, however, pt's PMD recommend cardiology clearance/risk stratification given PMH Afib w/new EKG changes on routine visit. Patient was travelling to [place] OP [place] when he fell causing his presentation to ED yesterday. Of note, pt has had chronic leukocytosis which was noticed several months ago on routine screening and he is currently being w/u'ed by heme/onc Dr. Pugh - bone marrow bx negative for leukemia per wife. Of note, pt also w/history of prostate mass which appears malignant on imaging but has not yet been bx'ed.    Allergies: NKDA  PMH: HTN, CVA 2013, ?dementia (no formal dx, recently started on donepezil), Afib (not on AC per pt preference), chronic leukocytosis  PSH: None    O:  Vital Signs Last 24 Hrs  T(C): 36.5 (05 Jan 2024 12:45), Max: 36.9 (05 Jan 2024 01:56)  T(F): 97.7 (05 Jan 2024 12:45), Max: 98.5 (05 Jan 2024 01:56)  HR: 105 (05 Jan 2024 12:45) (87 - 105)  BP: 131/63 (05 Jan 2024 12:45) (106/68 - 150/78)  BP(mean): --  RR: 18 (05 Jan 2024 12:45) (18 - 18)  SpO2: 97% (05 Jan 2024 12:45) (96% - 97%)    Parameters below as of 05 Jan 2024 12:45  Patient On (Oxygen Delivery Method): room air    Physical Examination:  Gen: Awake, alert, oriented x0 and not cooperative w/examination or questioning  Pulm: Normal work of breathing on room air  Abd: Soft, non-distended, non-tender. No guarding, rigidity, or rebound  Ext: R medial superior thigh w/~10cm x 10cm soft, mobile soft tissue mass w/o overlying skin changes, no open wounds, no ulceration, no TTP. Distal pulses intact and strength/sensation 5/5 and symmetric    Per documentation, CT C/A/P from OSH 11/2023: Complex 14cm mass medial to R thigh, pulmonary nodules w/emphysematous changes, L renal mass possibly representing malignancy                          8.9    45.53 )-----------( 385      ( 05 Jan 2024 06:00 )             29.6   01-05    139  |  106  |  12  ----------------------------<  78  3.7   |  26  |  0.79    Ca    10.2      05 Jan 2024 06:00  Phos  3.0     01-05  Mg     1.9     01-05    TPro  5.8<L>  /  Alb  1.6<L>  /  TBili  0.6  /  DBili  0.3  /  AST  16  /  ALT  18  /  AlkPhos  144<H>  01-05    A: 83M w/R thigh soft tissue mass    P:  - Case to be d/w surg onc attending Dr. Sage, plan to follow SURGICAL ONCOLOGY CONSULTATION NOTE    S: Patient is a 83M for whom surgical oncology is consulted for R thigh mass. Per pt's wife at bedside, pt has become increasingly confused over past several months and was recently started on Donepezil by PMD - given confusion all history is per wife at bedside. Wife states pt has had significant unintentional weight loss for the past 1.5 years w/associated night sweats and chills - she states she noticed the R thigh mass in 9/2023 after he had lost some weight. Patient was previously seen by surgical oncology Dr. Birmingham who recommended excisional bx, however, pt's PMD recommend cardiology clearance/risk stratification given PMH Afib w/new EKG changes on routine visit. [place] was [place] to [place] when he fell causing his presentation to ED yesterday. Of note, pt has had chronic leukocytosis which was noticed several months ago on routine screening and he is currently being w/u'ed by heme/onc Dr. Pugh - bone marrow bx negative for leukemia per wife. Of note, pt also w/history of prostate mass which appears malignant on imaging but has not yet been bx'ed.    Allergies: NKDA  PMH: HTN, CVA 2013, ?dementia (no formal dx, recently started on donepezil), Afib (not on AC per pt preference), chronic leukocytosis  PSH: None    O:  Vital Signs Last 24 Hrs  T(C): 36.5 (05 Jan 2024 12:45), Max: 36.9 (05 Jan 2024 01:56)  T(F): 97.7 (05 Jan 2024 12:45), Max: 98.5 (05 Jan 2024 01:56)  HR: 105 (05 Jan 2024 12:45) (87 - 105)  BP: 131/63 (05 Jan 2024 12:45) (106/68 - 150/78)  BP(mean): --  RR: 18 (05 Jan 2024 12:45) (18 - 18)  SpO2: 97% (05 Jan 2024 12:45) (96% - 97%)    Parameters below as of 05 Jan 2024 12:45  Patient On (Oxygen Delivery Method): room air    Physical Examination:  Gen: Awake, alert, oriented x0 and not cooperative w/examination or questioning  Pulm: Normal work of breathing on room air  Abd: Soft, non-distended, non-tender. No guarding, rigidity, or rebound  Ext: R medial superior thigh w/~10cm x 10cm soft, mobile soft tissue mass w/o overlying skin changes, no open wounds, no ulceration, no TTP. Distal pulses intact and strength/sensation 5/5 and symmetric    Per documentation, CT C/A/P from OSH 11/2023: Complex 14cm mass medial to R thigh, pulmonary nodules w/emphysematous changes, L renal mass possibly representing malignancy                          8.9    45.53 )-----------( 385      ( 05 Jan 2024 06:00 )             29.6   01-05    139  |  106  |  12  ----------------------------<  78  3.7   |  26  |  0.79    Ca    10.2      05 Jan 2024 06:00  Phos  3.0     01-05  Mg     1.9     01-05    TPro  5.8<L>  /  Alb  1.6<L>  /  TBili  0.6  /  DBili  0.3  /  AST  16  /  ALT  18  /  AlkPhos  144<H>  01-05    A: 83M w/R thigh soft tissue mass    P:  - Plan for excisional bx of R leg mass tentatively planned for this admission  - Recommend heme/onc and urology f/u given chronic leukocytosis and prostate mass suspicious for malignancy  - Please document medical clearance/risk stratification for R leg excisional bx  - Please obtain cardiology evaluation for pre-op clearance/risk stratification for R leg excisional bx  - Surgical oncology to follow, please page w/any further concerns

## 2024-01-05 NOTE — PROGRESS NOTE ADULT - ASSESSMENT
Delirium, possible hospital onset delirium; ruled out infection, CT head neg, electrolytes stable   Hypercalcemia of malignancy improving  Leukocytosis, possible due to malignancy, ruled out leukemia with BM biopsy   Atrial fibrillation not on AC  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   Enlarged prostate, concern for malignancy   Renal complex mass  Lung nodules suspicious of malignancy   Emphysema   CKD  HTN  HLD    Plan:   Required one dose of Zyprexa this morning; ruled out UTI, no other focus of infection found   Avoid anticholinergics, benzodiazepines, limit lines/tubes/tethers/restraints, encourage frequent reorientation/reassurance by staff, encourage good sleep hygiene, adequate lighting  Calcium improved  PTH appropriately low   Repeat BMP  not on any meds for afib, EKG on admission shows sinus rhythm with PAC  TTE pending   Cardiology consult appreciated   Oncology consult Dr. Mosqueda (patient follows Dr. Pugh, [facility]) placed   Surgical oncology consult pending   Urology consult pending   cont home meds for HTN   Full code  Plan of care was discussed with NP     Delirium, possible hospital onset delirium; ruled out infection, CT head neg, electrolytes stable   Hypercalcemia of malignancy improving  Leukocytosis, possible due to malignancy, ruled out leukemia with BM biopsy   Atrial fibrillation not on AC  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   Enlarged prostate, concern for malignancy   Renal complex mass  Lung nodules suspicious of malignancy   Emphysema   CKD  HTN  HLD    Plan:   Required one dose of Zyprexa this morning; ruled out UTI, no other focus of infection found   Avoid anticholinergics, benzodiazepines, limit lines/tubes/tethers/restraints, encourage frequent reorientation/reassurance by staff, encourage good sleep hygiene, adequate lighting  Calcium improved  PTH appropriately low   Repeat BMP  not on any meds for afib, EKG on admission shows sinus rhythm with PAC  TTE pending   Cardiology consult appreciated   Oncology consult Dr. Mosqueda (patient follows Dr. Pugh, [facility]) placed   Surgical oncology consult pending   Urology consult pending   cont home meds for HTN   Full code  Plan of care was discussed with NP

## 2024-01-05 NOTE — PHYSICAL THERAPY INITIAL EVALUATION ADULT - PERTINENT HX OF CURRENT PROBLEM, REHAB EVAL
Orders printed and faxed.  
[facility]    Phone Message    May a detailed message be left on voicemail: yes    Reason for Call: Order(s): Other:   Reason for requested: Cardiac rehab   Date needed: As soon as possible   Provider name: Tiffanie     Please fax orders to [phone] [facility].       Action Taken: Message routed to:  [facility] ([facility] pankaj cardio    
Patient is an 82 yo male admitted to ED for mechanical fall. Patient has PMH of BPH w/ urinary retention, A-fib, dementia, vertigo, and CVA. 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 Imaging was negative for any acute changes.

## 2024-01-06 NOTE — PROGRESS NOTE ADULT - SUBJECTIVE AND OBJECTIVE BOX
Patient examined at bedside, resting peacefully. No complaints during examination, PCA at bedside endorsed patient had no acute events during the morning. Afebrile.    Vital Signs Last 24 Hrs  T(C): 36.8 (06 Jan 2024 05:17), Max: 36.8 (05 Jan 2024 15:06)  T(F): 98.2 (06 Jan 2024 05:17), Max: 98.2 (05 Jan 2024 15:06)  HR: 85 (06 Jan 2024 05:17) (85 - 109)  BP: 113/51 (06 Jan 2024 05:17) (109/59 - 134/71)  BP(mean): --  RR: 18 (06 Jan 2024 05:17) (18 - 18)  SpO2: 94% (06 Jan 2024 05:17) (90% - 101%)    Parameters below as of 06 Jan 2024 05:17  Patient On (Oxygen Delivery Method): room air    Gen: Tendance to sleep, not in acute distress  Pulm: Normal work of breathing on room air  Abd: Soft, non-distended, non-tender. No guarding, rigidity, or rebound  Ext: R medial superior thigh w/~10cm x 10cm soft, mobile soft tissue mass w/o overlying skin changes, no open wounds, no ulceration, no TTP. Distal pulses intact and strength/sensation 5/5 and symmetric  : [place] catheter in place, draining clear yellow urine

## 2024-01-06 NOTE — PROGRESS NOTE ADULT - NS ATTEND AMEND GEN_ALL_CORE FT
resting comfortably.  echo results with normal LV systolic function, but some pulmonary hypertension.  without unstable angina or decompensated CHF symptoms, he is optimized for the OR this week for thigh mass excision.  he was not previously anticoagulated for AFib.

## 2024-01-06 NOTE — PROGRESS NOTE ADULT - SUBJECTIVE AND OBJECTIVE BOX
Patient is a 83y old  Male who presents with a chief complaint of frequent falls, weakness (06 Jan 2024 11:57)    Patient was seen and examined at bedside  Wife at bedside, reports seems more oriented today, has his hearing aids today, answers simple questions  Wife is also concerned that he has been showing signs of dementia at home like forgetfulness and is afraid that it is worsening in hospital    INTERVAL HPI/OVERNIGHT EVENTS:  T(C): 36.4 (01-06-24 @ 11:50), Max: 36.8 (01-05-24 @ 15:06)  HR: 83 (01-06-24 @ 11:50) (83 - 109)  BP: 108/52 (01-06-24 @ 11:50) (108/52 - 134/71)  RR: 17 (01-06-24 @ 11:50) (17 - 18)  SpO2: 94% (01-06-24 @ 11:50) (90% - 101%)  Wt(kg): --  I&O's Summary      REVIEW OF SYSTEMS: does not answer all the questions, denies any pain     MEDICATIONS  (STANDING):  aspirin  chewable 81 milliGRAM(s) Oral daily  donepezil 5 milliGRAM(s) Oral at bedtime  sodium chloride 0.9%. 1000 milliLiter(s) (100 mL/Hr) IV Continuous <Continuous>  tamsulosin 0.4 milliGRAM(s) Oral at bedtime    MEDICATIONS  (PRN):      PHYSICAL EXAM:  GENERAL: NAD  HEAD:  Atraumatic, Normocephalic  NERVOUS SYSTEM:  Alert & Oriented X2, no apparent focal deficit   CHEST/LUNG: Clear to auscultation bilaterally; No rales, rhonchi, wheezing, or rubs  HEART: Regular rate and rhythm; No murmurs, rubs, or gallops  ABDOMEN: Soft, Nontender, Nondistended; Bowel sounds present  EXTREMITIES:  right thigh mass same as yesterday, 2+ Peripheral Pulses, No clubbing, cyanosis, or edema  SKIN: No rashes or lesions  Has condom catheter placed now       LABS:                        8.6    50.24 )-----------( 357      ( 06 Jan 2024 07:35 )             28.5     01-06    141  |  105  |  14  ----------------------------<  73  3.5   |  25  |  0.81    Ca    10.4      06 Jan 2024 07:35  Phos  3.3     01-06  Mg     1.8     01-06    TPro  5.3<L>  /  Alb  1.4<L>  /  TBili  0.8  /  DBili  x   /  AST  11  /  ALT  15  /  AlkPhos  127<H>  01-06    PT/INR - ( 04 Jan 2024 14:01 )   PT: 16.8 sec;   INR: 1.49 ratio         PTT - ( 04 Jan 2024 14:01 )  PTT:40.3 sec  Urinalysis Basic - ( 06 Jan 2024 07:35 )    Color: x / Appearance: x / SG: x / pH: x  Gluc: 73 mg/dL / Ketone: x  / Bili: x / Urobili: x   Blood: x / Protein: x / Nitrite: x   Leuk Esterase: x / RBC: x / WBC x   Sq Epi: x / Non Sq Epi: x / Bacteria: x      CAPILLARY BLOOD GLUCOSE      < from: Xray Chest 1 View AP/PA (01.04.24 @ 17:30) >  No fracture. Quite advanced left knee degeneration. Stable   left breast mass which on outside CAT scan was largely fatty in nature.    Presently there are small scattered lung infiltrates.    < end of copied text >

## 2024-01-06 NOTE — PROGRESS NOTE ADULT - ASSESSMENT
Assessment   83M w/R thigh soft tissue mass. Stable, afebrile.    P:  - Plan for excisional bx of R leg mass tentatively planned for this admission  - Recommend heme/onc and urology f/u given chronic leukocytosis and prostate mass suspicious for malignancy  - Please document medical clearance/risk stratification for R leg excisional bx  - Please obtain cardiology evaluation for pre-op clearance/risk stratification for R leg excisional bx  - Surgical oncology to follow, please page w/any further concerns

## 2024-01-06 NOTE — PROGRESS NOTE ADULT - SUBJECTIVE AND OBJECTIVE BOX
pt seen and examined, no complaints, ROS - .       aspirin  chewable 81 milliGRAM(s) Oral daily  donepezil 5 milliGRAM(s) Oral at bedtime  sodium chloride 0.9%. 1000 milliLiter(s) IV Continuous <Continuous>  tamsulosin 0.4 milliGRAM(s) Oral at bedtime                            8.9    45.53 )-----------( 385      ( 05 Jan 2024 06:00 )             29.6       Hemoglobin: 8.9 g/dL (01-05 @ 06:00)  Hemoglobin: 9.4 g/dL (01-04 @ 14:01)      01-05    139  |  106  |  12  ----------------------------<  78  3.7   |  26  |  0.79    Ca    10.2      05 Jan 2024 06:00  Phos  3.0     01-05  Mg     1.9     01-05    TPro  5.8<L>  /  Alb  1.6<L>  /  TBili  0.6  /  DBili  0.3  /  AST  16  /  ALT  18  /  AlkPhos  144<H>  01-05    Creatinine Trend: 0.79<--, 0.95<--    COAGS:           T(C): 36.8 (01-06-24 @ 05:17), Max: 36.8 (01-05-24 @ 15:06)  HR: 85 (01-06-24 @ 05:17) (85 - 109)  BP: 113/51 (01-06-24 @ 05:17) (109/59 - 134/71)  RR: 18 (01-06-24 @ 05:17) (18 - 18)  SpO2: 94% (01-06-24 @ 05:17) (90% - 101%)  Wt(kg): --    I&O's Summary    HEENT:  (-)icterus (-)pallor  CV: N S1 S2 1/6 DANNY (+)2 Pulses B/l  Resp:  Clear to ausculatation B/L, normal effort  GI: (+) BS Soft, NT, ND  Lymph:  (-)Edema, (-)obvious lymphadenopathy  Skin: Warm to touch, Normal turgor  Psych: Appropriate mood and affect    ASSESSMENT/PLAN: 	83y Male  PMHx of BPH w/ urinary retention, stroke (2013), HTN, DM, afib not on ac, dementia, erectile dysfunction, vertigo, leukocytosis of unknown origin (currently being worked up at [facility] Dr. 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    # Abnormal EKG  - Appear to have wondering atrial pacemaker  - tachy overnight, no complaints of palpitation     # Fall  - No evidence of LOC    # Leukocytosis  - Heme/onc f/u   pt seen and examined, no complaints, ROS - .       aspirin  chewable 81 milliGRAM(s) Oral daily  donepezil 5 milliGRAM(s) Oral at bedtime  sodium chloride 0.9%. 1000 milliLiter(s) IV Continuous <Continuous>  tamsulosin 0.4 milliGRAM(s) Oral at bedtime                            8.9    45.53 )-----------( 385      ( 05 Jan 2024 06:00 )             29.6       Hemoglobin: 8.9 g/dL (01-05 @ 06:00)  Hemoglobin: 9.4 g/dL (01-04 @ 14:01)      01-05    139  |  106  |  12  ----------------------------<  78  3.7   |  26  |  0.79    Ca    10.2      05 Jan 2024 06:00  Phos  3.0     01-05  Mg     1.9     01-05    TPro  5.8<L>  /  Alb  1.6<L>  /  TBili  0.6  /  DBili  0.3  /  AST  16  /  ALT  18  /  AlkPhos  144<H>  01-05    Creatinine Trend: 0.79<--, 0.95<--    COAGS:           T(C): 36.8 (01-06-24 @ 05:17), Max: 36.8 (01-05-24 @ 15:06)  HR: 85 (01-06-24 @ 05:17) (85 - 109)  BP: 113/51 (01-06-24 @ 05:17) (109/59 - 134/71)  RR: 18 (01-06-24 @ 05:17) (18 - 18)  SpO2: 94% (01-06-24 @ 05:17) (90% - 101%)  Wt(kg): --    I&O's Summary    HEENT:  (-)icterus (-)pallor  CV: N S1 S2 1/6 DANNY (+)2 Pulses B/l  Resp:  Clear to ausculatation B/L, normal effort  GI: (+) BS Soft, NT, ND  Lymph:  (-)Edema, (-)obvious lymphadenopathy  Skin: Warm to touch, Normal turgor  Psych: Appropriate mood and affect    ASSESSMENT/PLAN: 	83y Male  PMHx of BPH w/ urinary retention, stroke (2013), HTN, DM, afib not on ac, dementia, erectile dysfunction, vertigo, leukocytosis of unknown origin (currently being worked up at [facility] Dr. 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    # Abnormal EKG  - Appear to have wondering atrial pacemaker  - tachy overnight, no complaints of palpitation     # Fall  - No evidence of LOC    # Leukocytosis  - Heme/onc f/u

## 2024-01-06 NOTE — PROGRESS NOTE ADULT - ASSESSMENT
Delirium, possible hospital onset delirium; unlikely infection, CT head neg, electrolytes stable   Hypercalcemia of malignancy improving  Leukocytosis, possible due to malignancy, ruled out leukemia with BM biopsy   Atrial fibrillation not on AC  CVA, cerebellar infarct   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   Enlarged prostate, concern for malignancy   Renal complex mass  Lung nodules suspicious of malignancy   Emphysema   CKD  HTN  HLD    Plan:   Mental status improved today. Avoid anticholinergics, benzodiazepines, limit lines/tubes/tethers/restraints, encourage frequent reorientation/reassurance by staff, encourage good sleep hygiene, adequate lighting  Calcium improved, rest of the electrolytes stable   Wife concerned that patient has dementia and it is worsening. Given that discussed about the plan of surgical excision of mass. As procedure will be high risk due to his baseline dementia, advanced age and even if the mass is taken out there are concerns of malignancies in other organs. She understands there is high risk of perioperative complications, including delirium, worsening mental status, and MACE. Still wants to pursue surgical excision as the mass is uncomfortable for patient. She is willing to speak with palliative care team  Will obtain CT chest, abd and pelvis with contrast for better staging of malignancies, as recommended by oncology. patient had multiple MRIs in November  RCRI score 2, 10% risk of MACE. patient is moderate to high risk for high risk procedure. Medically optimized   Will order the CT scans for tomorrow since patient is a little more oriented today and moving him again may worsen it (ER>ER hold> 4N)  PTH appropriately low   Daily CBC and  BMP  Wife concerned about poor PO intake, started on gentle hydration with D5W  not on any meds for afib, EKG on admission shows sinus rhythm with PAC  TTE noted  Cardiology consult appreciated, cardiac risk stratification appreciated   Surgical oncology consult appreciated   Urology consult appreciated  cont home meds for HTN   Full code  Plan of care was discussed with NP

## 2024-01-07 NOTE — PROGRESS NOTE ADULT - SUBJECTIVE AND OBJECTIVE BOX
HPI:  Patient is a 83y old  Male who presents with a chief complaint of frequent falls, weakness (07 Jan 2024 10:13)    Patient was seen and examined with wife at bedside  Complains of pain in the leg   At his baseline mental status       INTERVAL HPI/OVERNIGHT EVENTS:  T(C): 36.6 (01-07-24 @ 05:48), Max: 36.8 (01-06-24 @ 20:13)  HR: 82 (01-07-24 @ 05:48) (82 - 88)  BP: 108/48 (01-07-24 @ 05:48) (108/48 - 116/66)  RR: 18 (01-07-24 @ 05:48) (18 - 18)  SpO2: 94% (01-07-24 @ 05:48) (94% - 94%)  Wt(kg): --  I&O's Summary    06 Jan 2024 07:01  -  07 Jan 2024 07:00  --------------------------------------------------------  IN: 0 mL / OUT: 325 mL / NET: -325 mL        REVIEW OF SYSTEMS: denies fever, chills, SOB, palpitations, chest pain, abdominal pain, nausea, vomiting, diarrhea, constipation, dizziness    MEDICATIONS  (STANDING):  aspirin  chewable 81 milliGRAM(s) Oral daily  dextrose 5%. 1000 milliLiter(s) (80 mL/Hr) IV Continuous <Continuous>  donepezil 5 milliGRAM(s) Oral at bedtime  melatonin 3 milliGRAM(s) Oral at bedtime  sodium chloride 0.9%. 1000 milliLiter(s) (100 mL/Hr) IV Continuous <Continuous>  tamsulosin 0.4 milliGRAM(s) Oral at bedtime    MEDICATIONS  (PRN):      PHYSICAL EXAM:  GENERAL: NAD  HEAD:  Atraumatic, Normocephalic  NERVOUS SYSTEM:  Alert & Oriented X2, no apparent new focal deficit   CHEST/LUNG: Clear to auscultation bilaterally; No rales, rhonchi, wheezing, or rubs  HEART: Regular rate and rhythm; No murmurs, rubs, or gallops  ABDOMEN: Soft, Nontender, Nondistended; Bowel sounds present  EXTREMITIES:  right thigh mass same as yesterday, 2+ Peripheral Pulses, No clubbing, cyanosis, or edema  SKIN: No rashes or lesions  Condom catheter draining dark concentrated urine         LABS:                        8.4    48.41 )-----------( 356      ( 07 Jan 2024 07:25 )             27.2     01-06    141  |  105  |  14  ----------------------------<  73  3.5   |  25  |  0.81    Ca    10.4      06 Jan 2024 07:35  Phos  3.3     01-06  Mg     1.8     01-06    TPro  5.3<L>  /  Alb  1.4<L>  /  TBili  0.8  /  DBili  x   /  AST  11  /  ALT  15  /  AlkPhos  127<H>  01-06      Urinalysis Basic - ( 06 Jan 2024 07:35 )    Color: x / Appearance: x / SG: x / pH: x  Gluc: 73 mg/dL / Ketone: x  / Bili: x / Urobili: x   Blood: x / Protein: x / Nitrite: x   Leuk Esterase: x / RBC: x / WBC x   Sq Epi: x / Non Sq Epi: x / Bacteria: x      CAPILLARY BLOOD GLUCOSE       HPI:  Patient is a 83y old  Male who presents with a chief complaint of frequent falls, weakness (07 Jan 2024 10:13)    Patient was seen and examined with wife at bedside  Complains of pain in the leg   At his baseline mental status       INTERVAL HPI/OVERNIGHT EVENTS:  T(C): 36.6 (01-07-24 @ 05:48), Max: 36.8 (01-06-24 @ 20:13)  HR: 82 (01-07-24 @ 05:48) (82 - 88)  BP: 108/48 (01-07-24 @ 05:48) (108/48 - 116/66)  RR: 18 (01-07-24 @ 05:48) (18 - 18)  SpO2: 94% (01-07-24 @ 05:48) (94% - 94%)  Wt(kg): --  I&O's Summary    06 Jan 2024 07:01  -  07 Jan 2024 07:00  --------------------------------------------------------  IN: 0 mL / OUT: 325 mL / NET: -325 mL        REVIEW OF SYSTEMS: denies fever, chills, SOB, palpitations, chest pain, abdominal pain, nausea, vomiting, diarrhea, constipation, dizziness    MEDICATIONS  (STANDING):  aspirin  chewable 81 milliGRAM(s) Oral daily  dextrose 5%. 1000 milliLiter(s) (80 mL/Hr) IV Continuous <Continuous>  donepezil 5 milliGRAM(s) Oral at bedtime  melatonin 3 milliGRAM(s) Oral at bedtime  sodium chloride 0.9%. 1000 milliLiter(s) (100 mL/Hr) IV Continuous <Continuous>  tamsulosin 0.4 milliGRAM(s) Oral at bedtime    MEDICATIONS  (PRN):      PHYSICAL EXAM:  GENERAL: NAD  HEAD:  Atraumatic, Normocephalic  NERVOUS SYSTEM:  Alert & Oriented X2, no apparent new focal deficit   CHEST/LUNG: Clear to auscultation bilaterally; No rales, rhonchi, wheezing, or rubs  HEART: Regular rate and rhythm; No murmurs, rubs, or gallops  ABDOMEN: Soft, Nontender, Nondistended; Bowel sounds present  EXTREMITIES:  right thigh mass same as yesterday, 2+ Peripheral Pulses, No clubbing, cyanosis, or edema  SKIN: No rashes or lesions  Condom catheter draining dark concentrated urine         LABS:                        8.4    48.41 )-----------( 356      ( 07 Jan 2024 07:25 )             27.2     01-06    141  |  105  |  14  ----------------------------<  73  3.5   |  25  |  0.81    Ca    10.4      06 Jan 2024 07:35  Phos  3.3     01-06  Mg     1.8     01-06    TPro  5.3<L>  /  Alb  1.4<L>  /  TBili  0.8  /  DBili  x   /  AST  11  /  ALT  15  /  AlkPhos  127<H>  01-06      Urinalysis Basic - ( 06 Jan 2024 07:35 )    Color: x / Appearance: x / SG: x / pH: x  Gluc: 73 mg/dL / Ketone: x  / Bili: x / Urobili: x   Blood: x / Protein: x / Nitrite: x   Leuk Esterase: x / RBC: x / WBC x   Sq Epi: x / Non Sq Epi: x / Bacteria: x      CAPILLARY BLOOD GLUCOSE      I reviewed CBC, BMP, and urinalaysis results.   I ordered repeat CBC, BMP and formulated the plan as below    personally reviewed EKG- Sinus rhythm with PACs  personally reviewed CXR- BL patchy infiltrates, unsure if malignancy vs infection  Discussed with wife at bedside

## 2024-01-07 NOTE — DISCHARGE NOTE PROVIDER - HOSPITAL COURSE
83 y.o M w/ PMHx of BPH w/ urinary retention, stroke (2013), HTN, DM, afib not on ac, dementia, erectile dysfunction, vertigo, leukocytosis of unknown origin (currently being worked up at [facility] Dr. 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 Wife in the HCP and is at bedside assisting in history taking. This was purely a mechanical fall as per patient and family, denies LOC, hitting head. States that he has vertigo and balance issues, but he has also been very weak, especially in the past three months. Pt endorses losing 60 pounds in the past 1.5 years, and wife endorses that he has had decreased appetite for the past 3 months. Pt also has intermittent night sweats. Pt and wife have noticed a right thigh mass, that they saw surgical oncologist Dr. Pugh for out-patient, and in order to do a biopsy on the mass, pt need cardiac clearance (which is why they were going to the cardiologist this morning). According to the wife, pt has also been seeing Dr. Domingo for his BPH and questionable prostate cancer.     [facility] w/u from paperwork brought in by wife:  1. 11/2023 MRI Prostate- 2.1 x 1.4 cm lesion in right anterior base to mid gland transition zone suspicious for prostate ca.   2. 11/2023 CT chest ab/p/soft tissues- complex 14 cm mass of the medial right upper thigh in the level of the gracilis muscle concerning for  malignant soft tissue neoplasm, pulmonary nodules w/ emphysema, complex 0.9 cm left renal lesion possibly neoplasm   3. 10/2023 MRI Brain non con - mild to mod cerebral atrophy and chronic microvascular ischemic changes in the white matter. Chronic right cerebellar infarct     Hem/onc consulted, pt was seeing oncologist outpatient and being worked up for thigh lesion. Recommended CT chest/A/P/ with contrast -----  Pt is s/p biopsy of right thigh mass by surgery --------------  Urology was consulted for urinary retention, no acute urologic intervention    PT recs ABRAHAM 83 y.o M w/ PMHx of BPH w/ urinary retention, stroke (2013), HTN, DM, afib not on ac, dementia, erectile dysfunction, vertigo, leukocytosis of unknown origin (currently being worked up at [facility] Dr. 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 Wife in the HCP and is at bedside assisting in history taking. This was purely a mechanical fall as per patient and family, denies LOC, hitting head. States that he has vertigo and balance issues, but he has also been very weak, especially in the past three months. Pt endorses losing 60 pounds in the past 1.5 years, and wife endorses that he has had decreased appetite for the past 3 months. Pt also has intermittent night sweats. Pt and wife have noticed a right thigh mass, that they saw surgical oncologist Dr. Pugh for out-patient, and in order to do a biopsy on the mass, pt need cardiac clearance (which is why they were going to the cardiologist this morning). According to the wife, pt has also been seeing Dr. Domingo for his BPH and questionable prostate cancer.     [facility] w/u from paperwork brought in by wife:  1. 11/2023 MRI Prostate- 2.1 x 1.4 cm lesion in right anterior base to mid gland transition zone suspicious for prostate ca.   2. 11/2023 CT chest ab/p/soft tissues- complex 14 cm mass of the medial right upper thigh in the level of the gracilis muscle concerning for  malignant soft tissue neoplasm, pulmonary nodules w/ emphysema, complex 0.9 cm left renal lesion possibly neoplasm   3. 10/2023 MRI Brain non con - mild to mod cerebral atrophy and chronic microvascular ischemic changes in the white matter. Chronic right cerebellar infarct     Hem/onc consulted, pt was seeing oncologist outpatient and being worked up for thigh lesion. Recommended CT chest/A/P/ with contrast -----  Pt is s/p biopsy of right thigh mass by surgery --------------  Urology was consulted for urinary retention, no acute urologic intervention    PT recs ABRAHAM 83 y.o M w/ PMHx of BPH w/ urinary retention, stroke (2013), HTN, DM, afib not on ac, dementia, erectile dysfunction, vertigo, leukocytosis of unknown origin (currently being worked up at [facility] Dr. 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 Wife in the HCP and is at bedside assisting in history taking. This was purely a mechanical fall as per patient and family, denies LOC, hitting head. States that he has vertigo and balance issues, but he has also been very weak, especially in the past three months. Pt endorses losing 60 pounds in the past 1.5 years, and wife endorses that he has had decreased appetite for the past 3 months. Pt also has intermittent night sweats. Pt and wife have noticed a right thigh mass, that they saw surgical oncologist Dr. Pugh for out-patient, and in order to do a biopsy on the mass, pt need cardiac clearance (which is why they were going to the cardiologist this morning). According to the wife, pt has also been seeing Dr. Domingo for his BPH and questionable prostate cancer.     [facility] w/u from paperwork brought in by wife:  1. 11/2023 MRI Prostate- 2.1 x 1.4 cm lesion in right anterior base to mid gland transition zone suspicious for prostate ca.   2. 11/2023 CT chest ab/p/soft tissues- complex 14 cm mass of the medial right upper thigh in the level of the gracilis muscle concerning for  malignant soft tissue neoplasm, pulmonary nodules w/ emphysema, complex 0.9 cm left renal lesion possibly neoplasm   3. 10/2023 MRI Brain non con - mild to mod cerebral atrophy and chronic microvascular ischemic changes in the white matter. Chronic right cerebellar infarct     Hem/onc consulted, pt was seeing oncologist outpatient and being worked up for thigh lesion. Recommended CT chest/A/P/ with contrast -----  Pt is s/p biopsy of right thigh mass by surgery --------------  Urology was consulted for urinary retention, no acute urologic intervention    PT recs ABRAHAM 83 y.o M w/ PMHx of BPH w/ urinary retention, stroke (2013), HTN, DM, afib not on ac, dementia, erectile dysfunction, vertigo, leukocytosis of unknown origin (currently being worked up at [facility] Dr. 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 Wife in the HCP and is at bedside assisting in history taking. This was purely a mechanical fall as per patient and family, denies LOC, hitting head. States that he has vertigo and balance issues, but he has also been very weak, especially in the past three months. Pt endorses losing 60 pounds in the past 1.5 years, and wife endorses that he has had decreased appetite for the past 3 months. Pt also has intermittent night sweats. Pt and wife have noticed a right thigh mass, that they saw surgical oncologist Dr. Pugh for out-patient, and in order to do a biopsy on the mass, pt need cardiac clearance (which is why they were going to the cardiologist this morning). According to the wife, pt has also been seeing Dr. Domingo for his BPH and questionable prostate cancer.     [facility] w/u from paperwork brought in by wife:  1. 11/2023 MRI Prostate- 2.1 x 1.4 cm lesion in right anterior base to mid gland transition zone suspicious for prostate ca.   2. 11/2023 CT chest ab/p/soft tissues- complex 14 cm mass of the medial right upper thigh in the level of the gracilis muscle concerning for  malignant soft tissue neoplasm, pulmonary nodules w/ emphysema, complex 0.9 cm left renal lesion possibly neoplasm   3. 10/2023 MRI Brain non con - mild to mod cerebral atrophy and chronic microvascular ischemic changes in the white matter. Chronic right cerebellar infarct     Hem/onc consulted, pt was seeing oncologist outpatient and being worked up for thigh lesion. Recommended CT chest/A/P/ with contrast -----  Pt is s/p biopsy of right thigh mass by surgery --------------  Urology was consulted for urinary retention, no acute urologic intervention    PT recs ABRAHAM

## 2024-01-07 NOTE — DIETITIAN INITIAL EVALUATION ADULT - PERTINENT LABORATORY DATA
01-06    141  |  105  |  14  ----------------------------<  73  3.5   |  25  |  0.81    Ca    10.4      06 Jan 2024 07:35  Phos  3.3     01-06  Mg     1.8     01-06    TPro  5.3<L>  /  Alb  1.4<L>  /  TBili  0.8  /  DBili  x   /  AST  11  /  ALT  15  /  AlkPhos  127<H>  01-06

## 2024-01-07 NOTE — PROGRESS NOTE ADULT - ASSESSMENT
Leukocytosis, possible due to malignancy, ruled out leukemia with BM biopsy   Atrial fibrillation not on AC  CVA, cerebellar infarct   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   Enlarged prostate, concern for malignancy   Renal complex mass  Lung nodules suspicious of malignancy   Emphysema   CKD  HTN  HLD  Delirium, possible hospital onset delirium; unlikely infection, CT head neg, electrolytes stable- resolved   Hypercalcemia of malignancy improving  Severe protein calorie malnutrition      Plan:   Leukocytosis possibly for malignancy, will follow up with CT scans for any focus of infection  Follow up CT chest, abd and pelvis with contrast for better staging of malignancies, as recommended by oncology. Patient had multiple MRIs in November  RCRI score 2, 10% risk of MACE. patient is moderate to high risk for high risk procedure. Medically optimized   Mental status improved today. Avoid anticholinergics, benzodiazepines, limit lines/tubes/tethers/restraints, encourage frequent reorientation/reassurance by staff, encourage good sleep hygiene, adequate lighting  Calcium improved, no BMP available today, will cont IVF. Calcium was not responsible for AMS as mental status was at baseline on admission with higher calcium level  PTH appropriately low   Daily CBC and  BMP  Cont gentle hydration with D5W  not on any meds for afib, EKG on admission shows sinus rhythm with PAC  TTE noted  Cardiology consult appreciated, cardiac risk stratification appreciated   Surgical oncology and Urology consult appreciated  cont home meds for HTN   Add Ensure on the diet   Full code  Plan of care was discussed with NP and wife at bedside      Leukocytosis, possible due to malignancy, ruled out leukemia with BM biopsy   Atrial fibrillation not on AC  CVA, cerebellar infarct   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   Enlarged prostate, concern for malignancy   Renal complex mass  Lung nodules suspicious of malignancy   Emphysema   CKD  HTN  HLD  Delirium, possible hospital onset delirium; unlikely infection, CT head neg, electrolytes stable- resolved   Hypercalcemia of malignancy improving  Severe protein calorie malnutrition      Plan:   Leukocytosis possibly for malignancy, will follow up with CT scans for any focus of infection  Follow up CT chest, abd and pelvis with contrast for better staging of malignancies, as recommended by oncology. Patient had multiple MRIs in November  RCRI score 2, 10% risk of MACE. patient is moderate to high risk for high risk procedure. Medically optimized   Mental status improved today. Avoid anticholinergics, benzodiazepines, limit lines/tubes/tethers/restraints, encourage frequent reorientation/reassurance by staff, encourage good sleep hygiene, adequate lighting  Calcium improved, no BMP available today, will cont IVF. Calcium was not responsible for AMS as mental status was at baseline on admission with higher calcium level  PTH appropriately low   Daily CBC and  BMP  Cont gentle hydration with D5W  not on any meds for afib, EKG on admission shows sinus rhythm with PAC  TTE noted  Cardiology consult appreciated, cardiac risk stratification appreciated   Surgical oncology and Urology consult appreciated  cont home meds for HTN   Add Ensure on the diet   Full code  Plan of care was discussed with NP and wife at bedside   Palliative care consult tomorrow

## 2024-01-07 NOTE — DISCHARGE NOTE PROVIDER - DETAILS OF MALNUTRITION DIAGNOSIS/DIAGNOSES
This patient has been assessed with a concern for Malnutrition and was treated during this hospitalization for the following Nutrition diagnosis/diagnoses:     -  01/07/2024: Severe protein-calorie malnutrition

## 2024-01-07 NOTE — DIETITIAN NUTRITION RISK NOTIFICATION - ADDITIONAL COMMENTS/DIETITIAN RECOMMENDATIONS
Malnutrition Diagnosis Parameters: intake <75% needs x >3m, unintended wt loss >10% x 6m, functional loss with debility

## 2024-01-07 NOTE — DIETITIAN INITIAL EVALUATION ADULT - PROBLEM SELECTOR PLAN 5
- BPH w/ elevated PSA and urianry retention  - mckeon placed in the ED   - c/w flomax home med   - urology consult in the AM by primary team

## 2024-01-07 NOTE — DISCHARGE NOTE PROVIDER - PROVIDER TOKENS
PROVIDER:[TOKEN:[[id number]],FOLLOWUP:[Routine]] PROVIDER:[TOKEN:[[id number]],FOLLOWUP:[Routine]] PROVIDER:[TOKEN:[[id number]],FOLLOWUP:[Routine]] PROVIDER:[TOKEN:[[id number]],FOLLOWUP:[Routine]]

## 2024-01-07 NOTE — DIETITIAN INITIAL EVALUATION ADULT - SIGNS/SYMPTOMS
intake <75% needs x >3m, unintended wt loss >10% x 6m intake <75% needs x >3m, unintended wt loss >10% x 6m, functional loss with debility

## 2024-01-07 NOTE — DISCHARGE NOTE PROVIDER - NSDCMRMEDTOKEN_GEN_ALL_CORE_FT
aspirin 81 mg oral tablet: 1 tab(s) orally once a day  donepezil 5 mg oral tablet: 1 tab(s) orally once a day  Flomax 0.4 mg oral capsule: 1 cap(s) orally once a day

## 2024-01-07 NOTE — PROGRESS NOTE ADULT - SUBJECTIVE AND OBJECTIVE BOX
Patient examined at bedside, resting peacefully. No complaints during examination, denied any abdominal pain or RLE pain, PCA at bedside endorsed patient had no acute events during the morning. Afebrile.    Vital Signs Last 24 Hrs  T(C): 36.6 (07 Jan 2024 05:48), Max: 36.8 (06 Jan 2024 20:13)  T(F): 97.9 (07 Jan 2024 05:48), Max: 98.2 (06 Jan 2024 20:13)  HR: 82 (07 Jan 2024 05:48) (82 - 88)  BP: 108/48 (07 Jan 2024 05:48) (108/48 - 116/66)  BP(mean): 64 (06 Jan 2024 11:50) (64 - 64)  RR: 18 (07 Jan 2024 05:48) (17 - 18)  SpO2: 94% (07 Jan 2024 05:48) (94% - 94%)    Parameters below as of 07 Jan 2024 05:48  Patient On (Oxygen Delivery Method): room air    Gen: Tendance to sleep, not in acute distress  Pulm: Normal work of breathing on room air  Abd: Soft, non-distended, non-tender. No guarding, rigidity, or rebound  Ext: R medial superior thigh w/~10cm x 10cm soft, mobile soft tissue mass w/o overlying skin changes, no open wounds, no ulceration, no TTP. Distal pulses intact and strength/sensation 5/5 and symmetric  : [place] catheter in place, draining clear yellow urine

## 2024-01-07 NOTE — DIETITIAN INITIAL EVALUATION ADULT - PROBLEM SELECTOR PLAN 1
- presenting s/p fall this afternoon on the way to his cardiologist. w/ multiple falls in the past few months, and increasing weakness in light of his malignancy workup   - Vitals wnl.   - Labs significant for WC 47, INR 1.49, Albumin 1.7, Ca 11 (corrected 13), AlkP 154.   - CTH shows no changes from CTH in 11/2023 no signs of hemorrhage   - Xray show no fractures.   - CXR shows scattered non-specific pulmonary infiltrates.   - Admitted for ambulatory dysfunction.   - PT consult   - fall risk precautions

## 2024-01-07 NOTE — DIETITIAN INITIAL EVALUATION ADULT - PROBLEM SELECTOR PLAN 6
- known hx of Afib  - EKG in ED showed afib w/ RBBB, pt not on AC (refused blood thinners as per wife), and has been taking asa 325mg qd for many years until he stopped last year   - Stress test 8/2016 wnl   - f/u TTE   - cardiology consult Dr. Wilson  - pt in need of cardiac risk assessment prior to surgical oncology team intervention out-patient

## 2024-01-07 NOTE — DIETITIAN NUTRITION RISK NOTIFICATION - TREATMENT: THE FOLLOWING DIET HAS BEEN RECOMMENDED
Diet, Soft and Bite Sized:   Supplement Feeding Modality:  Oral  Ensure Enlive Cans or Servings Per Day:  1       Frequency:  Two Times a day (01-07-24 @ 10:25) [Pending Verification By Attending]

## 2024-01-07 NOTE — DISCHARGE NOTE PROVIDER - NSDCCPCAREPLAN_GEN_ALL_CORE_FT
PRINCIPAL DISCHARGE DIAGNOSIS  Diagnosis: Malignancy  Assessment and Plan of Treatment: Pt has known thigh lesion from outpatient imaging done 11/2023. You are seeing oncologist and was scheduled for biopsy.  S/P biopsy of right thigh lesion showed ------------  You were seen by palliative -----  Follow up with your oncologist      SECONDARY DISCHARGE DIAGNOSES  Diagnosis: Leukocytosis  Assessment and Plan of Treatment: This is likely due to malignancy. Infectious work up was negative, you were seen by oncologist  --  Bone marrow biopsy performed in December was unremarkable and  No diagnostic abnormalities   - Will monitor for now    Diagnosis: BPH with elevated PSA  Assessment and Plan of Treatment: You were seen by urologist with no acute intervention  You have an enlarged prostate gland which gets bigger as men get older - it is a very common problem and has nothing to do with prostate cancer  Call your doctor if you are urinating more frequently, have trouble starting to urinate, have weak stream, urine leaking or dribbling, and feeling as though bladder is not empty after urination  Your doctor will monitor your prostate with a rectal exam as well as urine or blood testing  You can help yourself by reducing the amount of fluid you drink before going to bed, limiting the amount of alcohol & caffeine you drink   Avoid cold & allergy medication that contain decongestants or antihistamines which make BPH symptoms worse  You can also "double void" by waiting a moment after urinating & trying again  Take your medication as prescribed - one medication helps to relax the muscle around the urethra and the other medication you may take prevents the prostate from growing more or even shrinking the prostate      Diagnosis: Emphysema lung  Assessment and Plan of Treatment:     Diagnosis: Afib  Assessment and Plan of Treatment: You are not on anticoagulant at home  Atrial fibrillation is the most common heart rhythm problem & has the risk of stroke & heart attack  It helps if you control your blood pressure, not drink more than 1-2 alcohol drinks per day, cut down on caffeine, getting treatment for over active thyroid gland, & getting exercise  Call your doctor if you feel your heart racing or beating unusually, chest tightness or pain, lightheaded, faint, shortness of breath especially with exercise  It is important to take your heart medication as prescribed  You may be on anticoagulation which is very important to take as directed - you may need blood work to monitor drug levels

## 2024-01-07 NOTE — DIETITIAN INITIAL EVALUATION ADULT - OTHER INFO
Pt lives home with family PTA, confused, agitated at times, on Enhances Supervision, asleep when visited today; Spoke to spouse Sadie at [phone], well-communicated; Reported decreased appetite x 4 to 5m, worsening since Sept, 2023, wt loss from 200 lb to current 170 lb per wife ( conflict data, see above), intermittent nausea; Allergy to Seafood, no specific food choices/aversions, not on any oral nutritional supplement before, willing to try; on Soft and Bite Sized food at present by MD for better tolerance, 51-75% intake at time per flowsheet Pt lives home with family PTA, confused, agitated at times, on Enhances Supervision, asleep when visited today; Spoke to spouse Sadie at [phone], well-communicated; Reported decreased appetite x 4 to 5m, worsening since Sept, 2023, wt loss from 200 lb to current 170 lb per wife ( conflict data, see above), intermittent nausea; Allergy to Seafood, no specific food choices/aversions, not on any oral nutritional supplement before, willing to try; on Soft and Bite Sized food at present by MD for better tolerance, 51-75% intake at time per flowsheet

## 2024-01-07 NOTE — DISCHARGE NOTE PROVIDER - DISCHARGE SERVICE FOR PATIENT
None on the discharge service for the patient. I have reviewed and made amendments to the documentation where necessary.

## 2024-01-07 NOTE — DIETITIAN INITIAL EVALUATION ADULT - PROBLEM SELECTOR PLAN 7
- hx of vertigo, not on any medication  - no longer experiencing vertigo s/p meclizine and reglan in the ED   - CTH no change from prior   - continue to monitor

## 2024-01-07 NOTE — PROGRESS NOTE ADULT - SUBJECTIVE AND OBJECTIVE BOX
Patient is a 83y old  Male who presents with a chief complaint of frequent falls, weakness (07 Jan 2024 08:40)    Patient seen in f/u. Sitter at bedside. Notes intermittently comfused. resting comfortably     MEDICATIONS  (STANDING):  aspirin  chewable 81 milliGRAM(s) Oral daily  dextrose 5%. 1000 milliLiter(s) (80 mL/Hr) IV Continuous <Continuous>  donepezil 5 milliGRAM(s) Oral at bedtime  melatonin 3 milliGRAM(s) Oral at bedtime  sodium chloride 0.9%. 1000 milliLiter(s) (100 mL/Hr) IV Continuous <Continuous>  tamsulosin 0.4 milliGRAM(s) Oral at bedtime    MEDICATIONS  (PRN):    Vital Signs Last 24 Hrs  T(C): 36.6 (07 Jan 2024 05:48), Max: 36.8 (06 Jan 2024 20:13)  T(F): 97.9 (07 Jan 2024 05:48), Max: 98.2 (06 Jan 2024 20:13)  HR: 82 (07 Jan 2024 05:48) (82 - 88)  BP: 108/48 (07 Jan 2024 05:48) (108/48 - 116/66)  BP(mean): 64 (06 Jan 2024 11:50) (64 - 64)  RR: 18 (07 Jan 2024 05:48) (17 - 18)  SpO2: 94% (07 Jan 2024 05:48) (94% - 94%)    Parameters below as of 07 Jan 2024 05:48  Patient On (Oxygen Delivery Method): room air        PE  NAD  Resting comfortably in bed.   Anicteric, MMM  RRR  Normal respiratory effort   Abd soft, NT, ND  No c/c/e  No rash grossly  FROM                          8.4    48.41 )-----------( 356      ( 07 Jan 2024 07:25 )             27.2       01-06    141  |  105  |  14  ----------------------------<  73  3.5   |  25  |  0.81    Ca    10.4      06 Jan 2024 07:35  Phos  3.3     01-06  Mg     1.8     01-06    TPro  5.3<L>  /  Alb  1.4<L>  /  TBili  0.8  /  DBili  x   /  AST  11  /  ALT  15  /  AlkPhos  127<H>  01-06

## 2024-01-07 NOTE — PROGRESS NOTE ADULT - SUBJECTIVE AND OBJECTIVE BOX
no complaints, ROS - .           aspirin  chewable 81 milliGRAM(s) Oral daily  dextrose 5%. 1000 milliLiter(s) IV Continuous <Continuous>  donepezil 5 milliGRAM(s) Oral at bedtime  melatonin 3 milliGRAM(s) Oral at bedtime  sodium chloride 0.9%. 1000 milliLiter(s) IV Continuous <Continuous>  tamsulosin 0.4 milliGRAM(s) Oral at bedtime                            8.4    x     )-----------( 356      ( 07 Jan 2024 07:25 )             27.2       Hemoglobin: 8.4 g/dL (01-07 @ 07:25)  Hemoglobin: 8.6 g/dL (01-06 @ 07:35)  Hemoglobin: 8.9 g/dL (01-05 @ 06:00)  Hemoglobin: 9.4 g/dL (01-04 @ 14:01)      01-06    141  |  105  |  14  ----------------------------<  73  3.5   |  25  |  0.81    Ca    10.4      06 Jan 2024 07:35  Phos  3.3     01-06  Mg     1.8     01-06    TPro  5.3<L>  /  Alb  1.4<L>  /  TBili  0.8  /  DBili  x   /  AST  11  /  ALT  15  /  AlkPhos  127<H>  01-06    Creatinine Trend: 0.81<--, 0.79<--, 0.95<--    COAGS:           T(C): 36.6 (01-07-24 @ 05:48), Max: 36.8 (01-06-24 @ 20:13)  HR: 82 (01-07-24 @ 05:48) (82 - 88)  BP: 108/48 (01-07-24 @ 05:48) (108/48 - 116/66)  RR: 18 (01-07-24 @ 05:48) (17 - 18)  SpO2: 94% (01-07-24 @ 05:48) (94% - 94%)  Wt(kg): --    I&O's Summary    06 Jan 2024 07:01  -  07 Jan 2024 07:00  --------------------------------------------------------  IN: 0 mL / OUT: 325 mL / NET: -325 mL      HEENT:  (-)icterus (-)pallor  CV: N S1 S2 1/6 DANNY (+)2 Pulses B/l  Resp:  Clear to ausculatation B/L, normal effort  GI: (+) BS Soft, NT, ND  Lymph:  (-)Edema, (-)obvious lymphadenopathy  Skin: Warm to touch, Normal turgor  Psych: Appropriate mood and affect    ASSESSMENT/PLAN: 	83y Male  PMHx of BPH w/ urinary retention, stroke (2013), HTN, DM, afib not on ac, dementia, erectile dysfunction, vertigo, leukocytosis of unknown origin (currently being worked up at [facility] Dr. 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    # Abnormal EKG  - Appear to have wondering atrial pacemaker  - tachy overnight, no complaints of palpitation   - ECHO noted , normal LV fx    # Fall  - No evidence of LOC    # Leukocytosis  - Heme/onc f/u

## 2024-01-07 NOTE — DIETITIAN INITIAL EVALUATION ADULT - NSICDXPASTMEDICALHX_GEN_ALL_CORE_FT
PAST MEDICAL HISTORY:  Afib     BPH with elevated PSA     History of stroke     HTN (hypertension)     Vertigo

## 2024-01-07 NOTE — DISCHARGE NOTE PROVIDER - CARE PROVIDER_API CALL
Sophie Yun [facility]  Internal Medicine  [address], [address] - [place] [address]  Phone: [phone]  Fax: [phone]  Follow Up Time: Routine   Sophie Yun [facility]  Internal Medicine  [address] [place] [address]  Phone: [phone]  Fax: [phone]  Follow Up Time: Routine   Sophie Yun [facility]  Internal Medicine  [address] [place] [address]  Phone: [phone]  Fax: [phone]  Follow Up Time: Routine   Sophie Yun [facility]  Internal Medicine  [address], [address] [place] [address]  Phone: [phone]  Fax: [phone]  Follow Up Time: Routine

## 2024-01-07 NOTE — DIETITIAN INITIAL EVALUATION ADULT - PERTINENT MEDS FT
MEDICATIONS  (STANDING):  aspirin  chewable 81 milliGRAM(s) Oral daily  dextrose 5%. 1000 milliLiter(s) (80 mL/Hr) IV Continuous <Continuous>  donepezil 5 milliGRAM(s) Oral at bedtime  melatonin 3 milliGRAM(s) Oral at bedtime  sodium chloride 0.9%. 1000 milliLiter(s) (100 mL/Hr) IV Continuous <Continuous>  tamsulosin 0.4 milliGRAM(s) Oral at bedtime    MEDICATIONS  (PRN):

## 2024-01-07 NOTE — DIETITIAN INITIAL EVALUATION ADULT - PROBLEM SELECTOR PLAN 3
- Albumin 1.7, Ca 11 (corrected 13), according to BLANCHE, pt's Ca has been around 11 so this is not an acute finding  - start IVF 100cc/hr for 10 hours, no need for calcitriol/bisphosphonates at this time   - f/u PTH, PTHrp, Vitamin D level, ionized calcium  - QMA consult in the AM by primary team

## 2024-01-07 NOTE — PROGRESS NOTE ADULT - ASSESSMENT
leukocytosis -- Infectious w/u negative. ? reactive possibly to underlying malignancy.  His WBC in November of 2023 was 28.  --  Bone marrow biopsy performed in December was unremarkable and  No diagnostic abnormalities   - Will monitor for now      Thigh lesion --  per imaging in November of 2023, patient was noted to have a complex 14 cm mass in the medial right upper thigh worrisome for malignant soft tissue neoplasm.  -- Surgery following possible excisional biopsy pending cardiac clearance   - F/u surgical recs. Appreciate input     possible metastatic cancer -- Elevated PSA, thigh lesion concerning for sarcoma, patient certainly may have metastatic prostate cancer or sarcoma  --  Was scheduled for biopsy as outpatient  --  Recommend repeat CT of the chest, abdomen, and pelvis with contrast to reevaluate underlying disease process   - Will follow      elevated PSA, outpatient MRI of the prostate concerning for prostate cancer, PI-RADS 5.  Most recent PSA in office was in October when it was about 15  -- f/u Urilogy recs   - Recheck PSA     renal complex cyst -- noted in outpt imaging  -- f/u    - Check PSA - ordered      anemia --  hemoglobin in office in November was about 12.  Acutely decreased at this time  - Likely acute illness and underlying disease process   - Check iron studies in the AM as well as LDH, fibrinogen and haptoglobin- ordered     hypercalcemia - resolved    Will follow     Milly Reyes D.O  Hematology Oncology   [facility]  [phone] ( Office)   Evenings and weekends please call MD on call or office   leukocytosis -- Infectious w/u negative. ? reactive possibly to underlying malignancy.  His WBC in November of 2023 was 28.  --  Bone marrow biopsy performed in December was unremarkable and  No diagnostic abnormalities   - Will monitor for now      Thigh lesion --  per imaging in November of 2023, patient was noted to have a complex 14 cm mass in the medial right upper thigh worrisome for malignant soft tissue neoplasm.  -- Surgery following possible excisional biopsy pending cardiac clearance   - F/u surgical recs. Appreciate input     possible metastatic cancer -- Elevated PSA, thigh lesion concerning for sarcoma, patient certainly may have metastatic prostate cancer or sarcoma  --  Was scheduled for biopsy as outpatient  --  Recommend repeat CT of the chest, abdomen, and pelvis with contrast to reevaluate underlying disease process   - Will follow      elevated PSA, outpatient MRI of the prostate concerning for prostate cancer, PI-RADS 5.  Most recent PSA in office was in October when it was about 15  -- f/u Urilogy recs   - Recheck PSA     renal complex cyst -- noted in outpt imaging  -- f/u    - Check PSA - ordered      anemia --  hemoglobin in office in November was about 12.  Acutely decreased at this time  - Likely acute illness and underlying disease process   - Check iron studies in the AM as well as LDH, fibrinogen and haptoglobin- ordered     hypercalcemia - resolved    Will follow     Milly Reyes D.O  Hematology Oncology   [facility]  [phone] ( Office)   Evenings and weekends please call MD on call or office

## 2024-01-07 NOTE — DIETITIAN INITIAL EVALUATION ADULT - PROBLEM SELECTOR PLAN 4
- pt has been undergoing a w/u with Dr. Pugh at [facility], according to family, bone marrow biopsy negative for leukemia  - [facility] w/u from paperwork brought in by wife:  1. 11/2023 MRI Prostate- 2.1 x 1.4 cm lesion in right anterior base to mid gland transition zone suspicious for prostate ca.   2. 11/2023 CT chest ab/p/soft tissues- complex 14 cm mass of the medial right upper thigh in the level of the gracilis muscle concerning for  malignant soft tissue neoplasm, pulmonary nodules w/ emphysema, complex 0.9 cm left renal lesion possibly neoplasm   3. 10/2023 MRI Brain non con - mild to mod cerebral atrophy and chronic microvascular ischemic changes in the white matter. Chronic right cerebellar infarct   - QMA, surgical onc, and urology consult in the AM by primary team - pt has been undergoing a w/u with Dr. Pugh at [facility], according to family, bone marrow biopsy negative for leukemia  - [facility] w/u from paperwork brought in by wife:  1. 11/2023 MRI Prostate- 2.1 x 1.4 cm lesion in right anterior base to mid gland transition zone suspicious for prostate ca.   2. 11/2023 CT chest ab/p/soft tissues- complex 14 cm mass of the medial right upper thigh in the level of the gracilis muscle concerning for  malignant soft tissue neoplasm, pulmonary nodules w/ emphysema, complex 0.9 cm left renal lesion possibly neoplasm   3. 10/2023 MRI Brain non con - mild to mod cerebral atrophy and chronic microvascular ischemic changes in the white matter. Chronic right cerebellar infarct   - QMA, surgical onc, and urology consult in the AM by primary team

## 2024-01-07 NOTE — PROGRESS NOTE ADULT - ASSESSMENT
Assessment  83M w/R thigh soft tissue mass. Stable, afebrile.    P:  - Plan for excisional bx of R leg mass tentatively planned for this admission  - Recommend heme/onc and urology f/u given chronic leukocytosis and prostate mass suspicious for malignancy  - Cardiology clearance 1/6 appreciated.  - Surgical oncology to follow, please page w/any further concerns

## 2024-01-07 NOTE — DIETITIAN INITIAL EVALUATION ADULT - PROBLEM SELECTOR PLAN 2
- WC 42, baseline around 20 as per HIE   - pt has been undergoing a w/u with Dr. Pugh at [facility], according to family, bone marrow biopsy negative for leukemia  - continue to monitor   - will send U/A to r/o UTI (pt asymptomatic, but with cloudy urine in mckeon), r/o infection   - QMA consult in the AM by primary team - WC 42, baseline around 20 as per HIE   - pt has been undergoing a w/u with Dr. Pugh at [facility], according to family, bone marrow biopsy negative for leukemia  - continue to monitor   - will send U/A to r/o UTI (pt asymptomatic, but with cloudy urine in mckeon), r/o infection   - QMA consult in the AM by primary team

## 2024-01-07 NOTE — DIETITIAN INITIAL EVALUATION ADULT - FACTORS AFF FOOD INTAKE
acute on chronic comorbidities/change in mental status/persistent lack of appetite/Spiritism/ethnic/cultural/personal food preferences acute on chronic comorbidities/change in mental status/persistent lack of appetite/Islam/ethnic/cultural/personal food preferences

## 2024-01-07 NOTE — DIETITIAN INITIAL EVALUATION ADULT - ENERGY INTAKE
from H&P: "very weak, especially in the past three months. Pt endorses losing 60 pounds in the past 1.5 years, and wife endorses that he has had decreased appetite for the past 3 months" per MD

## 2024-01-08 NOTE — PROGRESS NOTE ADULT - SUBJECTIVE AND OBJECTIVE BOX
DATE OF SERVICE: 01-08-24    Patient denies chest pain or shortness of breath.    Review of symptoms otherwise negative.    aspirin  chewable 81 milliGRAM(s) Oral daily  dextrose 5%. 1000 milliLiter(s) IV Continuous <Continuous>  donepezil 5 milliGRAM(s) Oral at bedtime  melatonin 3 milliGRAM(s) Oral at bedtime  sodium chloride 0.9%. 1000 milliLiter(s) IV Continuous <Continuous>  tamsulosin 0.4 milliGRAM(s) Oral at bedtime                            9.3    57.13 )-----------( 387      ( 08 Jan 2024 07:46 )             30.2       Hemoglobin: 9.3 g/dL (01-08 @ 07:46)  Hemoglobin: 8.4 g/dL (01-07 @ 07:25)  Hemoglobin: 8.6 g/dL (01-06 @ 07:35)  Hemoglobin: 8.9 g/dL (01-05 @ 06:00)  Hemoglobin: 9.4 g/dL (01-04 @ 14:01)      01-08    137  |  102  |  16  ----------------------------<  94  3.5   |  26  |  0.99    Ca    11.2<H>      08 Jan 2024 07:46  Phos  3.2     01-08  Mg     1.9     01-08    TPro  6.2  /  Alb  1.6<L>  /  TBili  1.0  /  DBili  x   /  AST  22  /  ALT  23  /  AlkPhos  163<H>  01-08    Creatinine Trend: 0.99<--, 0.81<--, 0.79<--, 0.95<--    COAGS:           T(C): 36.7 (01-08-24 @ 05:13), Max: 37.3 (01-07-24 @ 20:19)  HR: 97 (01-08-24 @ 05:13) (85 - 109)  BP: 112/65 (01-08-24 @ 05:13) (112/52 - 136/69)  RR: 18 (01-08-24 @ 05:13) (18 - 18)  SpO2: 93% (01-08-24 @ 05:13) (91% - 93%)  Wt(kg): --    I&O's Summary      HEENT:  (-)icterus (-)pallor  CV: N S1 S2 1/6 DANNY (+)2 Pulses B/l  Resp:  Clear to ausculatation B/L, normal effort  GI: (+) BS Soft, NT, ND  Lymph:  (-)Edema, (-)obvious lymphadenopathy  Skin: Warm to touch, Normal turgor  Psych: Appropriate mood and affect    ASSESSMENT/PLAN: 	83y Male  PMHx of BPH w/ urinary retention, stroke (2013), HTN, DM, afib not on ac, dementia, erectile dysfunction, vertigo, leukocytosis of unknown origin (currently being worked up at [facility] Dr. 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    # Abnormal EKG  - Appear to have wondering atrial pacemaker  - tachy overnight, no complaints of palpitation   - ECHO noted , normal LV fx  - No need for further inpatient cardiac work up.      # Fall  - No evidence of LOC    # Leukocytosis  - Heme/onc f/u  - Surgery f/u noted    I once again thank you for allowing me to participate in the care of your patient.  If you have any questions or concerns please do not hesitate to contact me.    Fredi Wilson MD, [facility]  BEEPER [phone]     DATE OF SERVICE: 01-08-24    Patient denies chest pain or shortness of breath.    Review of symptoms otherwise negative.    aspirin  chewable 81 milliGRAM(s) Oral daily  dextrose 5%. 1000 milliLiter(s) IV Continuous <Continuous>  donepezil 5 milliGRAM(s) Oral at bedtime  melatonin 3 milliGRAM(s) Oral at bedtime  sodium chloride 0.9%. 1000 milliLiter(s) IV Continuous <Continuous>  tamsulosin 0.4 milliGRAM(s) Oral at bedtime                            9.3    57.13 )-----------( 387      ( 08 Jan 2024 07:46 )             30.2       Hemoglobin: 9.3 g/dL (01-08 @ 07:46)  Hemoglobin: 8.4 g/dL (01-07 @ 07:25)  Hemoglobin: 8.6 g/dL (01-06 @ 07:35)  Hemoglobin: 8.9 g/dL (01-05 @ 06:00)  Hemoglobin: 9.4 g/dL (01-04 @ 14:01)      01-08    137  |  102  |  16  ----------------------------<  94  3.5   |  26  |  0.99    Ca    11.2<H>      08 Jan 2024 07:46  Phos  3.2     01-08  Mg     1.9     01-08    TPro  6.2  /  Alb  1.6<L>  /  TBili  1.0  /  DBili  x   /  AST  22  /  ALT  23  /  AlkPhos  163<H>  01-08    Creatinine Trend: 0.99<--, 0.81<--, 0.79<--, 0.95<--    COAGS:           T(C): 36.7 (01-08-24 @ 05:13), Max: 37.3 (01-07-24 @ 20:19)  HR: 97 (01-08-24 @ 05:13) (85 - 109)  BP: 112/65 (01-08-24 @ 05:13) (112/52 - 136/69)  RR: 18 (01-08-24 @ 05:13) (18 - 18)  SpO2: 93% (01-08-24 @ 05:13) (91% - 93%)  Wt(kg): --    I&O's Summary      HEENT:  (-)icterus (-)pallor  CV: N S1 S2 1/6 DANNY (+)2 Pulses B/l  Resp:  Clear to ausculatation B/L, normal effort  GI: (+) BS Soft, NT, ND  Lymph:  (-)Edema, (-)obvious lymphadenopathy  Skin: Warm to touch, Normal turgor  Psych: Appropriate mood and affect    ASSESSMENT/PLAN: 	83y Male  PMHx of BPH w/ urinary retention, stroke (2013), HTN, DM, afib not on ac, dementia, erectile dysfunction, vertigo, leukocytosis of unknown origin (currently being worked up at [facility] Dr. 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    # Abnormal EKG  - Appear to have wondering atrial pacemaker  - tachy overnight, no complaints of palpitation   - ECHO noted , normal LV fx  - No need for further inpatient cardiac work up.      # Fall  - No evidence of LOC    # Leukocytosis  - Heme/onc f/u  - Surgery f/u noted    I once again thank you for allowing me to participate in the care of your patient.  If you have any questions or concerns please do not hesitate to contact me.    Fredi Wilson MD, [facility]  BEEPER [phone]

## 2024-01-08 NOTE — PROGRESS NOTE ADULT - SUBJECTIVE AND OBJECTIVE BOX
HPI:  Pt is a 83 y.o M w/ PMHx of BPH w/ urinary retention, stroke (2013), HTN, DM, afib not on ac, dementia, erectile dysfunction, vertigo, leukocytosis of unknown origin (currently being worked up at [facility] Dr. 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 Wife in the HCP and is at bedside assisting in history taking. This was purely a mechanical fall as per patient and family, denies LOC, hitting head. States that he has vertigo and balance issues, but he has also been very weak, especially in the past three months. Pt endorses losing 60 pounds in the past 1.5 years, and wife endorses that he has had decreased appetite for the past 3 months. Pt also has intermittent night sweats. Pt and wife have noticed a right thigh mass, that they saw surgical oncologist Dr. Pugh for out-patient, and in order to do a biopsy on the mass, pt need cardiac clearance (which is why they were going to the cardiologist this morning). According to the wife, pt has also been seeing Dr. Domingo for his BPH and questionable prostate cancer. Denies dizziness currently, chest pain, sob, palpitations, fevers, chills, sick contacts, recent travel , urinary symptoms.     [facility] w/u from paperwork brought in by wife:  1. 11/2023 MRI Prostate- 2.1 x 1.4 cm lesion in right anterior base to mid gland transition zone suspicious for prostate ca.   2. 11/2023 CT chest ab/p/soft tissues- complex 14 cm mass of the medial right upper thigh in the level of the gracilis muscle concerning for  malignant soft tissue neoplasm, pulmonary nodules w/ emphysema, complex 0.9 cm left renal lesion possibly neoplasm   3. 10/2023 MRI Brain non con - mild to mod cerebral atrophy and chronic microvascular ischemic changes in the white matter. Chronic right cerebellar infarct  (04 Jan 2024 18:35)     Pt is seen and examined  pt is awake and lying in bed/out of bed to chair  pt seems comfortable and denies any complaints at this time    ROS:  Negative except for:    MEDICATIONS  (STANDING):  aspirin  chewable 81 milliGRAM(s) Oral daily  dextrose 5%. 1000 milliLiter(s) (80 mL/Hr) IV Continuous <Continuous>  donepezil 5 milliGRAM(s) Oral at bedtime  melatonin 3 milliGRAM(s) Oral at bedtime  sodium chloride 0.9%. 1000 milliLiter(s) (100 mL/Hr) IV Continuous <Continuous>  tamsulosin 0.4 milliGRAM(s) Oral at bedtime    MEDICATIONS  (PRN):      Allergies    Seafood (Unknown)  No Known Drug Allergies    Intolerances        Vital Signs Last 24 Hrs  T(C): 36.7 (08 Jan 2024 05:13), Max: 37.3 (07 Jan 2024 20:19)  T(F): 98 (08 Jan 2024 05:13), Max: 99.1 (07 Jan 2024 20:19)  HR: 97 (08 Jan 2024 05:13) (85 - 109)  BP: 112/65 (08 Jan 2024 05:13) (112/52 - 136/69)  BP(mean): 81 (08 Jan 2024 05:13) (81 - 91)  RR: 18 (08 Jan 2024 05:13) (18 - 18)  SpO2: 93% (08 Jan 2024 05:13) (91% - 93%)    Parameters below as of 08 Jan 2024 05:13  Patient On (Oxygen Delivery Method): room air        PHYSICAL EXAM  General: adult in NAD  HEENT: clear oropharynx, anicteric sclera, pink conjunctiva  Neck: supple  CV: normal S1/S2 with no murmur rubs or gallops  Lungs: positive air movement b/l ant lungs,clear to auscultation, no wheezes, no rales  Abdomen: soft non-tender non-distended, no hepatosplenomegaly  Ext: no clubbing cyanosis or edema  Skin: no rashes and no petechiae  Neuro: alert and oriented X 4, no focal deficits  LABS:                          9.3    57.13 )-----------( 387      ( 08 Jan 2024 07:46 )             30.2         Mean Cell Volume : 91.8 fl  Mean Cell Hemoglobin : 28.3 pg  Mean Cell Hemoglobin Concentration : 30.8 gm/dL  Auto Neutrophil # : x  Auto Lymphocyte # : x  Auto Monocyte # : x  Auto Eosinophil # : x  Auto Basophil # : x  Auto Neutrophil % : x  Auto Lymphocyte % : x  Auto Monocyte % : x  Auto Eosinophil % : x  Auto Basophil % : x    Serial CBC  Hematocrit 30.2  Hemoglobin 9.3  Plat 387  RBC 3.29  WBC 57.13  Serial CBC  Hematocrit 27.2  Hemoglobin 8.4  Plat 356  RBC 2.91  WBC 48.41  Serial CBC  Hematocrit 28.5  Hemoglobin 8.6  Plat 357  RBC 3.02  WBC 50.24  Serial CBC  Hematocrit 29.6  Hemoglobin 8.9  Plat 385  RBC 3.09  WBC 45.53  Serial CBC  Hematocrit 31.3  Hemoglobin 9.4  Plat 412  RBC 3.35  WBC 47.92    01-08    137  |  102  |  16  ----------------------------<  94  3.5   |  26  |  0.99    Ca    11.2<H>      08 Jan 2024 07:46  Phos  3.2     01-08  Mg     1.9     01-08    TPro  6.2  /  Alb  1.6<L>  /  TBili  1.0  /  DBili  x   /  AST  22  /  ALT  23  /  AlkPhos  163<H>  01-08          Iron - Total Binding Capacity.: 190 ug/dL (01-08 @ 07:46)  Reticulocyte Percent: 2.7 % (01-08 @ 07:46)            BLOOD SMEAR INTERPRETATION:       RADIOLOGY & ADDITIONAL STUDIES:       HPI:  Pt is a 83 y.o M w/ PMHx of BPH w/ urinary retention, stroke (2013), HTN, DM, afib not on ac, dementia, erectile dysfunction, vertigo, leukocytosis of unknown origin (currently being worked up at [facility] Dr. 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 Wife in the HCP and is at bedside assisting in history taking. This was purely a mechanical fall as per patient and family, denies LOC, hitting head. States that he has vertigo and balance issues, but he has also been very weak, especially in the past three months. Pt endorses losing 60 pounds in the past 1.5 years, and wife endorses that he has had decreased appetite for the past 3 months. Pt also has intermittent night sweats. Pt and wife have noticed a right thigh mass, that they saw surgical oncologist Dr. Pugh for out-patient, and in order to do a biopsy on the mass, pt need cardiac clearance (which is why they were going to the cardiologist this morning). According to the wife, pt has also been seeing Dr. Domingo for his BPH and questionable prostate cancer. Denies dizziness currently, chest pain, sob, palpitations, fevers, chills, sick contacts, recent travel , urinary symptoms.     [facility] w/u from paperwork brought in by wife:  1. 11/2023 MRI Prostate- 2.1 x 1.4 cm lesion in right anterior base to mid gland transition zone suspicious for prostate ca.   2. 11/2023 CT chest ab/p/soft tissues- complex 14 cm mass of the medial right upper thigh in the level of the gracilis muscle concerning for  malignant soft tissue neoplasm, pulmonary nodules w/ emphysema, complex 0.9 cm left renal lesion possibly neoplasm   3. 10/2023 MRI Brain non con - mild to mod cerebral atrophy and chronic microvascular ischemic changes in the white matter. Chronic right cerebellar infarct  (04 Jan 2024 18:35)     Pt is seen and examined  pt is awake and lying in bed/out of bed to chair  pt seems comfortable and denies any complaints at this time    ROS:  Negative except for:    MEDICATIONS  (STANDING):  aspirin  chewable 81 milliGRAM(s) Oral daily  dextrose 5%. 1000 milliLiter(s) (80 mL/Hr) IV Continuous <Continuous>  donepezil 5 milliGRAM(s) Oral at bedtime  melatonin 3 milliGRAM(s) Oral at bedtime  sodium chloride 0.9%. 1000 milliLiter(s) (100 mL/Hr) IV Continuous <Continuous>  tamsulosin 0.4 milliGRAM(s) Oral at bedtime    MEDICATIONS  (PRN):      Allergies    Seafood (Unknown)  No Known Drug Allergies    Intolerances        Vital Signs Last 24 Hrs  T(C): 36.7 (08 Jan 2024 05:13), Max: 37.3 (07 Jan 2024 20:19)  T(F): 98 (08 Jan 2024 05:13), Max: 99.1 (07 Jan 2024 20:19)  HR: 97 (08 Jan 2024 05:13) (85 - 109)  BP: 112/65 (08 Jan 2024 05:13) (112/52 - 136/69)  BP(mean): 81 (08 Jan 2024 05:13) (81 - 91)  RR: 18 (08 Jan 2024 05:13) (18 - 18)  SpO2: 93% (08 Jan 2024 05:13) (91% - 93%)    Parameters below as of 08 Jan 2024 05:13  Patient On (Oxygen Delivery Method): room air        PHYSICAL EXAM  General: adult in NAD  HEENT: clear oropharynx, anicteric sclera, pink conjunctiva  Neck: supple  CV: normal S1/S2 with no murmur rubs or gallops  Lungs: positive air movement b/l ant lungs,clear to auscultation, no wheezes, no rales  Abdomen: soft non-tender non-distended, no hepatosplenomegaly  Ext: no clubbing cyanosis or edema  Skin: no rashes and no petechiae  Neuro: alert and oriented X 4, no focal deficits  LABS:                          9.3    57.13 )-----------( 387      ( 08 Jan 2024 07:46 )             30.2         Mean Cell Volume : 91.8 fl  Mean Cell Hemoglobin : 28.3 pg  Mean Cell Hemoglobin Concentration : 30.8 gm/dL  Auto Neutrophil # : x  Auto Lymphocyte # : x  Auto Monocyte # : x  Auto Eosinophil # : x  Auto Basophil # : x  Auto Neutrophil % : x  Auto Lymphocyte % : x  Auto Monocyte % : x  Auto Eosinophil % : x  Auto Basophil % : x    Serial CBC  Hematocrit 30.2  Hemoglobin 9.3  Plat 387  RBC 3.29  WBC 57.13  Serial CBC  Hematocrit 27.2  Hemoglobin 8.4  Plat 356  RBC 2.91  WBC 48.41  Serial CBC  Hematocrit 28.5  Hemoglobin 8.6  Plat 357  RBC 3.02  WBC 50.24  Serial CBC  Hematocrit 29.6  Hemoglobin 8.9  Plat 385  RBC 3.09  WBC 45.53  Serial CBC  Hematocrit 31.3  Hemoglobin 9.4  Plat 412  RBC 3.35  WBC 47.92    01-08    137  |  102  |  16  ----------------------------<  94  3.5   |  26  |  0.99    Ca    11.2<H>      08 Jan 2024 07:46  Phos  3.2     01-08  Mg     1.9     01-08    TPro  6.2  /  Alb  1.6<L>  /  TBili  1.0  /  DBili  x   /  AST  22  /  ALT  23  /  AlkPhos  163<H>  01-08          Iron - Total Binding Capacity.: 190 ug/dL (01-08 @ 07:46)  Reticulocyte Percent: 2.7 % (01-08 @ 07:46)            BLOOD SMEAR INTERPRETATION:       RADIOLOGY & ADDITIONAL STUDIES:

## 2024-01-08 NOTE — PROGRESS NOTE ADULT - PROBLEM SELECTOR PLAN 5
Leukocytosis with negative outpatient leukemia workup  CT Abd & Pelvis- right thigh mass  CT Chest Metastatic burden lungs  Sx/Onc consulted: not a candidate for excisional biopsy  IR consulted for Core biopsy of thigh recs MRI first  Palliative consulted  Heme/Onc following

## 2024-01-08 NOTE — PROGRESS NOTE ADULT - SUBJECTIVE AND OBJECTIVE BOX
SURGICAL ONCOLOGY PROGRESS NOTE    S: Patient seen and examined at bedside. No acute overnight events. HD stable, afebrile. Staging CT chest performed showing significant and enlarging likely metastatic lesions. PSA noted elevated during this admission concordant w/report OP records.     O:  Vital Signs Last 24 Hrs  T(C): 36.4 (08 Jan 2024 14:01), Max: 37.3 (07 Jan 2024 20:19)  T(F): 97.6 (08 Jan 2024 14:01), Max: 99.1 (07 Jan 2024 20:19)  HR: 77 (08 Jan 2024 14:01) (77 - 109)  BP: 108/52 (08 Jan 2024 14:01) (108/52 - 112/65)  BP(mean): 81 (08 Jan 2024 05:13) (81 - 81)  RR: 16 (08 Jan 2024 14:01) (16 - 18)  SpO2: 94% (08 Jan 2024 14:01) (92% - 94%)    Parameters below as of 08 Jan 2024 14:01  Patient On (Oxygen Delivery Method): room air    Physical Examination:  Gen: Awake, somnolent but arousable to verbal stimuli, oriented x0 and not cooperative w/examination or questioning  Pulm: Normal work of breathing on room air  Abd: Soft, non-distended, non-tender. No guarding, rigidity, or rebound  Ext: R medial superior thigh w/~10cm x 10cm soft, mobile soft tissue mass w/o overlying skin changes, no open wounds, no ulceration, no TTP. Distal pulses intact and strength/sensation 5/5 and symmetric    A: 83M w/R thigh soft tissue mass w/likely metastatic pulmonary lesions and ?prostate CA    P:  - Given inpatient w/u and history of declining mental status w/increasing frailty in setting of likely multifocal malignancy vs metastatic burden, excisional biopsy of RLE mass unlikely to provide any clinical benefit and has subsequently been cancelled - d/w patient's wife at bedside who understands and agrees w/plan for less invasive w/u of RLE mass  - Current plan for MRI RLE and possible percutaneous core needle bx by IR  - Surgical oncology to sign off as pt is no longer a surgical candidate - please reconsult w/any further concerns

## 2024-01-08 NOTE — PROGRESS NOTE ADULT - NS ATTEND AMEND GEN_ALL_CORE FT
Patient was seen and examined with wife and family friend at bedside, denies any new complains. Confused    ICU Vital Signs Last 24 Hrs  T(C): 36.7 (08 Jan 2024 05:13), Max: 37.3 (07 Jan 2024 20:19)  T(F): 98 (08 Jan 2024 05:13), Max: 99.1 (07 Jan 2024 20:19)  HR: 97 (08 Jan 2024 05:13) (85 - 109)  BP: 112/65 (08 Jan 2024 05:13) (112/52 - 136/69)  BP(mean): 81 (08 Jan 2024 05:13) (81 - 91)  ABP: --  ABP(mean): --  RR: 18 (08 Jan 2024 05:13) (18 - 18)  SpO2: 93% (08 Jan 2024 05:13) (91% - 93%)    O2 Parameters below as of 08 Jan 2024 05:13  Patient On (Oxygen Delivery Method): room air        P/E:  GENERAL: NAD  HEAD:  Atraumatic, Normocephalic  NERVOUS SYSTEM:  Alert & Oriented X1, not following commands to complete neuro exam  CHEST/LUNG: Clear to auscultation bilaterally; No rales, rhonchi, wheezing, or rubs  HEART: Regular rate and rhythm; No murmurs, rubs, or gallops  ABDOMEN: Soft, Nontender, Nondistended; Bowel sounds present  EXTREMITIES:  right thigh mass same as yesterday      I reviewed CBC, BMP, Mg, Phos, LFTs, Blood sugar, urine studies and culture results.   I ordered repeat CBC, BMP and formulated the plan as below  Discussed with Dr. Mosqueda and Dr. Birmingham      A/P:  Leukocytosis, possible due to malignancy, ruled out leukemia with BM biopsy   Atrial fibrillation not on AC  CVA, cerebellar infarct   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   Enlarged prostate, concern for malignancy   Renal complex mass  Lung nodules suspicious of malignancy   Emphysema   CKD  HTN  HLD  Delirium, possible hospital onset delirium; unlikely infection, CT head neg, electrolytes stable- resolved   Hypercalcemia of malignancy improving  Severe protein calorie malnutrition      Plan:   Leukocytosis possibly for malignancy, CT scans from yesterday noted   Discussed with Dr. Birmingham and Dr. Mosqueda, patient is not a surgical candidate due to advanced disease, co-morbidities and advanced age and poor functional status   Recommends IR guided core biopsy. If thigh mass is a sarcoma then will offer palliative immunotherapy  Discussed with wife at bedside. Was inquiring about radiation therapy- which is not offered either. Agrees for core biopsy and aware that there are other malignancies listed above which will be still left. IR guided lung biopsy has more serious risks like pneumothorax.   Avoid anticholinergics, benzodiazepines, limit lines/tubes/tethers/restraints, encourage frequent reorientation/reassurance by staff, encourage good sleep hygiene, adequate lighting  will cont IVF. Calcium was not responsible for AMS as mental status was at baseline on admission with higher calcium level  PTH appropriately low   Daily CBC and  BMP  Cont gentle hydration with D5W  not on any meds for afib, EKG on admission shows sinus rhythm with PAC  TTE noted  Cardiology consult appreciated  Surgical oncology and Urology consult appreciated  cont home meds for HTN   Add Ensure on the diet   Full code  Palliative care consulted Patient was seen and examined with wife and family friend at bedside, denies any new complains. Confused    ICU Vital Signs Last 24 Hrs  T(C): 36.7 (08 Jan 2024 05:13), Max: 37.3 (07 Jan 2024 20:19)  T(F): 98 (08 Jan 2024 05:13), Max: 99.1 (07 Jan 2024 20:19)  HR: 97 (08 Jan 2024 05:13) (85 - 109)  BP: 112/65 (08 Jan 2024 05:13) (112/52 - 136/69)  BP(mean): 81 (08 Jan 2024 05:13) (81 - 91)  ABP: --  ABP(mean): --  RR: 18 (08 Jan 2024 05:13) (18 - 18)  SpO2: 93% (08 Jan 2024 05:13) (91% - 93%)    O2 Parameters below as of 08 Jan 2024 05:13  Patient On (Oxygen Delivery Method): room air        P/E:  GENERAL: NAD  HEAD:  Atraumatic, Normocephalic  NERVOUS SYSTEM:  Alert & Oriented X1, not following commands to complete neuro exam  CHEST/LUNG: Clear to auscultation bilaterally; No rales, rhonchi, wheezing, or rubs  HEART: Regular rate and rhythm; No murmurs, rubs, or gallops  ABDOMEN: Soft, Nontender, Nondistended; Bowel sounds present  EXTREMITIES:  right thigh mass same as yesterday      I reviewed CBC, BMP, Mg, Phos, LFTs, Blood sugar, urine studies and culture results.   I ordered repeat CBC, BMP and formulated the plan as below  Discussed with Dr. Mosqueda and Dr. Birmingham      A/P:  Leukocytosis, possible due to malignancy, ruled out leukemia with BM biopsy   Atrial fibrillation not on AC  CVA, cerebellar infarct   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   Enlarged prostate, concern for malignancy   Renal complex mass  Lung nodules suspicious of malignancy   Emphysema   CKD  HTN  HLD  Delirium, possible hospital onset delirium; unlikely infection, CT head neg, electrolytes stable- resolved   Hypercalcemia of malignancy improving  Severe protein calorie malnutrition      Plan:   Leukocytosis possibly for malignancy, CT scans from yesterday noted   Discussed with Dr. Birmingham and Dr. Mosqueda, patient is not a surgical candidate due to advanced disease, co-morbidities and advanced age and poor functional status   Recommends IR guided core biopsy. If thigh mass is a sarcoma then will offer palliative immunotherapy  Discussed with wife at bedside. Was inquiring about radiation therapy- which is not offered either. Agrees for core biopsy and aware that there are other malignancies listed above which will be still left. IR guided lung biopsy has more serious risks like pneumothorax.   Avoid anticholinergics, benzodiazepines, limit lines/tubes/tethers/restraints, encourage frequent reorientation/reassurance by staff, encourage good sleep hygiene, adequate lighting  Calcium trending up again. will cont IVF. Calcium was not responsible for AMS as mental status was at baseline on admission with higher calcium level  PTH appropriately low   Daily CBC and  BMP  Cont gentle hydration with D5W  not on any meds for afib, EKG on admission shows sinus rhythm with PAC  TTE noted  Cardiology consult appreciated  Surgical oncology and Urology consult appreciated  cont home meds for HTN   Add Ensure on the diet   Full code  Palliative care consulted

## 2024-01-08 NOTE — PROGRESS NOTE ADULT - SUBJECTIVE AND OBJECTIVE BOX
Vital Signs Last 24 Hrs  T(C): 36.7 (08 Jan 2024 05:13), Max: 37.3 (07 Jan 2024 20:19)  T(F): 98 (08 Jan 2024 05:13), Max: 99.1 (07 Jan 2024 20:19)  HR: 97 (08 Jan 2024 05:13) (85 - 109)  BP: 112/65 (08 Jan 2024 05:13) (112/52 - 136/69)  BP(mean): 81 (08 Jan 2024 05:13) (81 - 91)  RR: 18 (08 Jan 2024 05:13) (18 - 18)  SpO2: 93% (08 Jan 2024 05:13) (91% - 93%)    Parameters below as of 08 Jan 2024 05:13  Patient On (Oxygen Delivery Method): room air        I&O's Detail                            9.3    57.13 )-----------( 387      ( 08 Jan 2024 07:46 )             30.2       01-08    137  |  102  |  16  ----------------------------<  94  3.5   |  26  |  0.99    Ca    11.2<H>      08 Jan 2024 07:46  Phos  3.2     01-08  Mg     1.9     01-08    TPro  6.2  /  Alb  1.6<L>  /  TBili  1.0  /  DBili  x   /  AST  22  /  ALT  23  /  AlkPhos  163<H>  01-08    CT of chest reviewed; multiple enlarging metastatic lesions          PLAN:  The patient is NOT a surgical candidate at this point  If it will change his care, he could have a core needle biopsy in IR of the right thigh mass

## 2024-01-08 NOTE — PROGRESS NOTE ADULT - ASSESSMENT
· Assessment	   leukocytosis --  no symptoms of infection currently, may be reactive or related to worsening underlying malignancy.  His WBC in November of 2023 was 28.  --  Bone marrow biopsy performed in December was unremarkable and nondiagnostic  -- eval for infectious causes in the acute setting given significant rise     thigh lesion --  per imaging in November of 2023, patient was noted to have a complex 14 cm mass in the medial right upper thigh worrisome for malignant soft tissue neoplasm.  --  Follow-up surgery evaluation for possible biopsy     possible metastatic cancer --  with elevated PSA, thigh lesion concerning for sarcoma, patient certainly may have metastatic prostate cancer or sarcoma  -- would repeat CT of the chest, abdomen, and pelvis with contrast at this time to restage.  Would biopsy concerning metastatic lesion if possible.  CT chest showed multiple mets.  will need to do a core biopsy.  preferably from the lung because it will tell us the mets is prostate or STS.     elevated PSA, outpatient MRI of the prostate concerning for prostate cancer, PI-RADS 5.  Most recent PSA in office was in October when it was about 15  -- f/u  eval  -- repeat PSA here    renal complex cyst -- noted in outpt imaging  -- f/u  as well     anemia --  hemoglobin in office in November was about 12.  Acutely decreased at this time  -- may be related to underlying malignancies  -- check iron panel, B12, folic acid, ferritin, haptoglobin  -- check stool guaiac     hypercalcemia --  continue IV fluids, endocrine or renal consult please     will follow

## 2024-01-08 NOTE — PROGRESS NOTE ADULT - SUBJECTIVE AND OBJECTIVE BOX
Patient is a 83y old  Male who presents with a chief complaint of frequent falls, weakness (08 Jan 2024 10:22)      INTERVAL HPI/OVERNIGHT EVENTS: no new complaints    MEDICATIONS  (STANDING):  aspirin  chewable 81 milliGRAM(s) Oral daily  dextrose 5%. 1000 milliLiter(s) (80 mL/Hr) IV Continuous <Continuous>  donepezil 5 milliGRAM(s) Oral at bedtime  melatonin 3 milliGRAM(s) Oral at bedtime  sodium chloride 0.9%. 1000 milliLiter(s) (100 mL/Hr) IV Continuous <Continuous>  tamsulosin 0.4 milliGRAM(s) Oral at bedtime    MEDICATIONS  (PRN):      __________________________________________________  REVIEW OF SYSTEMS:    CONSTITUTIONAL: No fever,   EYES: no acute visual disturbances  NECK: No pain or stiffness  RESPIRATORY: No cough; No shortness of breath  CARDIOVASCULAR: No chest pain, no palpitations  GASTROINTESTINAL: No pain. No nausea or vomiting; No diarrhea   NEUROLOGICAL: No headache or numbness, no tremors  MUSCULOSKELETAL: No joint pain, no muscle pain  GENITOURINARY: no dysuria, no frequency, no hesitancy  PSYCHIATRY: no depression , no anxiety  ALL OTHER  ROS negative      Vital Signs Last 24 Hrs  T(C): 36.7 (08 Jan 2024 05:13), Max: 37.3 (07 Jan 2024 20:19)  T(F): 98 (08 Jan 2024 05:13), Max: 99.1 (07 Jan 2024 20:19)  HR: 97 (08 Jan 2024 05:13) (85 - 109)  BP: 112/65 (08 Jan 2024 05:13) (112/52 - 136/69)  BP(mean): 81 (08 Jan 2024 05:13) (81 - 91)  RR: 18 (08 Jan 2024 05:13) (18 - 18)  SpO2: 93% (08 Jan 2024 05:13) (91% - 93%)    Parameters below as of 08 Jan 2024 05:13  Patient On (Oxygen Delivery Method): room air    ________________________________________________  PHYSICAL EXAM:  GENERAL: NAD  HEENT: Normocephalic;  conjunctivae and sclerae clear; moist mucous membranes;   NECK : supple  CHEST/LUNG: Clear to auscultation bilaterally with good air entry   HEART: S1 S2  regular; no murmurs, gallops or rubs  ABDOMEN: Soft, Nontender, Nondistended; Bowel sounds present  EXTREMITIES: no cyanosis; no edema; no calf tenderness  SKIN: warm and dry; no rash  NERVOUS SYSTEM:  Awake and alert; Oriented.,...          ; no new deficits    _________________________________________________  LABS:                        9.3    57.13 )-----------( 387      ( 08 Jan 2024 07:46 )             30.2     01-08    137  |  102  |  16  ----------------------------<  94  3.5   |  26  |  0.99    Ca    11.2<H>      08 Jan 2024 07:46  Phos  3.2     01-08  Mg     1.9     01-08    TPro  6.2  /  Alb  1.6<L>  /  TBili  1.0  /  DBili  x   /  AST  22  /  ALT  23  /  AlkPhos  163<H>  01-08      Urinalysis Basic - ( 08 Jan 2024 07:46 )    Color: x / Appearance: x / SG: x / pH: x  Gluc: 94 mg/dL / Ketone: x  / Bili: x / Urobili: x   Blood: x / Protein: x / Nitrite: x   Leuk Esterase: x / RBC: x / WBC x   Sq Epi: x / Non Sq Epi: x / Bacteria: x      CAPILLARY BLOOD GLUCOSE      RADIOLOGY & ADDITIONAL TESTS:    Imaging Personally Reviewed:  YES    Consultant(s) Notes Reviewed:   YES    Care Discussed with Consultants :     Plan of care was discussed with patient and /or primary care giver; all questions and concerns were addressed and care was aligned with patient's wishes.       Patient is a 83y old  Male who presents with a chief complaint of frequent falls, weakness (08 Jan 2024 10:22)      INTERVAL HPI/OVERNIGHT EVENTS: pt seen at bedside, he is confused but appears comfortable.    MEDICATIONS  (STANDING):  aspirin  chewable 81 milliGRAM(s) Oral daily  dextrose 5%. 1000 milliLiter(s) (80 mL/Hr) IV Continuous <Continuous>  donepezil 5 milliGRAM(s) Oral at bedtime  melatonin 3 milliGRAM(s) Oral at bedtime  sodium chloride 0.9%. 1000 milliLiter(s) (100 mL/Hr) IV Continuous <Continuous>  tamsulosin 0.4 milliGRAM(s) Oral at bedtime    MEDICATIONS  (PRN):  __________________________________________________  REVIEW OF SYSTEMS:    unable to assess due to cognitive state    Vital Signs Last 24 Hrs  T(C): 36.7 (08 Jan 2024 05:13), Max: 37.3 (07 Jan 2024 20:19)  T(F): 98 (08 Jan 2024 05:13), Max: 99.1 (07 Jan 2024 20:19)  HR: 97 (08 Jan 2024 05:13) (85 - 109)  BP: 112/65 (08 Jan 2024 05:13) (112/52 - 136/69)  BP(mean): 81 (08 Jan 2024 05:13) (81 - 91)  RR: 18 (08 Jan 2024 05:13) (18 - 18)  SpO2: 93% (08 Jan 2024 05:13) (91% - 93%)    Parameters below as of 08 Jan 2024 05:13  Patient On (Oxygen Delivery Method): room air    ________________________________________________  PHYSICAL EXAM:  GENERAL: NAD  HEENT: Normocephalic;  conjunctivae and sclerae clear; moist mucous membranes;   NECK : supple  CHEST/LUNG: Clear to auscultation bilaterally with good air entry   HEART: S1 S2  regular; no murmurs, gallops or rubs  ABDOMEN: Soft, Nontender, Nondistended; Bowel sounds present  EXTREMITIES: no cyanosis; no edema; no calf tenderness, tenderness to right thigh on palpation  SKIN: warm and dry; no rash  NERVOUS SYSTEM:  Awake and alert; Oriented to self; no new deficits    _________________________________________________  LABS:                        9.3    57.13 )-----------( 387      ( 08 Jan 2024 07:46 )             30.2     01-08    137  |  102  |  16  ----------------------------<  94  3.5   |  26  |  0.99    Ca    11.2<H>      08 Jan 2024 07:46  Phos  3.2     01-08  Mg     1.9     01-08    TPro  6.2  /  Alb  1.6<L>  /  TBili  1.0  /  DBili  x   /  AST  22  /  ALT  23  /  AlkPhos  163<H>  01-08      Urinalysis Basic - ( 08 Jan 2024 07:46 )    Color: x / Appearance: x / SG: x / pH: x  Gluc: 94 mg/dL / Ketone: x  / Bili: x / Urobili: x   Blood: x / Protein: x / Nitrite: x   Leuk Esterase: x / RBC: x / WBC x   Sq Epi: x / Non Sq Epi: x / Bacteria: x      RADIOLOGY & ADDITIONAL TESTS:  < from: CT Abdomen and Pelvis w/ IV Cont (01.07.24 @ 13:41) >  PROCEDURE DATE:  01/07/2024          INTERPRETATION:  CLINICAL INFORMATION: Right leg malignancy.    COMPARISON: Outside contrast-enhanced CT of the thorax, abdomen, and   pelvis November 6, 2023.    CONTRAST/COMPLICATIONS:  IV Contrast: Omnipaque 350 (accession [id number]), IV contrast documented   in unlinked concurrent exam (accession [id number])90 cc administered   10   cc discarded  Oral Contrast: NONE  Complications: None reported at time of study completion    PROCEDURE:  CT of the Chest, Abdomen and Pelvis was performed.  Sagittal and coronal reformats were performed.    FINDINGS:  CHEST:  LUNGS AND LARGE AIRWAYS: Patent central airways. There is been interval   increase in size and number of numerous centrally necrotic lung masses   measuring up to 3.7 cm in the left lower lobe compatible with metastatic   disease. Mild right lowerlobe dependent atelectasis.  PLEURA: Trace bilateral layering pleural effusions, right greater than   left.  VESSELS: Within normal limits.  HEART: Heart size is normal. No pericardial effusion.  MEDIASTINUM AND REMY: No lymphadenopathy.  CHEST WALL AND LOWER NECK: Within normal limits.    ABDOMEN AND PELVIS:  LIVER: Within normal limits.  BILE DUCTS: Normal caliber.  GALLBLADDER: Within normal limits.  SPLEEN: Within normal limits.  PANCREAS: Within normal limits.  ADRENALS: Within normal limits.  KIDNEYS/URETERS: Left renal cysts. Otherwise, within normal limits.    BLADDER: Within normal limits.  REPRODUCTIVE ORGANS: The prostate is not enlarged.    BOWEL: Colonic diverticulosis. No bowel obstruction. Appendix is normal.   There is no mesenteric adenopathy.  PERITONEUM: No ascites.  VESSELS: Within normal limits.  RETROPERITONEUM/LYMPH NODES: No lymphadenopathy.  ABDOMINAL WALL: Small fat-containing left inguinal hernia.  BONES: Mild anterolisthesis of L4 on L5. Degenerative disc disease at   L5-S1.. There is been interval increase in size of an incompletely   visualized peripherally enhancing mass in the right medial thigh.    IMPRESSION: Extensive progressive metastatic burden to the lungs.    --- End of Report ---          < end of copied text >  < from: Xray Knee 3 Views, Left (01.04.24 @ 17:31) >  PROCEDURE DATE:  01/04/2024          INTERPRETATION:  Left knee, pelvis, and chest. Patient had a fall.    Left knee. 2 views.    No effusion. Quite advanced degeneration. No fracture. Some arterial   calcification.    Pelvis.    There is slight symmetric spurring of theouter acetabular corners. There   is degenerative loss of disc height concentrated on the right at L4-5. No   fracture.    Right abdominal calcifications could be and lymph nodes.    AP chest on January 4, 2024 at 5:05 PM.    Heart magnified by technique.    There is a left base mass measuring 3.9 cm which can be seen on CAT scan   of November 6, 2023. The present film shows small scattered infiltrates   which appear new since prior.    IMPRESSION: No fracture. Quite advanced left knee degeneration. Stable   left breast mass which on outside CAT scan was largely fatty in nature.    Presently there are small scattered lung infiltrates.    Imaging Personally Reviewed:  YES    Consultant(s) Notes Reviewed:   YES    Care Discussed with Consultants :     Plan of care was discussed with patient and /or primary care giver; all questions and concerns were addressed and care was aligned with patient's wishes.       Patient is a 83y old  Male who presents with a chief complaint of frequent falls, weakness (08 Jan 2024 10:22)      INTERVAL HPI/OVERNIGHT EVENTS: pt seen at bedside, he is confused but appears comfortable.    MEDICATIONS  (STANDING):  aspirin  chewable 81 milliGRAM(s) Oral daily  dextrose 5%. 1000 milliLiter(s) (80 mL/Hr) IV Continuous <Continuous>  donepezil 5 milliGRAM(s) Oral at bedtime  melatonin 3 milliGRAM(s) Oral at bedtime  sodium chloride 0.9%. 1000 milliLiter(s) (100 mL/Hr) IV Continuous <Continuous>  tamsulosin 0.4 milliGRAM(s) Oral at bedtime    MEDICATIONS  (PRN):  __________________________________________________  REVIEW OF SYSTEMS:    unable to assess due to cognitive state    Vital Signs Last 24 Hrs  T(C): 36.7 (08 Jan 2024 05:13), Max: 37.3 (07 Jan 2024 20:19)  T(F): 98 (08 Jan 2024 05:13), Max: 99.1 (07 Jan 2024 20:19)  HR: 97 (08 Jan 2024 05:13) (85 - 109)  BP: 112/65 (08 Jan 2024 05:13) (112/52 - 136/69)  BP(mean): 81 (08 Jan 2024 05:13) (81 - 91)  RR: 18 (08 Jan 2024 05:13) (18 - 18)  SpO2: 93% (08 Jan 2024 05:13) (91% - 93%)    Parameters below as of 08 Jan 2024 05:13  Patient On (Oxygen Delivery Method): room air    ________________________________________________  PHYSICAL EXAM:  GENERAL: NAD  HEENT: Normocephalic;  conjunctivae and sclerae clear; moist mucous membranes;   NECK : supple  CHEST/LUNG: Clear to auscultation bilaterally with good air entry   HEART: S1 S2  regular; no murmurs, gallops or rubs  ABDOMEN: Soft, Nontender, Nondistended; Bowel sounds present  EXTREMITIES: no cyanosis; no edema; no calf tenderness, tenderness to right thigh on palpation  SKIN: warm and dry; no rash  NERVOUS SYSTEM:  Awake and alert; Oriented to self; no new deficits    _________________________________________________  LABS:                        9.3    57.13 )-----------( 387      ( 08 Jan 2024 07:46 )             30.2     01-08    137  |  102  |  16  ----------------------------<  94  3.5   |  26  |  0.99    Ca    11.2<H>      08 Jan 2024 07:46  Phos  3.2     01-08  Mg     1.9     01-08    TPro  6.2  /  Alb  1.6<L>  /  TBili  1.0  /  DBili  x   /  AST  22  /  ALT  23  /  AlkPhos  163<H>  01-08      Urinalysis Basic - ( 08 Jan 2024 07:46 )    Color: x / Appearance: x / SG: x / pH: x  Gluc: 94 mg/dL / Ketone: x  / Bili: x / Urobili: x   Blood: x / Protein: x / Nitrite: x   Leuk Esterase: x / RBC: x / WBC x   Sq Epi: x / Non Sq Epi: x / Bacteria: x      RADIOLOGY & ADDITIONAL TESTS:  < from: CT Abdomen and Pelvis w/ IV Cont (01.07.24 @ 13:41) >  PROCEDURE DATE:  01/07/2024          INTERPRETATION:  CLINICAL INFORMATION: Right leg malignancy.    COMPARISON: Outside contrast-enhanced CT of the thorax, abdomen, and   pelvis November 6, 2023.    CONTRAST/COMPLICATIONS:  IV Contrast: Omnipaque 350 (accession [id number]), IV contrast documented   in unlinked concurrent exam (accession [id number] cc administered   10   cc discarded  Oral Contrast: NONE  Complications: None reported at time of study completion    PROCEDURE:  CT of the Chest, Abdomen and Pelvis was performed.  Sagittal and coronal reformats were performed.    FINDINGS:  CHEST:  LUNGS AND LARGE AIRWAYS: Patent central airways. There is been interval   increase in size and number of numerous centrally necrotic lung masses   measuring up to 3.7 cm in the left lower lobe compatible with metastatic   disease. Mild right lowerlobe dependent atelectasis.  PLEURA: Trace bilateral layering pleural effusions, right greater than   left.  VESSELS: Within normal limits.  HEART: Heart size is normal. No pericardial effusion.  MEDIASTINUM AND REMY: No lymphadenopathy.  CHEST WALL AND LOWER NECK: Within normal limits.    ABDOMEN AND PELVIS:  LIVER: Within normal limits.  BILE DUCTS: Normal caliber.  GALLBLADDER: Within normal limits.  SPLEEN: Within normal limits.  PANCREAS: Within normal limits.  ADRENALS: Within normal limits.  KIDNEYS/URETERS: Left renal cysts. Otherwise, within normal limits.    BLADDER: Within normal limits.  REPRODUCTIVE ORGANS: The prostate is not enlarged.    BOWEL: Colonic diverticulosis. No bowel obstruction. Appendix is normal.   There is no mesenteric adenopathy.  PERITONEUM: No ascites.  VESSELS: Within normal limits.  RETROPERITONEUM/LYMPH NODES: No lymphadenopathy.  ABDOMINAL WALL: Small fat-containing left inguinal hernia.  BONES: Mild anterolisthesis of L4 on L5. Degenerative disc disease at   L5-S1.. There is been interval increase in size of an incompletely   visualized peripherally enhancing mass in the right medial thigh.    IMPRESSION: Extensive progressive metastatic burden to the lungs.    --- End of Report ---          < end of copied text >  < from: Xray Knee 3 Views, Left (01.04.24 @ 17:31) >  PROCEDURE DATE:  01/04/2024          INTERPRETATION:  Left knee, pelvis, and chest. Patient had a fall.    Left knee. 2 views.    No effusion. Quite advanced degeneration. No fracture. Some arterial   calcification.    Pelvis.    There is slight symmetric spurring of theouter acetabular corners. There   is degenerative loss of disc height concentrated on the right at L4-5. No   fracture.    Right abdominal calcifications could be and lymph nodes.    AP chest on January 4, 2024 at 5:05 PM.    Heart magnified by technique.    There is a left base mass measuring 3.9 cm which can be seen on CAT scan   of November 6, 2023. The present film shows small scattered infiltrates   which appear new since prior.    IMPRESSION: No fracture. Quite advanced left knee degeneration. Stable   left breast mass which on outside CAT scan was largely fatty in nature.    Presently there are small scattered lung infiltrates.    Imaging Personally Reviewed:  YES    Consultant(s) Notes Reviewed:   YES    Care Discussed with Consultants :     Plan of care was discussed with patient and /or primary care giver; all questions and concerns were addressed and care was aligned with patient's wishes.

## 2024-01-08 NOTE — PROGRESS NOTE ADULT - ASSESSMENT
Pt is a 83 y.o M w/ PMHx of BPH w/ urinary retention, stroke (2013), emphysema, afib not on ac, dementia, erectile dysfunction, vertigo, leukocytosis of unknown origin (currently being worked up at [facility] Dr. 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 Labs significant for elevated WBC. CTH shows no changes from CTH in 11/2023. Xray show no fractures. CXR shows scattered non-specific pulmonary infiltrates. Admitted for ambulatory dysfunction and malignancy workup Pt is a 83 y.o M w/ PMHx of BPH w/ urinary retention, stroke (2013), emphysema, afib not on ac, dementia, erectile dysfunction, vertigo, leukocytosis of unknown origin (currently being worked up at [facility] Dr. 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 Labs significant for elevated WBC. CTH shows no changes from CTH in 11/2023. Xray show no fractures. CXR shows scattered non-specific pulmonary infiltrates. Admitted for ambulatory dysfunction and malignancy workup

## 2024-01-09 NOTE — PROGRESS NOTE ADULT - PROBLEM SELECTOR PLAN 7
Ca 11.2 likely in setting of malignancy Ca 11.2 likely in setting of malignancy  NS @ 100ml/hr x 10hr  Monitor BMP  Heme/ Onc following

## 2024-01-09 NOTE — PROGRESS NOTE ADULT - SUBJECTIVE AND OBJECTIVE BOX
Patient is a 83y old  Male who presents with a chief complaint of frequent falls, weakness (09 Jan 2024 06:56)      INTERVAL HPI/OVERNIGHT EVENTS: no new complaints    MEDICATIONS  (STANDING):  aspirin  chewable 81 milliGRAM(s) Oral daily  donepezil 5 milliGRAM(s) Oral at bedtime  LORazepam   Injectable 1 milliGRAM(s) IV Push once  melatonin 3 milliGRAM(s) Oral at bedtime  sodium chloride 0.9%. 1000 milliLiter(s) (100 mL/Hr) IV Continuous <Continuous>  sodium chloride 0.9%. 1000 milliLiter(s) (100 mL/Hr) IV Continuous <Continuous>  sodium chloride 0.9%. 1000 milliLiter(s) (100 mL/Hr) IV Continuous <Continuous>  tamsulosin 0.4 milliGRAM(s) Oral at bedtime    MEDICATIONS  (PRN):      __________________________________________________  REVIEW OF SYSTEMS:    CONSTITUTIONAL: No fever,   EYES: no acute visual disturbances  NECK: No pain or stiffness  RESPIRATORY: No cough; No shortness of breath  CARDIOVASCULAR: No chest pain, no palpitations  GASTROINTESTINAL: No pain. No nausea or vomiting; No diarrhea   NEUROLOGICAL: No headache or numbness, no tremors  MUSCULOSKELETAL: No joint pain, no muscle pain  GENITOURINARY: no dysuria, no frequency, no hesitancy  PSYCHIATRY: no depression , no anxiety  ALL OTHER  ROS negative      Vital Signs Last 24 Hrs  T(C): 36.9 (09 Jan 2024 06:03), Max: 37.2 (08 Jan 2024 21:11)  T(F): 98.4 (09 Jan 2024 06:03), Max: 98.9 (08 Jan 2024 21:11)  HR: 98 (09 Jan 2024 06:03) (77 - 98)  BP: 106/64 (09 Jan 2024 06:03) (106/64 - 108/54)  BP(mean): 78 (09 Jan 2024 06:03) (78 - 78)  RR: 17 (09 Jan 2024 06:03) (16 - 17)  SpO2: 93% (09 Jan 2024 06:03) (93% - 94%)    Parameters below as of 09 Jan 2024 06:03  Patient On (Oxygen Delivery Method): room air        ________________________________________________  PHYSICAL EXAM:  GENERAL: NAD  HEENT: Normocephalic;  conjunctivae and sclerae clear; moist mucous membranes;   NECK : supple  CHEST/LUNG: Clear to auscultation bilaterally with good air entry   HEART: S1 S2  regular; no murmurs, gallops or rubs  ABDOMEN: Soft, Nontender, Nondistended; Bowel sounds present  EXTREMITIES: no cyanosis; no edema; no calf tenderness  SKIN: warm and dry; no rash  NERVOUS SYSTEM:  Awake and alert; Oriented  to place, person and time ; no new deficits    _________________________________________________  LABS:                        8.9    x     )-----------( 356      ( 09 Jan 2024 10:08 )             28.6     01-09    138  |  105  |  19<H>  ----------------------------<  124<H>  3.8   |  27  |  0.90    Ca    11.2<H>      09 Jan 2024 07:02  Phos  3.2     01-08  Mg     1.9     01-08    TPro  6.2  /  Alb  1.6<L>  /  TBili  1.0  /  DBili  x   /  AST  22  /  ALT  23  /  AlkPhos  163<H>  01-08      Urinalysis Basic - ( 09 Jan 2024 07:02 )    Color: x / Appearance: x / SG: x / pH: x  Gluc: 124 mg/dL / Ketone: x  / Bili: x / Urobili: x   Blood: x / Protein: x / Nitrite: x   Leuk Esterase: x / RBC: x / WBC x   Sq Epi: x / Non Sq Epi: x / Bacteria: x      CAPILLARY BLOOD GLUCOSE          RADIOLOGY & ADDITIONAL TESTS:    Imaging Personally Reviewed:  YES/NO    Consultant(s) Notes Reviewed:   YES/ No    Care Discussed with Consultants :     Plan of care was discussed with patient and /or primary care giver; all questions and concerns were addressed and care was aligned with patient's wishes.       Patient is a 83y old  Male who presents with a chief complaint of frequent falls, weakness (09 Jan 2024 06:56)      INTERVAL HPI/OVERNIGHT EVENTS: pt seen at bedside, he is A&Ox1 and c/o pain to right thigh when touched otherwise he appears calm and comfortable in bed.    MEDICATIONS  (STANDING):  aspirin  chewable 81 milliGRAM(s) Oral daily  donepezil 5 milliGRAM(s) Oral at bedtime  LORazepam   Injectable 1 milliGRAM(s) IV Push once  melatonin 3 milliGRAM(s) Oral at bedtime  sodium chloride 0.9%. 1000 milliLiter(s) (100 mL/Hr) IV Continuous <Continuous>  sodium chloride 0.9%. 1000 milliLiter(s) (100 mL/Hr) IV Continuous <Continuous>  sodium chloride 0.9%. 1000 milliLiter(s) (100 mL/Hr) IV Continuous <Continuous>  tamsulosin 0.4 milliGRAM(s) Oral at bedtime    MEDICATIONS  (PRN):      __________________________________________________  REVIEW OF SYSTEMS:    CONSTITUTIONAL: No fever, c/o pain to right thigh with touch or movement -- unable to fully assess due to cognitive state    Vital Signs Last 24 Hrs  T(C): 36.9 (09 Jan 2024 06:03), Max: 37.2 (08 Jan 2024 21:11)  T(F): 98.4 (09 Jan 2024 06:03), Max: 98.9 (08 Jan 2024 21:11)  HR: 98 (09 Jan 2024 06:03) (77 - 98)  BP: 106/64 (09 Jan 2024 06:03) (106/64 - 108/54)  BP(mean): 78 (09 Jan 2024 06:03) (78 - 78)  RR: 17 (09 Jan 2024 06:03) (16 - 17)  SpO2: 93% (09 Jan 2024 06:03) (93% - 94%)    Parameters below as of 09 Jan 2024 06:03  Patient On (Oxygen Delivery Method): room air    ________________________________________________  PHYSICAL EXAM:  GENERAL: NAD  HEENT: Normocephalic;  conjunctivae and sclerae clear; moist mucous membranes;   NECK : supple  CHEST/LUNG: Clear to auscultation bilaterally with good air entry   HEART: S1 S2  regular; no murmurs, gallops or rubs  ABDOMEN: Soft, Nontender, Nondistended; Bowel sounds present  EXTREMITIES: no cyanosis; no edema; no calf tenderness- mass on right medial thigh  SKIN: warm and dry; no rash  NERVOUS SYSTEM:  Awake and alert; Oriented to person ; no new deficits    _________________________________________________  LABS:                        8.9    x     )-----------( 356      ( 09 Jan 2024 10:08 )             28.6     01-09    138  |  105  |  19<H>  ----------------------------<  124<H>  3.8   |  27  |  0.90    Ca    11.2<H>      09 Jan 2024 07:02  Phos  3.2     01-08  Mg     1.9     01-08    TPro  6.2  /  Alb  1.6<L>  /  TBili  1.0  /  DBili  x   /  AST  22  /  ALT  23  /  AlkPhos  163<H>  01-08      Urinalysis Basic - ( 09 Jan 2024 07:02 )    Color: x / Appearance: x / SG: x / pH: x  Gluc: 124 mg/dL / Ketone: x  / Bili: x / Urobili: x   Blood: x / Protein: x / Nitrite: x   Leuk Esterase: x / RBC: x / WBC x   Sq Epi: x / Non Sq Epi: x / Bacteria: x    RADIOLOGY & ADDITIONAL TESTS:  < from: CT Abdomen and Pelvis w/ IV Cont (01.07.24 @ 13:41) >  PROCEDURE DATE:  01/07/2024          INTERPRETATION:  CLINICAL INFORMATION: Right leg malignancy.    COMPARISON: Outside contrast-enhanced CT of the thorax, abdomen, and   pelvis November 6, 2023.    CONTRAST/COMPLICATIONS:  IV Contrast: Omnipaque 350 (accession [id number]), IV contrast documented   in unlinked concurrent exam (accession [id number])90 cc administered   10   cc discarded  Oral Contrast: NONE  Complications: None reported at time of study completion    PROCEDURE:  CT of the Chest, Abdomen and Pelvis was performed.  Sagittal and coronal reformats were performed.    FINDINGS:  CHEST:  LUNGS AND LARGE AIRWAYS: Patent central airways. There is been interval   increase in size and number of numerous centrally necrotic lung masses   measuring up to 3.7 cm in the left lower lobe compatible with metastatic   disease. Mild right lowerlobe dependent atelectasis.  PLEURA: Trace bilateral layering pleural effusions, right greater than   left.  VESSELS: Within normal limits.  HEART: Heart size is normal. No pericardial effusion.  MEDIASTINUM AND REMY: No lymphadenopathy.  CHEST WALL AND LOWER NECK: Within normal limits.    ABDOMEN AND PELVIS:  LIVER: Within normal limits.  BILE DUCTS: Normal caliber.  GALLBLADDER: Within normal limits.  SPLEEN: Within normal limits.  PANCREAS: Within normal limits.  ADRENALS: Within normal limits.  KIDNEYS/URETERS: Left renal cysts. Otherwise, within normal limits.    BLADDER: Within normal limits.  REPRODUCTIVE ORGANS: The prostate is not enlarged.    BOWEL: Colonic diverticulosis. No bowel obstruction. Appendix is normal.   There is no mesenteric adenopathy.  PERITONEUM: No ascites.  VESSELS: Within normal limits.  RETROPERITONEUM/LYMPH NODES: No lymphadenopathy.  ABDOMINAL WALL: Small fat-containing left inguinal hernia.  BONES: Mild anterolisthesis of L4 on L5. Degenerative disc disease at   L5-S1.. There is been interval increase in size of an incompletely   visualized peripherally enhancing mass in the right medial thigh.    IMPRESSION: Extensive progressive metastatic burden to the lungs.    --- End of Report ---    < from: CT Chest w/ IV Cont (01.07.24 @ 13:40) >  PROCEDURE DATE:  01/07/2024        INTERPRETATION:  CLINICAL INFORMATION: Right leg malignancy.    COMPARISON: Outside contrast-enhanced CT of the thorax, abdomen, and   pelvis November 6, 2023.    CONTRAST/COMPLICATIONS:  IV Contrast: Omnipaque 350 (accession [id number]), IV contrast documented   in unlinked concurrent exam (accession [id number])90 cc administered   10   cc discarded  Oral Contrast: NONE  Complications: None reported at time of study completion    PROCEDURE:  CT of the Chest, Abdomen and Pelvis was performed.  Sagittal and coronal reformats were performed.    FINDINGS:  CHEST:  LUNGS AND LARGE AIRWAYS: Patent central airways. There is been interval   increase in size and number of numerous centrally necrotic lung masses   measuring up to 3.7 cm in the left lower lobe compatible with metastatic   disease. Mild right lowerlobe dependent atelectasis.  PLEURA: Trace bilateral layering pleural effusions, right greater than   left.  VESSELS: Within normal limits.  HEART: Heart size is normal. No pericardial effusion.  MEDIASTINUM AND REMY: No lymphadenopathy.  CHEST WALL AND LOWER NECK: Within normal limits.    ABDOMEN AND PELVIS:  LIVER: Within normal limits.  BILE DUCTS: Normal caliber.  GALLBLADDER: Within normal limits.  SPLEEN: Within normal limits.  PANCREAS: Within normal limits.  ADRENALS: Within normal limits.  KIDNEYS/URETERS: Left renal cysts. Otherwise, within normal limits.    BLADDER: Within normal limits.  REPRODUCTIVE ORGANS: The prostate is not enlarged.    BOWEL: Colonic diverticulosis. No bowel obstruction. Appendix is normal.   There is no mesenteric adenopathy.  PERITONEUM: No ascites.  VESSELS: Within normal limits.  RETROPERITONEUM/LYMPH NODES: No lymphadenopathy.  ABDOMINAL WALL: Small fat-containing left inguinal hernia.  BONES: Mild anterolisthesis of L4 on L5. Degenerative disc disease at   L5-S1.. There is been interval increase in size of an incompletely   visualized peripherally enhancing mass in the right medial thigh.    IMPRESSION: Extensive progressive metastatic burden to the lungs.    --- End of Report ---    Imaging Personally Reviewed:  YES    Consultant(s) Notes Reviewed:   YES    Plan of care was discussed with patient and /or primary care giver; all questions and concerns were addressed and care was aligned with patient's wishes.       Patient is a 83y old  Male who presents with a chief complaint of frequent falls, weakness (09 Jan 2024 06:56)      INTERVAL HPI/OVERNIGHT EVENTS: pt seen at bedside, he is A&Ox1 and c/o pain to right thigh when touched otherwise he appears calm and comfortable in bed.    MEDICATIONS  (STANDING):  aspirin  chewable 81 milliGRAM(s) Oral daily  donepezil 5 milliGRAM(s) Oral at bedtime  LORazepam   Injectable 1 milliGRAM(s) IV Push once  melatonin 3 milliGRAM(s) Oral at bedtime  sodium chloride 0.9%. 1000 milliLiter(s) (100 mL/Hr) IV Continuous <Continuous>  sodium chloride 0.9%. 1000 milliLiter(s) (100 mL/Hr) IV Continuous <Continuous>  sodium chloride 0.9%. 1000 milliLiter(s) (100 mL/Hr) IV Continuous <Continuous>  tamsulosin 0.4 milliGRAM(s) Oral at bedtime    MEDICATIONS  (PRN):      __________________________________________________  REVIEW OF SYSTEMS:    CONSTITUTIONAL: No fever, c/o pain to right thigh with touch or movement -- unable to fully assess due to cognitive state    Vital Signs Last 24 Hrs  T(C): 36.9 (09 Jan 2024 06:03), Max: 37.2 (08 Jan 2024 21:11)  T(F): 98.4 (09 Jan 2024 06:03), Max: 98.9 (08 Jan 2024 21:11)  HR: 98 (09 Jan 2024 06:03) (77 - 98)  BP: 106/64 (09 Jan 2024 06:03) (106/64 - 108/54)  BP(mean): 78 (09 Jan 2024 06:03) (78 - 78)  RR: 17 (09 Jan 2024 06:03) (16 - 17)  SpO2: 93% (09 Jan 2024 06:03) (93% - 94%)    Parameters below as of 09 Jan 2024 06:03  Patient On (Oxygen Delivery Method): room air    ________________________________________________  PHYSICAL EXAM:  GENERAL: NAD  HEENT: Normocephalic;  conjunctivae and sclerae clear; moist mucous membranes;   NECK : supple  CHEST/LUNG: Clear to auscultation bilaterally with good air entry   HEART: S1 S2  regular; no murmurs, gallops or rubs  ABDOMEN: Soft, Nontender, Nondistended; Bowel sounds present  EXTREMITIES: no cyanosis; no edema; no calf tenderness- mass on right medial thigh  SKIN: warm and dry; no rash  NERVOUS SYSTEM:  Awake and alert; Oriented to person ; no new deficits    _________________________________________________  LABS:                        8.9    x     )-----------( 356      ( 09 Jan 2024 10:08 )             28.6     01-09    138  |  105  |  19<H>  ----------------------------<  124<H>  3.8   |  27  |  0.90    Ca    11.2<H>      09 Jan 2024 07:02  Phos  3.2     01-08  Mg     1.9     01-08    TPro  6.2  /  Alb  1.6<L>  /  TBili  1.0  /  DBili  x   /  AST  22  /  ALT  23  /  AlkPhos  163<H>  01-08      Urinalysis Basic - ( 09 Jan 2024 07:02 )    Color: x / Appearance: x / SG: x / pH: x  Gluc: 124 mg/dL / Ketone: x  / Bili: x / Urobili: x   Blood: x / Protein: x / Nitrite: x   Leuk Esterase: x / RBC: x / WBC x   Sq Epi: x / Non Sq Epi: x / Bacteria: x    RADIOLOGY & ADDITIONAL TESTS:  < from: CT Abdomen and Pelvis w/ IV Cont (01.07.24 @ 13:41) >  PROCEDURE DATE:  01/07/2024          INTERPRETATION:  CLINICAL INFORMATION: Right leg malignancy.    COMPARISON: Outside contrast-enhanced CT of the thorax, abdomen, and   pelvis November 6, 2023.    CONTRAST/COMPLICATIONS:  IV Contrast: Omnipaque 350 (accession [id number]), IV contrast documented   in unlinked concurrent exam (accession [id number] cc administered   10   cc discarded  Oral Contrast: NONE  Complications: None reported at time of study completion    PROCEDURE:  CT of the Chest, Abdomen and Pelvis was performed.  Sagittal and coronal reformats were performed.    FINDINGS:  CHEST:  LUNGS AND LARGE AIRWAYS: Patent central airways. There is been interval   increase in size and number of numerous centrally necrotic lung masses   measuring up to 3.7 cm in the left lower lobe compatible with metastatic   disease. Mild right lowerlobe dependent atelectasis.  PLEURA: Trace bilateral layering pleural effusions, right greater than   left.  VESSELS: Within normal limits.  HEART: Heart size is normal. No pericardial effusion.  MEDIASTINUM AND REMY: No lymphadenopathy.  CHEST WALL AND LOWER NECK: Within normal limits.    ABDOMEN AND PELVIS:  LIVER: Within normal limits.  BILE DUCTS: Normal caliber.  GALLBLADDER: Within normal limits.  SPLEEN: Within normal limits.  PANCREAS: Within normal limits.  ADRENALS: Within normal limits.  KIDNEYS/URETERS: Left renal cysts. Otherwise, within normal limits.    BLADDER: Within normal limits.  REPRODUCTIVE ORGANS: The prostate is not enlarged.    BOWEL: Colonic diverticulosis. No bowel obstruction. Appendix is normal.   There is no mesenteric adenopathy.  PERITONEUM: No ascites.  VESSELS: Within normal limits.  RETROPERITONEUM/LYMPH NODES: No lymphadenopathy.  ABDOMINAL WALL: Small fat-containing left inguinal hernia.  BONES: Mild anterolisthesis of L4 on L5. Degenerative disc disease at   L5-S1.. There is been interval increase in size of an incompletely   visualized peripherally enhancing mass in the right medial thigh.    IMPRESSION: Extensive progressive metastatic burden to the lungs.    --- End of Report ---    < from: CT Chest w/ IV Cont (01.07.24 @ 13:40) >  PROCEDURE DATE:  01/07/2024        INTERPRETATION:  CLINICAL INFORMATION: Right leg malignancy.    COMPARISON: Outside contrast-enhanced CT of the thorax, abdomen, and   pelvis November 6, 2023.    CONTRAST/COMPLICATIONS:  IV Contrast: Omnipaque 350 (accession [id number]), IV contrast documented   in unlinked concurrent exam (accession [id number])90 cc administered   10   cc discarded  Oral Contrast: NONE  Complications: None reported at time of study completion    PROCEDURE:  CT of the Chest, Abdomen and Pelvis was performed.  Sagittal and coronal reformats were performed.    FINDINGS:  CHEST:  LUNGS AND LARGE AIRWAYS: Patent central airways. There is been interval   increase in size and number of numerous centrally necrotic lung masses   measuring up to 3.7 cm in the left lower lobe compatible with metastatic   disease. Mild right lowerlobe dependent atelectasis.  PLEURA: Trace bilateral layering pleural effusions, right greater than   left.  VESSELS: Within normal limits.  HEART: Heart size is normal. No pericardial effusion.  MEDIASTINUM AND REMY: No lymphadenopathy.  CHEST WALL AND LOWER NECK: Within normal limits.    ABDOMEN AND PELVIS:  LIVER: Within normal limits.  BILE DUCTS: Normal caliber.  GALLBLADDER: Within normal limits.  SPLEEN: Within normal limits.  PANCREAS: Within normal limits.  ADRENALS: Within normal limits.  KIDNEYS/URETERS: Left renal cysts. Otherwise, within normal limits.    BLADDER: Within normal limits.  REPRODUCTIVE ORGANS: The prostate is not enlarged.    BOWEL: Colonic diverticulosis. No bowel obstruction. Appendix is normal.   There is no mesenteric adenopathy.  PERITONEUM: No ascites.  VESSELS: Within normal limits.  RETROPERITONEUM/LYMPH NODES: No lymphadenopathy.  ABDOMINAL WALL: Small fat-containing left inguinal hernia.  BONES: Mild anterolisthesis of L4 on L5. Degenerative disc disease at   L5-S1.. There is been interval increase in size of an incompletely   visualized peripherally enhancing mass in the right medial thigh.    IMPRESSION: Extensive progressive metastatic burden to the lungs.    --- End of Report ---    Imaging Personally Reviewed:  YES    Consultant(s) Notes Reviewed:   YES    Plan of care was discussed with patient and /or primary care giver; all questions and concerns were addressed and care was aligned with patient's wishes.

## 2024-01-09 NOTE — PROGRESS NOTE ADULT - ASSESSMENT
Pt is a 83 y.o M w/ PMHx of BPH w/ urinary retention, stroke (2013), emphysema, afib not on ac, dementia, erectile dysfunction, vertigo, leukocytosis of unknown origin (currently being worked up at [facility] Dr. 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 Labs significant for elevated WBC. CTH shows no changes from CTH in 11/2023. Xray show no fractures. CXR shows scattered non-specific pulmonary infiltrates. Admitted for ambulatory dysfunction and malignancy workup

## 2024-01-09 NOTE — PROGRESS NOTE ADULT - SUBJECTIVE AND OBJECTIVE BOX
CHIEF COMPLAINT  Falls    HISTORY OF PRESENT ILLNESS  NIKHIL CASTRO is a 83y Male who presents with a chief complaint of falls    No acute events. No complaints.    REVIEW OF SYSTEMS  A complete review of systems was performed; negative except per HPI    PHYSICAL EXAM  T(C): 36.9 (01-09-24 @ 06:03), Max: 37.2 (01-08-24 @ 21:11)  HR: 98 (01-09-24 @ 06:03) (77 - 98)  BP: 106/64 (01-09-24 @ 06:03) (106/64 - 108/54)  RR: 17 (01-09-24 @ 06:03) (16 - 17)  SpO2: 93% (01-09-24 @ 06:03) (93% - 94%)  Constitutional: alert, awake, in no acute distress  Eyes: PERRL, EOMI  HEENT: normocephalic, atraumatic  Neck: supple, non-tender  Cardiovascular: normal perfusion, tachycardic  Respiratory: normal respiratory efforts; no increased use of accessory muscles  Gastrointestinal: soft, non-tender  Musculoskeletal: normal range of motion, no deformities noted  Neurological: alert, CN II to XI grossly intact  Skin: warm, dry    LABORATORY DATA                        9.3    57.13 )-----------( 387      ( 08 Jan 2024 07:46 )             30.2     01-08    137  |  102  |  16  ----------------------------<  94  3.5   |  26  |  0.99    Ca    11.2<H>      08 Jan 2024 07:46  Phos  3.2     01-08  Mg     1.9     01-08    TPro  6.2  /  Alb  1.6<L>  /  TBili  1.0  /  DBili  x   /  AST  22  /  ALT  23  /  AlkPhos  163<H>  01-08    RADIOLOGY REVIEW  IMPRESSION: Extensive progressive metastatic burden to the lungs.   CHIEF COMPLAINT  Falls    HISTORY OF PRESENT ILLNESS  NIKHIL CASTRO is a 83y Male who presents with a chief complaint of falls    No acute events.     REVIEW OF SYSTEMS  Unable - mental status    PHYSICAL EXAM  T(C): 36.9 (01-09-24 @ 06:03), Max: 37.2 (01-08-24 @ 21:11)  HR: 98 (01-09-24 @ 06:03) (77 - 98)  BP: 106/64 (01-09-24 @ 06:03) (106/64 - 108/54)  RR: 17 (01-09-24 @ 06:03) (16 - 17)  SpO2: 93% (01-09-24 @ 06:03) (93% - 94%)  Constitutional: lethargic, in no acute distress  Eyes: PERRL, EOMI  HEENT: normocephalic, atraumatic  Neck: supple, non-tender  Cardiovascular: normal perfusion, tachycardic  Respiratory: normal respiratory efforts; no increased use of accessory muscles  Gastrointestinal: soft, non-tender  Musculoskeletal: normal range of motion, no deformities noted  Skin: warm, dry    LABORATORY DATA                        9.3    57.13 )-----------( 387      ( 08 Jan 2024 07:46 )             30.2     01-08    137  |  102  |  16  ----------------------------<  94  3.5   |  26  |  0.99    Ca    11.2<H>      08 Jan 2024 07:46  Phos  3.2     01-08  Mg     1.9     01-08    TPro  6.2  /  Alb  1.6<L>  /  TBili  1.0  /  DBili  x   /  AST  22  /  ALT  23  /  AlkPhos  163<H>  01-08    RADIOLOGY REVIEW  IMPRESSION: Extensive progressive metastatic burden to the lungs.

## 2024-01-09 NOTE — PROGRESS NOTE ADULT - ASSESSMENT
NIKHIL CASTRO is a 83y Male who presents with a chief complaint of falls    Metastatic Malignancy  ·	Patient with known right upper thigh mass. Imaging also showed metastatic burden in the lungs.  ·	Concern for sarcoma. Patient may also have a prostate primary given elevated PSA (though < 10) with recent MRI of the prostate PIRADS-5.   ·	Patient is not a surgical candidate for excision.  ·	Interventional radiology consultation for biopsy. Will follow-up on pathology.  ·	Urology recommendations noted; no plans for inpatient workup or procedures at this time.    Leukocytosis  ·	Bone marrow biopsy in November 2023 was unremarkable with no evidence of hematological malignancy.  ·	Likely reactive to ongoing malignancy or inflammation.  ·	Rule out infections.  ·	Continue to monitor and trend.    Anemia  ·	In the setting of malignancy, inflammation. Ferritin very elevated.  ·	Will follow-up on remaining labs.   ·	Monitor and maintain above 7.    Will continue to follow.    Loco Marshall MD  Hematology/Oncology  O: [phone]   NIKHIL CASTRO is a 83y Male who presents with a chief complaint of falls    Metastatic Malignancy  ·	Patient with known right upper thigh mass. Imaging also showed metastatic burden in the lungs.  ·	Concern for sarcoma. Patient may also have a prostate primary given elevated PSA (though < 10) with recent MRI of the prostate PIRADS-5.   ·	Patient is not a surgical candidate for excision.  ·	Interventional radiology consultation for biopsy. Will follow-up on pathology.  ·	Urology recommendations noted; no plans for inpatient workup or procedures at this time.    Leukocytosis  ·	Bone marrow biopsy in November 2023 was unremarkable with no evidence of hematological malignancy.  ·	Likely reactive to ongoing malignancy or inflammation.  ·	Rule out infections.  ·	Continue to monitor and trend.    Anemia  ·	In the setting of malignancy, inflammation. Ferritin very elevated.  ·	Will follow-up on remaining labs.   ·	Monitor and maintain above 7.    Will continue to follow.    Loco Marshall MD  Hematology/Oncology  O: [phone]

## 2024-01-09 NOTE — PROGRESS NOTE ADULT - SUBJECTIVE AND OBJECTIVE BOX
DATE OF SERVICE: 01-09-24    Patient denies chest pain or shortness of breath.   Review of symptoms otherwise negative.    aspirin  chewable 81 milliGRAM(s) Oral daily  donepezil 5 milliGRAM(s) Oral at bedtime  LORazepam   Injectable 1 milliGRAM(s) IV Push once  melatonin 3 milliGRAM(s) Oral at bedtime  sodium chloride 0.9%. 1000 milliLiter(s) IV Continuous <Continuous>  sodium chloride 0.9%. 1000 milliLiter(s) IV Continuous <Continuous>  sodium chloride 0.9%. 1000 milliLiter(s) IV Continuous <Continuous>  tamsulosin 0.4 milliGRAM(s) Oral at bedtime                            8.9    55.32 )-----------( 356      ( 09 Jan 2024 10:08 )             28.6       Hemoglobin: 8.9 g/dL (01-09 @ 10:08)  Hemoglobin: 8.4 g/dL (01-09 @ 07:02)  Hemoglobin: 9.3 g/dL (01-08 @ 07:46)  Hemoglobin: 8.4 g/dL (01-07 @ 07:25)  Hemoglobin: 8.6 g/dL (01-06 @ 07:35)      01-09    138  |  105  |  19<H>  ----------------------------<  124<H>  3.8   |  27  |  0.90    Ca    11.2<H>      09 Jan 2024 07:02  Phos  3.2     01-08  Mg     1.9     01-08    TPro  6.2  /  Alb  1.6<L>  /  TBili  1.0  /  DBili  x   /  AST  22  /  ALT  23  /  AlkPhos  163<H>  01-08    Creatinine Trend: 0.90<--, 0.99<--, 0.81<--, 0.79<--, 0.95<--    COAGS:           T(C): 36.9 (01-09-24 @ 06:03), Max: 37.2 (01-08-24 @ 21:11)  HR: 98 (01-09-24 @ 06:03) (77 - 98)  BP: 106/64 (01-09-24 @ 06:03) (106/64 - 108/54)  RR: 17 (01-09-24 @ 06:03) (16 - 17)  SpO2: 93% (01-09-24 @ 06:03) (93% - 94%)  Wt(kg): --    I&O's Summary      HEENT:  (-)icterus (-)pallor  CV: N S1 S2 1/6 DANNY (+)2 Pulses B/l  Resp:  Clear to ausculatation B/L, normal effort  GI: (+) BS Soft, NT, ND  Lymph:  (-)Edema, (-)obvious lymphadenopathy  Skin: Warm to touch, Normal turgor  Psych: Appropriate mood and affect    ASSESSMENT/PLAN: 	83y Male  PMHx of BPH w/ urinary retention, stroke (2013), HTN, DM, afib not on ac, dementia, erectile dysfunction, vertigo, leukocytosis of unknown origin (currently being worked up at [facility] Dr. 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    # Abnormal EKG  - Appear to have wondering atrial pacemaker  - tachy overnight, no complaints of palpitation   - ECHO noted , normal LV fx  - No need for further inpatient cardiac work up.      # Fall  - No evidence of LOC    # Leukocytosis  - Heme/onc f/u  - Surgery f/u noted        Fredi Wilson MD, [facility]  BEEPER [phone]     DATE OF SERVICE: 01-09-24    Patient denies chest pain or shortness of breath.   Review of symptoms otherwise negative.    aspirin  chewable 81 milliGRAM(s) Oral daily  donepezil 5 milliGRAM(s) Oral at bedtime  LORazepam   Injectable 1 milliGRAM(s) IV Push once  melatonin 3 milliGRAM(s) Oral at bedtime  sodium chloride 0.9%. 1000 milliLiter(s) IV Continuous <Continuous>  sodium chloride 0.9%. 1000 milliLiter(s) IV Continuous <Continuous>  sodium chloride 0.9%. 1000 milliLiter(s) IV Continuous <Continuous>  tamsulosin 0.4 milliGRAM(s) Oral at bedtime                            8.9    55.32 )-----------( 356      ( 09 Jan 2024 10:08 )             28.6       Hemoglobin: 8.9 g/dL (01-09 @ 10:08)  Hemoglobin: 8.4 g/dL (01-09 @ 07:02)  Hemoglobin: 9.3 g/dL (01-08 @ 07:46)  Hemoglobin: 8.4 g/dL (01-07 @ 07:25)  Hemoglobin: 8.6 g/dL (01-06 @ 07:35)      01-09    138  |  105  |  19<H>  ----------------------------<  124<H>  3.8   |  27  |  0.90    Ca    11.2<H>      09 Jan 2024 07:02  Phos  3.2     01-08  Mg     1.9     01-08    TPro  6.2  /  Alb  1.6<L>  /  TBili  1.0  /  DBili  x   /  AST  22  /  ALT  23  /  AlkPhos  163<H>  01-08    Creatinine Trend: 0.90<--, 0.99<--, 0.81<--, 0.79<--, 0.95<--    COAGS:           T(C): 36.9 (01-09-24 @ 06:03), Max: 37.2 (01-08-24 @ 21:11)  HR: 98 (01-09-24 @ 06:03) (77 - 98)  BP: 106/64 (01-09-24 @ 06:03) (106/64 - 108/54)  RR: 17 (01-09-24 @ 06:03) (16 - 17)  SpO2: 93% (01-09-24 @ 06:03) (93% - 94%)  Wt(kg): --    I&O's Summary      HEENT:  (-)icterus (-)pallor  CV: N S1 S2 1/6 DANNY (+)2 Pulses B/l  Resp:  Clear to ausculatation B/L, normal effort  GI: (+) BS Soft, NT, ND  Lymph:  (-)Edema, (-)obvious lymphadenopathy  Skin: Warm to touch, Normal turgor  Psych: Appropriate mood and affect    ASSESSMENT/PLAN: 	83y Male  PMHx of BPH w/ urinary retention, stroke (2013), HTN, DM, afib not on ac, dementia, erectile dysfunction, vertigo, leukocytosis of unknown origin (currently being worked up at [facility] Dr. 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    # Abnormal EKG  - Appear to have wondering atrial pacemaker  - tachy overnight, no complaints of palpitation   - ECHO noted , normal LV fx  - No need for further inpatient cardiac work up.      # Fall  - No evidence of LOC    # Leukocytosis  - Heme/onc f/u  - Surgery f/u noted        Fredi Wilson MD, [facility]  BEEPER [phone]

## 2024-01-09 NOTE — PROGRESS NOTE ADULT - NS ATTEND AMEND GEN_ALL_CORE FT
Patient seen at bedside, nonverbal, does not report acute complains.  On exam, patient is AOx0, somnolent, grimaces on painful stimuli, cardiopulmonary exams unremarkable, abdomen soft, NT/ND, Has bilateral knee tenderness.  No labs available for review today.      Assessment and plan:  83 y.o M w/ PMHx of BPH w/ urinary retention, stroke (2013), emphysema, afib not on AC, dementia, erectile dysfunction, vertigo, leukocytosis of unknown origin (currently being worked up at [facility] Dr. 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 Labs significant for elevated WBC. CTH shows no changes from CTH in 11/2023. Xray show no fractures. CXR shows scattered non-specific pulmonary infiltrates. Admitted for ambulatory dysfunction and malignancy workup. Further workup shows multiple nodules in the lungs suggestive of metastases. Oncology consulted, recommending biopsy for further guidance of treatment, but patient is not considered a candidate for conventional chemotherapy given the age, low functional status and comorbidities. Surgery recommends needle core biopsy as the risk of surgical biopsy is deemed to overweigh the benefit. Attempted MRI but unable to obtain the imagings even with ativan as patient became restless.    # 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    # Lung nodules suspicious of malignancy   # Enlarged prostate, concerning for malignancy   # Leukocytosis, likely due to malignancy, ruled out leukemia with BM biopsy    # Anemia   # Delirium, possible hospital onset delirium; unlikely infection, CT head neg, electrolytes stable- resolved   # Hypercalcemia of malignancy   # Renal complex mass   # Abnormal EKG, appearing  2/2 wondering atrial pacemaker   # Atrial fibrillation not on AC   # hx of CVA, cerebellar infarct    # Emphysema    # Severe protein calorie malnutrition     - heme/onc recs appreciated   - consulting palliative care service for GOC discussion given the course above  - NS 100cc/h for hyperCa, repeat BMP tomorrow   - DVT ppx: Lovenox Patient seen at bedside, nonverbal, does not report acute complains.  On exam, patient is AOx0, somnolent, grimaces on painful stimuli, cardiopulmonary exams unremarkable, abdomen soft, NT/ND, Has bilateral knee tenderness.  No labs available for review today.      Assessment and plan:  83 y.o M w/ PMHx of BPH w/ urinary retention, stroke (2013), emphysema, afib not on AC, dementia, erectile dysfunction, vertigo, leukocytosis of unknown origin (currently being worked up at [facility] Dr. 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 Labs significant for elevated WBC. CTH shows no changes from CTH in 11/2023. Xray show no fractures. CXR shows scattered non-specific pulmonary infiltrates. Admitted for ambulatory dysfunction and malignancy workup. Further workup shows multiple nodules in the lungs suggestive of metastases. Oncology consulted, recommending biopsy for further guidance of treatment, but patient is not considered a candidate for conventional chemotherapy given the age, low functional status and comorbidities. Surgery recommends needle core biopsy as the risk of surgical biopsy is deemed to overweigh the benefit. Attempted MRI but unable to obtain the imagings even with ativan as patient became restless.    # 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    # Lung nodules suspicious of malignancy   # Enlarged prostate, concerning for malignancy   # Leukocytosis, likely due to malignancy, ruled out leukemia with BM biopsy    # Anemia   # Delirium, possible hospital onset delirium; unlikely infection, CT head neg, electrolytes stable- resolved   # Hypercalcemia of malignancy   # Renal complex mass   # Abnormal EKG, appearing  2/2 wondering atrial pacemaker   # Atrial fibrillation not on AC   # hx of CVA, cerebellar infarct    # Emphysema    # Severe protein calorie malnutrition     - heme/onc recs appreciated   - consulting palliative care service for GOC discussion given the course above  - NS 100cc/h for hyperCa, repeat BMP tomorrow   - DVT ppx: Lovenox Patient seen at bedside, nonverbal, does not report acute complains.  On exam, patient is AOx0, somnolent, grimaces on painful stimuli, cardiopulmonary exams unremarkable, abdomen soft, NT/ND, Has bilateral knee tenderness.  No labs available for review today.      Assessment and plan:  83 y.o M w/ PMHx of BPH w/ urinary retention, stroke (2013), emphysema, afib not on AC, dementia, erectile dysfunction, vertigo, leukocytosis of unknown origin (currently being worked up at [facility] Dr. 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 Labs significant for elevated WBC. CTH shows no changes from CTH in 11/2023. Xray show no fractures. CXR shows scattered non-specific pulmonary infiltrates. Admitted for ambulatory dysfunction and malignancy workup. Further workup shows multiple nodules in the lungs suggestive of metastases. Oncology consulted, recommending biopsy for further guidance of treatment, but patient is not considered a candidate for conventional chemotherapy given the age, low functional status and comorbidities. Surgery recommends needle core biopsy as the risk of surgical biopsy is deemed to overweigh the benefit. Attempted MRI but unable to obtain the imaging even with ativan as patient became restless.    # 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    # Lung nodules suspicious of malignancy   # Enlarged prostate, concerning for malignancy   # Leukocytosis, likely due to malignancy, ruled out leukemia with BM biopsy    # Anemia   # Delirium, possible hospital onset delirium; unlikely infection, CT head neg, electrolytes stable- resolved   # Hypercalcemia of malignancy   # Renal complex mass   # Abnormal EKG, appearing  2/2 wondering atrial pacemaker   # Atrial fibrillation not on AC   # hx of CVA, cerebellar infarct    # Emphysema    # Severe protein calorie malnutrition     - heme/onc recs appreciated   - consulting palliative care service for GOC discussion given the course above  - NS 100cc/h for hyperCa, repeat BMP tomorrow   - DVT ppx: Lovenox Patient seen at bedside, nonverbal, does not report acute complains.  On exam, patient is AOx0, somnolent, grimaces on painful stimuli, cardiopulmonary exams unremarkable, abdomen soft, NT/ND, Has bilateral knee tenderness.  No labs available for review today.      Assessment and plan:  83 y.o M w/ PMHx of BPH w/ urinary retention, stroke (2013), emphysema, afib not on AC, dementia, erectile dysfunction, vertigo, leukocytosis of unknown origin ([facility] being [facility] Dr. 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 Labs significant for elevated WBC. CTH shows no changes from CTH in 11/2023. Xray show no fractures. CXR shows scattered non-specific pulmonary infiltrates. Admitted for ambulatory dysfunction and malignancy workup. Further workup shows multiple nodules in the lungs suggestive of metastases. Oncology consulted, recommending biopsy for further guidance of treatment, but patient is not considered a candidate for conventional chemotherapy given the age, low functional status and comorbidities. Surgery recommends needle core biopsy as the risk of surgical biopsy is deemed to overweigh the benefit. Attempted MRI but unable to obtain the imaging even with ativan as patient became restless.    # 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    # Lung nodules suspicious of malignancy   # Enlarged prostate, concerning for malignancy   # Leukocytosis, likely due to malignancy, ruled out leukemia with BM biopsy    # Anemia   # Delirium, possible hospital onset delirium; unlikely infection, CT head neg, electrolytes stable- resolved   # Hypercalcemia of malignancy   # Renal complex mass   # Abnormal EKG, appearing  2/2 wondering atrial pacemaker   # Atrial fibrillation not on AC   # hx of CVA, cerebellar infarct    # Emphysema    # Severe protein calorie malnutrition     - heme/onc recs appreciated   - consulting palliative care service for GOC discussion given the course above  - NS 100cc/h for hyperCa, repeat BMP tomorrow   - DVT ppx: Lovenox

## 2024-01-09 NOTE — PROGRESS NOTE ADULT - PROBLEM SELECTOR PLAN 5
Leukocytosis with negative outpatient leukemia workup  CT Abd & Pelvis- right thigh mass  CT Chest Metastatic burden lungs  Sx/Onc consulted: not a candidate for excisional biopsy  IR consulted for Core biopsy of thigh recs MRI first  Pt unable to tolerate MRI today despite Ativan prior to test  Palliative consulted  Heme/Onc following

## 2024-01-10 NOTE — CONSULT NOTE ADULT - CONSULT REASON
Abnormal EKG
mass
hx of urinary retention, BPH
Complex medical decision making in the context of presumed metastatic carcinoma and R leg mass with recent functional decline
R thigh mass

## 2024-01-10 NOTE — PROGRESS NOTE ADULT - PROBLEM SELECTOR PLAN 3
WBC 47.92 on admission uptrended to 57  - WBC 45 today  - hem/onc following -- Bone marrow biopsy in November 2023 was unremarkable with no evidence of hematological malignancy. Likely reactive to ongoing malignancy or inflammation.

## 2024-01-10 NOTE — CONSULT NOTE ADULT - ASSESSMENT
83 y.o M w/ PMHx of BPH w/ urinary retention, stroke (2013), HTN, DM, afib not on A/C, dementia (on home Aricept), erectile dysfunction, vertigo, leukocytosis of unknown origin (currently followed by Dr. 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  Initial ER labs were notable for WBC of 47, albumin of 1.7, and hypercalcemia (corrected calcium of 13).  CXR showed scattered small pulmonary infiltrates, no fractures identified on additional x-ray imaging, CT of head was negative for acute intracranial infarct/hemorrhage/mass  (+ minimal anterior periventricular white matter ischemic changes).  Patient was admitted for ambulatory dysfunction, hypercalcemia, and malignancy work-up.  Heme/Onc consulted; per their notes patient with previous BM biopsy in Dec. 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  CT of chest/abd/pelvis (1/7) showed increasing metastatic disease burden to the lungs.  Surgical Oncology consulted, but patient no longer considered a candidate for excisional biopsy of leg mass due to worsening functional/mental status.  IR consulted for possible core biopsy of R thigh mass, but patient unable to tolerate MRI yesterday 1/9 despite sedation with IV Ativan, possible IR mass of lung biopsy now being considered.  Palliative Care service consulted for medical decision making and additional GOC discussion. 83 y.o M w/ PMHx of BPH w/ urinary retention, stroke (2013), HTN, DM, afib not on A/C, dementia (on home Aricept), erectile dysfunction, vertigo, leukocytosis of unknown origin (currently followed by Dr. 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  Initial ER labs were notable for WBC of 47, albumin of 1.7, and hypercalcemia (corrected calcium of 13).  CXR showed scattered small pulmonary infiltrates, no fractures identified on additional x-ray imaging, CT of head was negative for acute intracranial infarct/hemorrhage/mass  (+ minimal anterior periventricular white matter ischemic changes).  Patient was admitted for ambulatory dysfunction, hypercalcemia, and malignancy work-up.  Heme/Onc consulted; per their notes patient with previous BM biopsy in Dec. 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  CT of chest/abd/pelvis (1/7) showed increasing metastatic disease burden to the lungs.  Surgical Oncology consulted, but patient no longer considered a candidate for excisional biopsy of leg mass due to worsening functional/mental status.  IR consulted for possible core biopsy of R thigh mass, but patient unable to tolerate MRI yesterday 1/9 despite sedation with IV Ativan, possible IR mass of lung biopsy now being considered.  Palliative Care service consulted for medical decision making and additional GOC discussion.

## 2024-01-10 NOTE — CONSULT NOTE ADULT - REASON FOR ADMISSION
frequent falls, weakness

## 2024-01-10 NOTE — CONSULT NOTE ADULT - PROBLEM SELECTOR RECOMMENDATION 2
Now with superimposed delirium/metabolic encephalopathy, likely multi-factorial in the setting of metastatic cancer and hypercalcemia.  Patient is currently bedbound and total care, equivalent to FAST stage 7A.  PPS 30%.  Hospice appropriate    Continue donepezil and supportive care

## 2024-01-10 NOTE — PROGRESS NOTE ADULT - SUBJECTIVE AND OBJECTIVE BOX
CHIEF COMPLAINT  Falls    HISTORY OF PRESENT ILLNESS  NIKHIL CASTRO is a 83y Male who presents with a chief complaint of falls    Subjective:   No acute events. Patient is somnolent.  Unable to answer questions appropriately     REVIEW OF SYSTEMS  Unable - mental status    Allergies    Seafood (Unknown)  No Known Drug Allergies    Intolerances    MEDICATIONS  (STANDING):  aspirin  chewable 81 milliGRAM(s) Oral daily  donepezil 5 milliGRAM(s) Oral at bedtime  enoxaparin Injectable 40 milliGRAM(s) SubCutaneous every 24 hours  melatonin 3 milliGRAM(s) Oral at bedtime  sodium chloride 0.9%. 1000 milliLiter(s) (100 mL/Hr) IV Continuous <Continuous>  sodium chloride 0.9%. 1000 milliLiter(s) (100 mL/Hr) IV Continuous <Continuous>  sodium chloride 0.9%. 1000 milliLiter(s) (100 mL/Hr) IV Continuous <Continuous>  tamsulosin 0.4 milliGRAM(s) Oral at bedtime    MEDICATIONS  (PRN):    PHYSICAL EXAM  Vital Signs Last 24 Hrs  T(C): 36.5 (10 Rich 2024 10:00), Max: 36.8 (10 Rich 2024 05:24)  T(F): 97.7 (10 Rich 2024 10:00), Max: 98.2 (10 Rich 2024 05:24)  HR: 85 (10 Rich 2024 10:00) (85 - 96)  BP: 105/54 (10 Rich 2024 10:00) (105/54 - 122/46)  BP(mean): 65 (10 Rich 2024 10:00) (65 - 80)  RR: 18 (10 Rich 2024 10:00) (18 - 18)  SpO2: 94% (10 Rich 2024 10:00) (91% - 94%)    Parameters below as of 10 Rich 2024 10:00  Patient On (Oxygen Delivery Method): 1 LNC       Constitutional: lethargic, in no acute distress  Eyes: PERRL, EOMI  HEENT: normocephalic, atraumatic  Neck: supple, non-tender  Cardiovascular: normal perfusion, tachycardic  Respiratory: normal respiratory efforts; no increased use of accessory muscles  Gastrointestinal: soft, non-tender  Musculoskeletal: normal range of motion, no deformities noted  Skin: warm, dry    LABORATORY DATA                                   8.1    45.95 )-----------( 356      ( 10 Rich 2024 07:06 )             26.4   01-10    141  |  108  |  25<H>  ----------------------------<  96  4.0   |  25  |  0.82    Ca    11.1<H>      10 Rich 2024 07:06

## 2024-01-10 NOTE — PROGRESS NOTE ADULT - ASSESSMENT
NIKHIL CASTRO is a 83y Male who presents with a chief complaint of falls    Metastatic Malignancy  ·	Patient with known right upper thigh mass. Imaging also showed metastatic burden in the lungs.  ·	Concern for sarcoma. Patient may also have a prostate primary given elevated PSA (though < 10) with recent MRI of the prostate PIRADS-5.   ·	Patient is not a surgical candidate for excision.  ·	Interventional radiology consultation for biopsy. Will follow-up on pathology.  ·	MRI was unable to be done 1/9, consider biopsy of one of the lung met.   ·	Urology recommendations noted; no plans for inpatient workup or procedures at this time.    Leukocytosis  ·	Bone marrow biopsy in November 2023 was unremarkable with no evidence of hematological malignancy.  ·	Likely reactive to ongoing malignancy or inflammation.  ·	Rule out infections.  ·	Continue to monitor and trend.    Anemia  ·	In the setting of malignancy, inflammation. Ferritin very elevated.  ·	overall stable  ·	No need for further workup or intervention at this time.   ·	Will follow-up on remaining labs.   ·	Monitor and maintain above 7.    Will continue to follow.

## 2024-01-10 NOTE — PROGRESS NOTE ADULT - SUBJECTIVE AND OBJECTIVE BOX
DATE OF SERVICE: 01-10-24    Solmnent today, not answering questions     aspirin  chewable 81 milliGRAM(s) Oral daily  donepezil 5 milliGRAM(s) Oral at bedtime  enoxaparin Injectable 40 milliGRAM(s) SubCutaneous every 24 hours  melatonin 3 milliGRAM(s) Oral at bedtime  sodium chloride 0.9%. 1000 milliLiter(s) IV Continuous <Continuous>  sodium chloride 0.9%. 1000 milliLiter(s) IV Continuous <Continuous>  sodium chloride 0.9%. 1000 milliLiter(s) IV Continuous <Continuous>  tamsulosin 0.4 milliGRAM(s) Oral at bedtime                            8.1    45.95 )-----------( 356      ( 10 Rich 2024 07:06 )             26.4       Hemoglobin: 8.1 g/dL (01-10 @ 07:06)  Hemoglobin: 8.9 g/dL (01-09 @ 10:08)  Hemoglobin: 8.4 g/dL (01-09 @ 07:02)  Hemoglobin: 9.3 g/dL (01-08 @ 07:46)  Hemoglobin: 8.4 g/dL (01-07 @ 07:25)      01-10    141  |  108  |  25<H>  ----------------------------<  96  4.0   |  25  |  0.82    Ca    11.1<H>      10 Rich 2024 07:06      Creatinine Trend: 0.82<--, 0.90<--, 0.99<--, 0.81<--, 0.79<--, 0.95<--    COAGS:           T(C): 36.5 (01-10-24 @ 10:00), Max: 36.8 (01-10-24 @ 05:24)  HR: 85 (01-10-24 @ 10:00) (85 - 96)  BP: 105/54 (01-10-24 @ 10:00) (105/54 - 122/46)  RR: 18 (01-10-24 @ 10:00) (18 - 18)  SpO2: 94% (01-10-24 @ 10:00) (91% - 94%)  Wt(kg): --    I&O's Summary      HEENT:  (-)icterus (-)pallor  CV: N S1 S2 1/6 DANNY (+)2 Pulses B/l  Resp:  Clear to ausculatation B/L, normal effort  GI: (+) BS Soft, NT, ND  Lymph:  (-)Edema, (-)obvious lymphadenopathy  Skin: Warm to touch, Normal turgor  Psych: Unable to adequately assess mood and affect    ASSESSMENT/PLAN: 	83y Male  PMHx of BPH w/ urinary retention, stroke (2013), HTN, DM, afib not on ac, dementia, erectile dysfunction, vertigo, leukocytosis of unknown origin (currently being worked up at [facility] Dr. 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    # Abnormal EKG  - Appear to have wondering atrial pacemaker  - tachy overnight, no complaints of palpitation   - ECHO noted , normal LV fx  - No need for further inpatient cardiac work up.    # Fall  - No evidence of LOC    # Leukocytosis  - Heme/onc f/u  - Surgery f/u noted    Fredi Wilson MD, [facility]  BEEPER [phone]     DATE OF SERVICE: 01-10-24    Solmnent today, not answering questions     aspirin  chewable 81 milliGRAM(s) Oral daily  donepezil 5 milliGRAM(s) Oral at bedtime  enoxaparin Injectable 40 milliGRAM(s) SubCutaneous every 24 hours  melatonin 3 milliGRAM(s) Oral at bedtime  sodium chloride 0.9%. 1000 milliLiter(s) IV Continuous <Continuous>  sodium chloride 0.9%. 1000 milliLiter(s) IV Continuous <Continuous>  sodium chloride 0.9%. 1000 milliLiter(s) IV Continuous <Continuous>  tamsulosin 0.4 milliGRAM(s) Oral at bedtime                            8.1    45.95 )-----------( 356      ( 10 Rich 2024 07:06 )             26.4       Hemoglobin: 8.1 g/dL (01-10 @ 07:06)  Hemoglobin: 8.9 g/dL (01-09 @ 10:08)  Hemoglobin: 8.4 g/dL (01-09 @ 07:02)  Hemoglobin: 9.3 g/dL (01-08 @ 07:46)  Hemoglobin: 8.4 g/dL (01-07 @ 07:25)      01-10    141  |  108  |  25<H>  ----------------------------<  96  4.0   |  25  |  0.82    Ca    11.1<H>      10 Rich 2024 07:06      Creatinine Trend: 0.82<--, 0.90<--, 0.99<--, 0.81<--, 0.79<--, 0.95<--    COAGS:           T(C): 36.5 (01-10-24 @ 10:00), Max: 36.8 (01-10-24 @ 05:24)  HR: 85 (01-10-24 @ 10:00) (85 - 96)  BP: 105/54 (01-10-24 @ 10:00) (105/54 - 122/46)  RR: 18 (01-10-24 @ 10:00) (18 - 18)  SpO2: 94% (01-10-24 @ 10:00) (91% - 94%)  Wt(kg): --    I&O's Summary      HEENT:  (-)icterus (-)pallor  CV: N S1 S2 1/6 DANNY (+)2 Pulses B/l  Resp:  Clear to ausculatation B/L, normal effort  GI: (+) BS Soft, NT, ND  Lymph:  (-)Edema, (-)obvious lymphadenopathy  Skin: Warm to touch, Normal turgor  Psych: Unable to adequately assess mood and affect    ASSESSMENT/PLAN: 	83y Male  PMHx of BPH w/ urinary retention, stroke (2013), HTN, DM, afib not on ac, dementia, erectile dysfunction, vertigo, leukocytosis of unknown origin (currently being worked up at [facility] Dr. 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    # Abnormal EKG  - Appear to have wondering atrial pacemaker  - tachy overnight, no complaints of palpitation   - ECHO noted , normal LV fx  - No need for further inpatient cardiac work up.    # Fall  - No evidence of LOC    # Leukocytosis  - Heme/onc f/u  - Surgery f/u noted    Fredi Wilson MD, [facility]  BEEPER [phone]

## 2024-01-10 NOTE — CONSULT NOTE ADULT - SUBJECTIVE AND OBJECTIVE BOX
Consult to: Discuss complex medical decision making related to goals of care    [facility] Geriatric and Palliative Consult Service:  Tamela Vasquez DO: cell ([phone])  Raf Rodas MD: cell ([phone])  Benito Osorio NP: cell ([phone])   Toni Rico SW: cell ([phone])   Sharon Rousseau NP: via [employer]    Can contact any Palliative Team member via [employer] for consults and questions      HPI:  Pt is a 83 y.o M w/ PMHx of BPH w/ urinary retention, stroke (2013), HTN, DM, afib not on ac, dementia, erectile dysfunction, vertigo, leukocytosis of unknown origin (currently being worked up at [facility] Dr. 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 Wife in the HCP and is at bedside assisting in history taking. This was purely a mechanical fall as per patient and family, denies LOC, hitting head. States that he has vertigo and balance issues, but he has also been very weak, especially in the past three months. Pt endorses losing 60 pounds in the past 1.5 years, and wife endorses that he has had decreased appetite for the past 3 months. Pt also has intermittent night sweats. Pt and wife have noticed a right thigh mass, that they saw surgical oncologist Dr. Pugh for out-patient, and in order to do a biopsy on the mass, pt need cardiac clearance (which is why they were going to the cardiologist this morning). According to the wife, pt has also been seeing Dr. Domingo for his BPH and questionable prostate cancer. Denies dizziness currently, chest pain, sob, palpitations, fevers, chills, sick contacts, recent travel , urinary symptoms.     [facility] w/u from paperwork brought in by wife:  1. 11/2023 MRI Prostate- 2.1 x 1.4 cm lesion in right anterior base to mid gland transition zone suspicious for prostate ca.   2. 11/2023 CT chest ab/p/soft tissues- complex 14 cm mass of the medial right upper thigh in the level of the gracilis muscle concerning for  malignant soft tissue neoplasm, pulmonary nodules w/ emphysema, complex 0.9 cm left renal lesion possibly neoplasm   3. 10/2023 MRI Brain non con - mild to mod cerebral atrophy and chronic microvascular ischemic changes in the white matter. Chronic right cerebellar infarct  (04 Jan 2024 18:35)      PAST MEDICAL & SURGICAL HISTORY:  BPH with elevated PSA      History of stroke      HTN (hypertension)      Vertigo      Afib          SOCIAL HISTORY:    Admitted from:  home  (with HHA)           assisted living          [facility]       LT   [ none ] Substance abuse, [ none ] Tobacco hx, [ none ] Alcohol hx, [ none ] Home Opioid Hx    FAMILY HISTORY:   unable to obtain from patient due to poor mentation, family unable to give information, see H&P for history  Baseline ADLs (prior to admission):    Allergies    Seafood (Unknown)  No Known Drug Allergies    Intolerances      Present Symptoms: Mild, Moderate, Severe  Pain:             Location -                               Aggravating factors -             Quality -             Radiation -             Timing-             Severity (0-10 scale):             Minimal acceptable level (0-10 scale):  Fatigue:  Nausea:  Lack of Appetite:   SOB:  Depression:  Anxiety:  Review of Systems: [All others negative or Unable to obtain due to poor mentation]    CPOT:    https://ccs-st.org/resources/Documents/[place]%20Analgesia%20Delirium%20in%20CC/CCS%20STH%20Guideline%20template%20CPOT.pdf  PAIN AD Score:   [email or web address]/[email or web address].pdf (press ctrl +  left click to view)      MEDICATIONS  (STANDING):  aspirin  chewable 81 milliGRAM(s) Oral daily  chlorhexidine 2% Cloths 1 Application(s) Topical <User Schedule>  donepezil 5 milliGRAM(s) Oral at bedtime  enoxaparin Injectable 40 milliGRAM(s) SubCutaneous every 24 hours  melatonin 3 milliGRAM(s) Oral at bedtime  mupirocin 2% Ointment 1 Application(s) Both Nostrils two times a day  tamsulosin 0.4 milliGRAM(s) Oral at bedtime    MEDICATIONS  (PRN):      PHYSICAL EXAM:  Vital Signs Last 24 Hrs  T(C): 36.6 (10 Rich 2024 14:39), Max: 36.8 (10 Rich 2024 05:24)  T(F): 97.9 (10 Rcih 2024 14:39), Max: 98.2 (10 Rich 2024 05:24)  HR: 88 (10 Rich 2024 14:39) (85 - 96)  BP: 95/50 (10 Rich 2024 14:39) (95/50 - 108/67)  BP(mean): 65 (10 Rich 2024 10:00) (65 - 80)  RR: 20 (10 Rich 2024 14:39) (18 - 20)  SpO2: 97% (10 Rich 2024 14:39) (91% - 97%)    Parameters below as of 10 Rich 2024 14:39  Patient On (Oxygen Delivery Method): room air        General: alert  oriented x ____    lethargic distressed cachexia  nonverbal  unarousable verbal    Palliative Performance Scale/Karnofsky Score:  http://npcrc.org/files/news/palliative_performance_scale_ppsv2.pdf    HEENT: no abnormal lesion, dry mouth  ET tube/trach oral lesions:  Lungs: tachypnea/labored breathing, audible excessive secretions  CV: RRR, S1S2, tachycardia  GI: soft non distended non tender  incontinent               PEG/NG/OG tube  constipation  last BM:   : incontinent  oliguria/anuria  mckeon  Musculoskeletal: weakness x4 edema x4    ambulatory with assistance   mostly/fully bedbound/wheelchair bound  Skin: no abnormal skin lesions, poor skin turgor, pressure ulcer stage:   Neuro: no deficits, mild cognitive impairment dsyphagia/dysarthria paresis  Oral intake ability: unable/only mouth care, minimal moderate full capability    LABS:                        8.1    45.95 )-----------( 356      ( 10 Rich 2024 07:06 )             26.4     01-10    141  |  108  |  25<H>  ----------------------------<  96  4.0   |  25  |  0.82    Ca    11.1<H>      10 Rich 2024 07:06      Urinalysis Basic - ( 10 Rich 2024 07:06 )    Color: x / Appearance: x / SG: x / pH: x  Gluc: 96 mg/dL / Ketone: x  / Bili: x / Urobili: x   Blood: x / Protein: x / Nitrite: x   Leuk Esterase: x / RBC: x / WBC x   Sq Epi: x / Non Sq Epi: x / Bacteria: x        RADIOLOGY & ADDITIONAL STUDIES:     Consult to: Discuss complex medical decision making related to goals of care    [facility] Geriatric and Palliative Consult Service:  Tamela Vasquez DO: cell ([phone])  Raf Rodas MD: cell ([phone])  Benito Osorio NP: cell ([phone])   Toni Rico SW: cell ([phone])   Sharon Rousseau NP: via [employer] Teams    Can contact any Palliative Team member via [employer] Teams for consults and questions      HPI:  Pt is a 83 y.o M w/ PMHx of BPH w/ urinary retention, stroke (2013), HTN, DM, afib not on ac, dementia, erectile dysfunction, vertigo, leukocytosis of unknown origin (currently being worked up at [facility] Dr. 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 Wife in the HCP and is at bedside assisting in history taking. This was purely a mechanical fall as per patient and family, denies LOC, hitting head. States that he has vertigo and balance issues, but he has also been very weak, especially in the past three months. Pt endorses losing 60 pounds in the past 1.5 years, and wife endorses that he has had decreased appetite for the past 3 months. Pt also has intermittent night sweats. Pt and wife have noticed a right thigh mass, that they saw surgical oncologist Dr. Pugh for out-patient, and in order to do a biopsy on the mass, pt need cardiac clearance (which is why they were going to the cardiologist this morning). According to the wife, pt has also been seeing Dr. Domingo for his BPH and questionable prostate cancer. Denies dizziness currently, chest pain, sob, palpitations, fevers, chills, sick contacts, recent travel , urinary symptoms.     [facility] w/u from paperwork brought in by wife:  1. 11/2023 MRI Prostate- 2.1 x 1.4 cm lesion in right anterior base to mid gland transition zone suspicious for prostate ca.   2. 11/2023 CT chest ab/p/soft tissues- complex 14 cm mass of the medial right upper thigh in the level of the gracilis muscle concerning for  malignant soft tissue neoplasm, pulmonary nodules w/ emphysema, complex 0.9 cm left renal lesion possibly neoplasm   3. 10/2023 MRI Brain non con - mild to mod cerebral atrophy and chronic microvascular ischemic changes in the white matter. Chronic right cerebellar infarct  (04 Jan 2024 18:35)      PAST MEDICAL & SURGICAL HISTORY:  BPH with elevated PSA      History of stroke      HTN (hypertension)      Vertigo      Afib          SOCIAL HISTORY:    Admitted from:  home  (with HHA)           assisted living          [facility]       LT   [ none ] Substance abuse, [ none ] Tobacco hx, [ none ] Alcohol hx, [ none ] Home Opioid Hx    FAMILY HISTORY:   unable to obtain from patient due to poor mentation, family unable to give information, see H&P for history  Baseline ADLs (prior to admission):    Allergies    Seafood (Unknown)  No Known Drug Allergies    Intolerances      Present Symptoms: Mild, Moderate, Severe  Pain:             Location -                               Aggravating factors -             Quality -             Radiation -             Timing-             Severity (0-10 scale):             Minimal acceptable level (0-10 scale):  Fatigue:  Nausea:  Lack of Appetite:   SOB:  Depression:  Anxiety:  Review of Systems: [All others negative or Unable to obtain due to poor mentation]    CPOT:    https://ccs-st.org/resources/Documents/[place]%20Analgesia%20Delirium%20in%20CC/CCS%20STH%20Guideline%20template%20CPOT.pdf  PAIN AD Score:   http://[email or web address].[email or web address]/cog/[email or web address].pdf (press ctrl +  left click to view)      MEDICATIONS  (STANDING):  aspirin  chewable 81 milliGRAM(s) Oral daily  chlorhexidine 2% Cloths 1 Application(s) Topical <User Schedule>  donepezil 5 milliGRAM(s) Oral at bedtime  enoxaparin Injectable 40 milliGRAM(s) SubCutaneous every 24 hours  melatonin 3 milliGRAM(s) Oral at bedtime  mupirocin 2% Ointment 1 Application(s) Both Nostrils two times a day  tamsulosin 0.4 milliGRAM(s) Oral at bedtime    MEDICATIONS  (PRN):      PHYSICAL EXAM:  Vital Signs Last 24 Hrs  T(C): 36.6 (10 Rich 2024 14:39), Max: 36.8 (10 Rich 2024 05:24)  T(F): 97.9 (10 Rich 2024 14:39), Max: 98.2 (10 Rich 2024 05:24)  HR: 88 (10 Rich 2024 14:39) (85 - 96)  BP: 95/50 (10 Rich 2024 14:39) (95/50 - 108/67)  BP(mean): 65 (10 Rich 2024 10:00) (65 - 80)  RR: 20 (10 Rich 2024 14:39) (18 - 20)  SpO2: 97% (10 Rich 2024 14:39) (91% - 97%)    Parameters below as of 10 Rich 2024 14:39  Patient On (Oxygen Delivery Method): room air        General: alert  oriented x ____    lethargic distressed cachexia  nonverbal  unarousable verbal    Palliative Performance Scale/Karnofsky Score:  http://npcrc.org/files/news/palliative_performance_scale_ppsv2.pdf    HEENT: no abnormal lesion, dry mouth  ET tube/trach oral lesions:  Lungs: tachypnea/labored breathing, audible excessive secretions  CV: RRR, S1S2, tachycardia  GI: soft non distended non tender  incontinent               PEG/NG/OG tube  constipation  last BM:   : incontinent  oliguria/anuria  mckeon  Musculoskeletal: weakness x4 edema x4    ambulatory with assistance   mostly/fully bedbound/wheelchair bound  Skin: no abnormal skin lesions, poor skin turgor, pressure ulcer stage:   Neuro: no deficits, mild cognitive impairment dsyphagia/dysarthria paresis  Oral intake ability: unable/only mouth care, minimal moderate full capability    LABS:                        8.1    45.95 )-----------( 356      ( 10 Rich 2024 07:06 )             26.4     01-10    141  |  108  |  25<H>  ----------------------------<  96  4.0   |  25  |  0.82    Ca    11.1<H>      10 Rich 2024 07:06      Urinalysis Basic - ( 10 Rich 2024 07:06 )    Color: x / Appearance: x / SG: x / pH: x  Gluc: 96 mg/dL / Ketone: x  / Bili: x / Urobili: x   Blood: x / Protein: x / Nitrite: x   Leuk Esterase: x / RBC: x / WBC x   Sq Epi: x / Non Sq Epi: x / Bacteria: x        RADIOLOGY & ADDITIONAL STUDIES:     Consult to: Discuss complex medical decision making related to goals of care    [facility] Geriatric and Palliative Consult Service:  Tamela Vasquez DO: cell ([phone])  Raf Rodas MD: cell ([phone])  Benito Osorio NP: cell ([phone])   Toni Rico SW: cell ([phone])   Sharon Rousseau NP: via [employer]    Can contact any Palliative Team member via [employer] Teams for consults and questions      HPI:  Pt is a 83 y.o M w/ PMHx of BPH w/ urinary retention, stroke (2013), HTN, DM, afib not on ac, dementia (on home Aricept), erectile dysfunction, vertigo, leukocytosis of unknown origin (currently being worked up at [facility] Dr. 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 Wife in the HCP and is at bedside assisting in history taking. This was purely a mechanical fall as per patient and family, denies LOC, hitting head. States that he has vertigo and balance issues, but he has also been very weak, especially in the past three months. Pt endorses losing 60 pounds in the past 1.5 years, and wife endorses that he has had decreased appetite for the past 3 months. Pt also has intermittent night sweats. Pt and wife have noticed a right thigh mass, that they saw surgical oncologist Dr. Pugh for out-patient, and in order to do a biopsy on the mass, pt need cardiac clearance (which is why they were going to the cardiologist this morning). According to the wife, pt has also been seeing Dr. Domingo for his BPH and questionable prostate cancer. Denies dizziness currently, chest pain, sob, palpitations, fevers, chills, sick contacts, recent travel , urinary symptoms.     [facility] w/u from paperwork brought in by wife:  1. 11/2023 MRI Prostate- 2.1 x 1.4 cm lesion in right anterior base to mid gland transition zone suspicious for prostate ca.   2. 11/2023 CT chest ab/p/soft tissues- complex 14 cm mass of the medial right upper thigh in the level of the gracilis muscle concerning for  malignant soft tissue neoplasm, pulmonary nodules w/ emphysema, complex 0.9 cm left renal lesion possibly neoplasm   3. 10/2023 MRI Brain non con - mild to mod cerebral atrophy and chronic microvascular ischemic changes in the white matter. Chronic right cerebellar infarct  (04 Jan 2024 18:35)    Initial ER labs were notable for WBC of 47, albumin of 1.7, and hypercalcemia (corrected calcium of 13).  CXR showed scattered small pulmonary infiltrates, no fractures identified on additional x-ray imaging, CT of head was negative for acute intracranial infarct/hemorrhage/mass  (+ minimal anterior periventricular white matter ischemic changes).  Patient was admitted for ambulatory dysfunction, hypercalcemia, and malignancy work-up.  Heme/Onc consulted; per their notes patient with previous BM biopsy in Dec. 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  CT of chest/abd/pelvis (1/7) showed increasing metastatic disease burden to the lungs.  Surgical Oncology consulted, but patient no longer considered a candidate for excisional biopsy of leg mass due to worsening functional/mental status.  IR consulted for possible core biopsy of R thigh mass, but patient unable to tolerate MRI yesterday 1/9 despite sedation with IV Ativan, possible IR mass of lung biopsy now being considered.  Palliative Care service consulted for medical decision making and additional GOC discussion.    Patient examined at bedside this afternoon.  Alert and oriented x 0, obtunded, confused.  Limited verbal response, mostly incoherent.  No overt signs of pain or SOB.  No other acute issues noted by bedside nursing staff.      PAST MEDICAL & SURGICAL HISTORY:  BPH with elevated PSA      History of stroke      HTN (hypertension)      Vertigo      Afib          SOCIAL HISTORY:    Admitted from:  home  (with wife)  no HHA support  [ none ] Substance abuse, [ none ] Tobacco hx, [ none ] Alcohol hx, [ none ] Home Opioid Hx    FAMILY HISTORY:  Unable to obtain from patient due to poor mentation, family not available to give information, see H&P for history  Baseline ADLs (prior to admission):  Patient previously independent, but now requiring increased assistance for ambulation and ADLs    Allergies    Seafood (Unknown)  No Known Drug Allergies    Intolerances      Present Symptoms: Mild, Moderate, Severe  Pain:  Obtunded, unable to verbalize, CPOT 0        Review of Systems:   Unable to obtain due to poor mentation/lethargy/dementia    CPOT:  0  https://ccs-[facility].org/resources/Documents/[place]%20Analgesia%20Delirium%20in%20CC/[facility]%20STH%20Guideline%20template%20CPOT.pdf      MEDICATIONS  (STANDING):  aspirin  chewable 81 milliGRAM(s) Oral daily  chlorhexidine 2% Cloths 1 Application(s) Topical <User Schedule>  donepezil 5 milliGRAM(s) Oral at bedtime  enoxaparin Injectable 40 milliGRAM(s) SubCutaneous every 24 hours  melatonin 3 milliGRAM(s) Oral at bedtime  mupirocin 2% Ointment 1 Application(s) Both Nostrils two times a day  tamsulosin 0.4 milliGRAM(s) Oral at bedtime    MEDICATIONS  (PRN):      PHYSICAL EXAM:  Vital Signs Last 24 Hrs  T(C): 36.6 (10 Rich 2024 14:39), Max: 36.8 (10 Rich 2024 05:24)  T(F): 97.9 (10 Rich 2024 14:39), Max: 98.2 (10 Rich 2024 05:24)  HR: 88 (10 Rich 2024 14:39) (85 - 96)  BP: 95/50 (10 Rich 2024 14:39) (95/50 - 108/67)  BP(mean): 65 (10 Rich 2024 10:00) (65 - 80)  RR: 20 (10 Rich 2024 14:39) (18 - 20)  SpO2: 97% (10 Rich 2024 14:39) (91% - 97%)    Parameters below as of 10 Rich 2024 14:39  Patient On (Oxygen Delivery Method): room air        General: alert  oriented x __0__    lethargic but arousable, grossly confused, + mild temporal wasting, NAD    Palliative Performance Scale/Karnofsky Score:  30%  http://npcrc.org/files/news/palliative_performance_scale_ppsv2.pdf    HEENT: EOMI anicteric, pharynx clear, MM moist  Lungs: clear to auscultation, respirations unlabored, no congestion noed  CV: RSR, normal S1 and S2, no murmur  GI: soft non distended non tender  + normal bowel sounds  : incontinent    Musculoskeletal: weakness x4  in all extremities, essentially bedbound, ++ hard mass of R medial thigh (sl tender to palpation), no edema noted  Skin: no abnormal skin lesions or DU noted  Neuro: no focal deficits, ++ severe cognitive impairment  + dysphagia   Oral intake ability:  minimal to moderate capability, on soft and bite sized diet      LABS:                        8.1    45.95 )-----------( 356      ( 10 Rich 2024 07:06 )             26.4     01-10    141  |  108  |  25<H>  ----------------------------<  96  4.0   |  25  |  0.82    Ca    11.1<H>      10 Rich 2024 07:06      Albumin: 1.6 g/dL (01.08.24 @ 07:46)      Urinalysis Basic - ( 10 Rich 2024 07:06 )    Color: x / Appearance: x / SG: x / pH: x  Gluc: 96 mg/dL / Ketone: x  / Bili: x / Urobili: x   Blood: x / Protein: x / Nitrite: x   Leuk Esterase: x / RBC: x / WBC x   Sq Epi: x / Non Sq Epi: x / Bacteria: x        RADIOLOGY & ADDITIONAL STUDIES:    ACC: [id number] EXAM:  XR CHEST AP OR PA 1V   ORDERED BY: GERRY DENT     ACC: [id number] EXAM:  XR PELVIS AP ONLY 1-2 VIEWS   ORDERED BY: GERRY DENT     ACC: [id number] EXAM:  XR KNEE AP LAT OBL 3 VIEWS LT   ORDERED BY: GERRY DENT     PROCEDURE DATE:  01/04/2024          INTERPRETATION:  Left knee, pelvis, and chest. Patient had a fall.    Left knee. 2 views.    No effusion. Quite advanced degeneration. No fracture. Some arterial   calcification.    Pelvis.    There is slight symmetric spurring of theouter acetabular corners. There   is degenerative loss of disc height concentrated on the right at L4-5. No   fracture.    Right abdominal calcifications could be and lymph nodes.    AP chest on January 4, 2024 at 5:05 PM.    Heart magnified by technique.    There is a left base mass measuring 3.9 cm which can be seen on CAT scan   of November 6, 2023. The present film shows small scattered infiltrates   which appear new since prior.    IMPRESSION: No fracture. Quite advanced left knee degeneration. Stable   left breast mass which on outside CAT scan was largely fatty in nature.    Presently there are small scattered lung infiltrates.    --- End of Report ---      ACC: [id number] EXAM:  CT BRAIN   ORDERED BY: GERRY DENT     PROCEDURE DATE:  01/04/2024          INTERPRETATION:  CT head without IV contrast    CLINICAL INFORMATION:  Fall   Intracranial hemorrhage.    TECHNIQUE: Contiguous axial 5 mm sections were obtained through the head.   Sagittal and coronal 2-D reformatted images were also obtained.   This   scan was performed using automatic exposure control (radiation dose   reduction software) to obtain a diagnostic image quality scan with   patient dose as low as reasonably achievable.    FINDINGS:   No previous examinations are available for review.    The brain demonstrates demonstrates minimal anterior periventricular   white matter ischemia..   No acute cerebral cortical infarct is seen.  No   intracranial hemorrhage is found.  No mass effect is found in the brain.    The ventricles, sulci and basal cisterns are enlarged to a degree   appropriate for the patient's age.  .  Cavum septum lucidum and cavum   vergae are noted.    The orbits are unremarkable.  The paranasal sinuses are significant for   mild mucosal thickening in the RIGHT sphenoid sinus with inspissated   secretions.  The nasal cavity appears intact.  The nasopharynx is   symmetric.  The central skull base, petrous temporal bones and calvarium   remain intact.      IMPRESSION:   Minimal anterior periventricular white matter ischemia.      Mild mucosal thickening in the RIGHT sphenoid sinus with inspissated   secretions.    --- End of Report ---      ACC: [id number] EXAM:  CT ABDOMEN AND PELVIS IC   ORDERED BY: JACKI JARQUIN     ACC: [id number] EXAM:  CT CHEST IC   ORDERED BY: JACKI JARQUIN     PROCEDURE DATE:  01/07/2024          INTERPRETATION:  CLINICAL INFORMATION: Right leg malignancy.    COMPARISON: Outside contrast-enhanced CT of the thorax, abdomen, and   pelvis November 6, 2023.    CONTRAST/COMPLICATIONS:  IV Contrast: Omnipaque 350 (accession [id number]), IV contrast documented   in unlinked concurrent exam (accession [id number])90 cc administered   10   cc discarded  Oral Contrast: NONE  Complications: None reported at time of study completion    PROCEDURE:  CT of the Chest, Abdomen and Pelvis was performed.  Sagittal and coronal reformats were performed.    FINDINGS:  CHEST:  LUNGS AND LARGE AIRWAYS: Patent central airways. There is been interval   increase in size and number of numerous centrally necrotic lung masses   measuring up to 3.7 cm in the left lower lobe compatible with metastatic   disease. Mild right lowerlobe dependent atelectasis.  PLEURA: Trace bilateral layering pleural effusions, right greater than   left.  VESSELS: Within normal limits.  HEART: Heart size is normal. No pericardial effusion.  MEDIASTINUM AND REMY: No lymphadenopathy.  CHEST WALL AND LOWER NECK: Within normal limits.    ABDOMEN AND PELVIS:  LIVER: Within normal limits.  BILE DUCTS: Normal caliber.  GALLBLADDER: Within normal limits.  SPLEEN: Within normal limits.  PANCREAS: Within normal limits.  ADRENALS: Within normal limits.  KIDNEYS/URETERS: Left renal cysts. Otherwise, within normal limits.    BLADDER: Within normal limits.  REPRODUCTIVE ORGANS: The prostate is not enlarged.    BOWEL: Colonic diverticulosis. No bowel obstruction. Appendix is normal.   There is no mesenteric adenopathy.  PERITONEUM: No ascites.  VESSELS: Within normal limits.  RETROPERITONEUM/LYMPH NODES: No lymphadenopathy.  ABDOMINAL WALL: Small fat-containing left inguinal hernia.  BONES: Mild anterolisthesis of L4 on L5. Degenerative disc disease at   L5-S1.. There is been interval increase in size of an incompletely   visualized peripherally enhancing mass in the right medial thigh.    IMPRESSION: Extensive progressive metastatic burden to the lungs.    --- End of Report ---       Consult to: Discuss complex medical decision making related to goals of care    [facility] Geriatric and Palliative Consult Service:  Tamela Vasquez DO: cell ([phone])  Raf Rodas MD: cell ([phone])  Benito Osorio NP: cell ([phone])   Toni Rico SW: cell ([phone])   Sharon Rousseau NP: via [employer] Teams    Can contact any Palliative Team member via [employer] Teams for consults and questions      HPI:  Pt is a 83 y.o M w/ PMHx of BPH w/ urinary retention, stroke (2013), HTN, DM, afib not on ac, dementia (on home Aricept), erectile dysfunction, vertigo, leukocytosis of unknown origin (currently being worked up at [facility] Dr. 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 Wife in the HCP and is at bedside assisting in history taking. This was purely a mechanical fall as per patient and family, denies LOC, hitting head. States that he has vertigo and balance issues, but he has also been very weak, especially in the past three months. Pt endorses losing 60 pounds in the past 1.5 years, and wife endorses that he has had decreased appetite for the past 3 months. Pt also has intermittent night sweats. Pt and wife have noticed a right thigh mass, that they saw surgical oncologist Dr. Pugh for out-patient, and in order to do a biopsy on the mass, pt need cardiac clearance (which is why they were going to the cardiologist this morning). According to the wife, pt has also been seeing Dr. Domingo for his BPH and questionable prostate cancer. Denies dizziness currently, chest pain, sob, palpitations, fevers, chills, sick contacts, recent travel , urinary symptoms.     [facility] w/u from paperwork brought in by wife:  1. 11/2023 MRI Prostate- 2.1 x 1.4 cm lesion in right anterior base to mid gland transition zone suspicious for prostate ca.   2. 11/2023 CT chest ab/p/soft tissues- complex 14 cm mass of the medial right upper thigh in the level of the gracilis muscle concerning for  malignant soft tissue neoplasm, pulmonary nodules w/ emphysema, complex 0.9 cm left renal lesion possibly neoplasm   3. 10/2023 MRI Brain non con - mild to mod cerebral atrophy and chronic microvascular ischemic changes in the white matter. Chronic right cerebellar infarct  (04 Jan 2024 18:35)    Initial ER labs were notable for WBC of 47, albumin of 1.7, and hypercalcemia (corrected calcium of 13).  CXR showed scattered small pulmonary infiltrates, no fractures identified on additional x-ray imaging, CT of head was negative for acute intracranial infarct/hemorrhage/mass  (+ minimal anterior periventricular white matter ischemic changes).  Patient was admitted for ambulatory dysfunction, hypercalcemia, and malignancy work-up.  Heme/Onc consulted; per their notes patient with previous BM biopsy in Dec. 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  CT of chest/abd/pelvis (1/7) showed increasing metastatic disease burden to the lungs.  Surgical Oncology consulted, but patient no longer considered a candidate for excisional biopsy of leg mass due to worsening functional/mental status.  IR consulted for possible core biopsy of R thigh mass, but patient unable to tolerate MRI yesterday 1/9 despite sedation with IV Ativan, possible IR mass of lung biopsy now being considered.  Palliative Care service consulted for medical decision making and additional GOC discussion.    Patient examined at bedside this afternoon.  Alert and oriented x 0, obtunded, confused.  Limited verbal response, mostly incoherent.  No overt signs of pain or SOB.  No other acute issues noted by bedside nursing staff.      PAST MEDICAL & SURGICAL HISTORY:  BPH with elevated PSA      History of stroke      HTN (hypertension)      Vertigo      Afib          SOCIAL HISTORY:    Admitted from:  home  (with wife)  no HHA support  [ none ] Substance abuse, [ none ] Tobacco hx, [ none ] Alcohol hx, [ none ] Home Opioid Hx    FAMILY HISTORY:  Unable to obtain from patient due to poor mentation, family not available to give information, see H&P for history  Baseline ADLs (prior to admission):  Patient previously independent, but now requiring increased assistance for ambulation and ADLs    Allergies    Seafood (Unknown)  No Known Drug Allergies    Intolerances      Present Symptoms: Mild, Moderate, Severe  Pain:  Obtunded, unable to verbalize, CPOT 0        Review of Systems:   Unable to obtain due to poor mentation/lethargy/dementia    CPOT:  0  [email or web address]/Sedation%20Analgesia%20Delirium%20in%20CC/[facility]%20STH%20Guideline%20template%20CPOT.pdf      MEDICATIONS  (STANDING):  aspirin  chewable 81 milliGRAM(s) Oral daily  chlorhexidine 2% Cloths 1 Application(s) Topical <User Schedule>  donepezil 5 milliGRAM(s) Oral at bedtime  enoxaparin Injectable 40 milliGRAM(s) SubCutaneous every 24 hours  melatonin 3 milliGRAM(s) Oral at bedtime  mupirocin 2% Ointment 1 Application(s) Both Nostrils two times a day  tamsulosin 0.4 milliGRAM(s) Oral at bedtime    MEDICATIONS  (PRN):      PHYSICAL EXAM:  Vital Signs Last 24 Hrs  T(C): 36.6 (10 Rich 2024 14:39), Max: 36.8 (10 Rich 2024 05:24)  T(F): 97.9 (10 Rich 2024 14:39), Max: 98.2 (10 Rich 2024 05:24)  HR: 88 (10 Rich 2024 14:39) (85 - 96)  BP: 95/50 (10 Rich 2024 14:39) (95/50 - 108/67)  BP(mean): 65 (10 Rich 2024 10:00) (65 - 80)  RR: 20 (10 Rich 2024 14:39) (18 - 20)  SpO2: 97% (10 Rich 2024 14:39) (91% - 97%)    Parameters below as of 10 Rich 2024 14:39  Patient On (Oxygen Delivery Method): room air        General: alert  oriented x __0__    lethargic but arousable, grossly confused, + mild temporal wasting, NAD    Palliative Performance Scale/Karnofsky Score:  30%  http://npcrc.org/files/news/palliative_performance_scale_ppsv2.pdf    HEENT: EOMI anicteric, pharynx clear, MM moist  Lungs: clear to auscultation, respirations unlabored, no congestion noed  CV: RSR, normal S1 and S2, no murmur  GI: soft non distended non tender  + normal bowel sounds  : incontinent    Musculoskeletal: weakness x4  in all extremities, essentially bedbound, ++ hard mass of R medial thigh (sl tender to palpation), no edema noted  Skin: no abnormal skin lesions or DU noted  Neuro: no focal deficits, ++ severe cognitive impairment  + dysphagia   Oral intake ability:  minimal to moderate capability, on soft and bite sized diet      LABS:                        8.1    45.95 )-----------( 356      ( 10 Rich 2024 07:06 )             26.4     01-10    141  |  108  |  25<H>  ----------------------------<  96  4.0   |  25  |  0.82    Ca    11.1<H>      10 Rich 2024 07:06      Albumin: 1.6 g/dL (01.08.24 @ 07:46)      Urinalysis Basic - ( 10 Rich 2024 07:06 )    Color: x / Appearance: x / SG: x / pH: x  Gluc: 96 mg/dL / Ketone: x  / Bili: x / Urobili: x   Blood: x / Protein: x / Nitrite: x   Leuk Esterase: x / RBC: x / WBC x   Sq Epi: x / Non Sq Epi: x / Bacteria: x        RADIOLOGY & ADDITIONAL STUDIES:    ACC: [id number] EXAM:  XR CHEST AP OR PA 1V   ORDERED BY: GERRY DENT     ACC: [id number] EXAM:  XR PELVIS AP ONLY 1-2 VIEWS   ORDERED BY: GERRY DENT     ACC: [id number] EXAM:  XR KNEE AP LAT OBL 3 VIEWS LT   ORDERED BY: GERRY DENT     PROCEDURE DATE:  01/04/2024          INTERPRETATION:  Left knee, pelvis, and chest. Patient had a fall.    Left knee. 2 views.    No effusion. Quite advanced degeneration. No fracture. Some arterial   calcification.    Pelvis.    There is slight symmetric spurring of theouter acetabular corners. There   is degenerative loss of disc height concentrated on the right at L4-5. No   fracture.    Right abdominal calcifications could be and lymph nodes.    AP chest on January 4, 2024 at 5:05 PM.    Heart magnified by technique.    There is a left base mass measuring 3.9 cm which can be seen on CAT scan   of November 6, 2023. The present film shows small scattered infiltrates   which appear new since prior.    IMPRESSION: No fracture. Quite advanced left knee degeneration. Stable   left breast mass which on outside CAT scan was largely fatty in nature.    Presently there are small scattered lung infiltrates.    --- End of Report ---      ACC: [id number] EXAM:  CT BRAIN   ORDERED BY: GERRY DENT     PROCEDURE DATE:  01/04/2024          INTERPRETATION:  CT head without IV contrast    CLINICAL INFORMATION:  Fall   Intracranial hemorrhage.    TECHNIQUE: Contiguous axial 5 mm sections were obtained through the head.   Sagittal and coronal 2-D reformatted images were also obtained.   This   scan was performed using automatic exposure control (radiation dose   reduction software) to obtain a diagnostic image quality scan with   patient dose as low as reasonably achievable.    FINDINGS:   No previous examinations are available for review.    The brain demonstrates demonstrates minimal anterior periventricular   white matter ischemia..   No acute cerebral cortical infarct is seen.  No   intracranial hemorrhage is found.  No mass effect is found in the brain.    The ventricles, sulci and basal cisterns are enlarged to a degree   appropriate for the patient's age.  .  Cavum septum lucidum and cavum   vergae are noted.    The orbits are unremarkable.  The paranasal sinuses are significant for   mild mucosal thickening in the RIGHT sphenoid sinus with inspissated   secretions.  The nasal cavity appears intact.  The nasopharynx is   symmetric.  The central skull base, petrous temporal bones and calvarium   remain intact.      IMPRESSION:   Minimal anterior periventricular white matter ischemia.      Mild mucosal thickening in the RIGHT sphenoid sinus with inspissated   secretions.    --- End of Report ---      ACC: [id number] EXAM:  CT ABDOMEN AND PELVIS IC   ORDERED BY: JACKI JARQUIN     ACC: [id number] EXAM:  CT CHEST IC   ORDERED BY: JACKI JARQUIN     PROCEDURE DATE:  01/07/2024          INTERPRETATION:  CLINICAL INFORMATION: Right leg malignancy.    COMPARISON: Outside contrast-enhanced CT of the thorax, abdomen, and   pelvis November 6, 2023.    CONTRAST/COMPLICATIONS:  IV Contrast: Omnipaque 350 (accession [id number]), IV contrast documented   in unlinked concurrent exam (accession [id number])90 cc administered   10   cc discarded  Oral Contrast: NONE  Complications: None reported at time of study completion    PROCEDURE:  CT of the Chest, Abdomen and Pelvis was performed.  Sagittal and coronal reformats were performed.    FINDINGS:  CHEST:  LUNGS AND LARGE AIRWAYS: Patent central airways. There is been interval   increase in size and number of numerous centrally necrotic lung masses   measuring up to 3.7 cm in the left lower lobe compatible with metastatic   disease. Mild right lowerlobe dependent atelectasis.  PLEURA: Trace bilateral layering pleural effusions, right greater than   left.  VESSELS: Within normal limits.  HEART: Heart size is normal. No pericardial effusion.  MEDIASTINUM AND REMY: No lymphadenopathy.  CHEST WALL AND LOWER NECK: Within normal limits.    ABDOMEN AND PELVIS:  LIVER: Within normal limits.  BILE DUCTS: Normal caliber.  GALLBLADDER: Within normal limits.  SPLEEN: Within normal limits.  PANCREAS: Within normal limits.  ADRENALS: Within normal limits.  KIDNEYS/URETERS: Left renal cysts. Otherwise, within normal limits.    BLADDER: Within normal limits.  REPRODUCTIVE ORGANS: The prostate is not enlarged.    BOWEL: Colonic diverticulosis. No bowel obstruction. Appendix is normal.   There is no mesenteric adenopathy.  PERITONEUM: No ascites.  VESSELS: Within normal limits.  RETROPERITONEUM/LYMPH NODES: No lymphadenopathy.  ABDOMINAL WALL: Small fat-containing left inguinal hernia.  BONES: Mild anterolisthesis of L4 on L5. Degenerative disc disease at   L5-S1.. There is been interval increase in size of an incompletely   visualized peripherally enhancing mass in the right medial thigh.    IMPRESSION: Extensive progressive metastatic burden to the lungs.    --- End of Report ---

## 2024-01-10 NOTE — CONSULT NOTE ADULT - PROBLEM SELECTOR RECOMMENDATION 5
As above.  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  ECOG 4  with PPS for 30%.  He is a poor candidate for any cancer directed treatment (systemic or otherwise).  Hospice appropriate with prognosis of weeks to 2-3 months.    See GOC discussion above--wife reluctant to pursue further biopsy or work-up, verbally in agreement with hospice, likely in LTC/facility setting.  Unable to make additional decisions about LST, patient to remain Full Code at this time    Remainder of management as per primary medical team  Will collaborate with Pall Care team SW to explore LTC hospice options (patient does not have Medicaid)  Discussed with primary team and Dr. Soriano in detail  Palliative Care team will continue to follow

## 2024-01-10 NOTE — CONSULT NOTE ADULT - PROBLEM SELECTOR RECOMMENDATION 9
CT with worsening metastatic disease to lungs  Has R thigh mass concerning for sarcoma, also with concern for primary prostate Ca  Heme/Onc notes from [facility] appreciated; I also discussed patient with Dr. Pugh by phone earlier today    Patient is currently ECOG 4 with poor functional status and worsening delirium/encephalopathy on top of presumed vascular dementia.  He is now essentially bedbound and has become increasingly obtunded.  He would likely now require IR biopsy of a lung nodule for tissue diagnosis (risks far outweigh benefits).  Even if a responsive tumor was found, I do not see how patient would be able to tolerate chemotherapy or any type of cancer directed treatment.  He is medically appropriate for hospice with likely prognosis of weeks to less than 2-3 months    See GOC discussion above--wife unable to care for patient at home, wishes to consider LTC placement with hospice, will collaborate with Pall Care team SW

## 2024-01-10 NOTE — CONSULT NOTE ADULT - PROBLEM SELECTOR RECOMMENDATION 4
Clinical evidence indicates that the patient has Severe protein calorie malnutrition/ 3rd degree    In context of      Chronic Illness (>1 month)--progressive vascular dementia, now with metastatic cancer of unknown primary site, as evidenced by:    Energy/Food intake <50% of estimated energy requirement >5 days  Weight loss: Moderate - severe (lbs lost recently)  Body Fat loss: Severe   + mild temporal wasting  Muscle mass loss: Severe  -- albumin 1.6   Strength: weakened severe (bedbound)    Recommend:   Continue soft and bite sized diet as tolerated - aspiration precautions, careful hand-feeding, teaching to caregivers  On supplementation with Ensure Enlive BID    Family undecided on long term nutritional support, would benefit from additional GOC discussions

## 2024-01-10 NOTE — PROGRESS NOTE ADULT - PROBLEM SELECTOR PLAN 1
Leukocytosis with negative outpatient leukemia workup  - CT Abd & Pelvis- right thigh mass  - CT Chest extensive progressive Metastatic burden to the lungs  - Sx/Onc consulted: not a candidate for excisional biopsy  - IR consulted for Core biopsy of thigh recs MRI first but pt unable to tolerate MRI today despite Ativan prior to test  - Palliative consulted -- F/U recs  - hem/onc following

## 2024-01-10 NOTE — PROGRESS NOTE ADULT - ASSESSMENT
83 y.o M w/ PMHx of BPH w/ urinary retention, stroke (2013), emphysema, afib not on ac, dementia, erectile dysfunction, vertigo, leukocytosis of unknown origin (currently being worked up at [facility] Dr. 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 Admitted for ambulatory dysfunction and malignancy workup 83 y.o M w/ PMHx of BPH w/ urinary retention, stroke (2013), emphysema, afib not on ac, dementia, erectile dysfunction, vertigo, leukocytosis of unknown origin (currently being worked up at [facility] Dr. 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 Admitted for ambulatory dysfunction and malignancy workup

## 2024-01-10 NOTE — CONSULT NOTE ADULT - CONVERSATION DETAILS
Face to face family meeting held with wife at bedside x 30 minutes (as separately identifiable service) to discuss prognosis, ACP, goals of care, and treatment preferences.  Hospitalist Dr. Soriano also participated in the discussion.  Wife is aware that of possible diagnosis of sarcoma and that patient likely had advanced metastatic cancer.  She recounted how patient "never wanted to go to the doctor" and how he has had a rapid functional and clinical decline over the last 3 months.  She expressed some frustration at patient being unable to complete the MRI, states that sedation wasn't given enough time to work.  At the same she sees how weak the patient has become, how rapidly he is declining, and does not see how he could be a candidate for any type of cancer directed therapy.  She thinks he is medically ready for hospice, but states that she is unable to care for him at home.  She also is already preoccupied about his will, about whether his ex-wife and three children from their first marriage would go after their assets and property.  Attempted to redirect wife to focus on medical concerns during our conversation, but with limited success.    I counseled wife that I agree with her assessment that patient likely would be a poor candidate for any type of cancer directed treatment, and that he is hospice eligible with a likely prognosis of weeks to < 2-3 months.  I advised her that deferring further biopsy and work-up would be reasonable, but also to discuss with Dr. Pugh.  Introduced hospice philosophy and services; wife agreed to referral to [facility] to further explore options for LTC placement with hospice.  Discussed advanced directives, including risks/burdens/benefits of allowing natural death vs full resuscitation.  Wife counseled that in the event of a cardiac event requiring CPR, patient would be unlikely to survive or to maintain even his current neurological status, and that burdens of CPR (rib fractures, pain, long term ventilatory dependence) would likely outweigh any short term benefit.  Wife voiced understanding, but still wishes for patient to remain Full Code at this time, pending further discussions with her family.  Wife was appreciative of the conversation, and all of her questions were answered. Face to face family meeting held with wife at bedside x 30 minutes (as separately identifiable service) to discuss prognosis, ACP, goals of care, and treatment preferences.  Hospitalist Dr. Soriano also participated in the discussion.  Wife is aware that of possible diagnosis of sarcoma and that patient likely had advanced metastatic cancer.  She recounted how patient "never wanted to go to the doctor" and how he has had a rapid functional and clinical decline over the last 3 months.  She expressed some frustration at patient being unable to complete the MRI, states that sedation wasn't given enough time to work.  At the same she sees how weak the patient has become, how rapidly he is declining, and does not see how he could be a candidate for any type of cancer directed therapy.  She thinks he is medically ready for hospice, but states that she is unable to care for him at home.  She also is already preoccupied about his will, about whether his ex-wife and three children from their first marriage would go after their assets and property.  Attempted to redirect wife to focus on medical concerns during our conversation, but with limited success.    I counseled wife that I agree with her assessment that patient likely would be a poor candidate for any type of cancer directed treatment, and that he is hospice eligible with a likely prognosis of weeks to < 2-3 months.  I advised her that deferring further biopsy and work-up would be reasonable, but also to discuss with Dr. Pugh.  Introduced hospice philosophy and services; wife agreed to referral to [facility] to further explore options for LTC placement with hospice.  Discussed advanced directives, including risks/burdens/benefits of allowing natural death vs full resuscitation.  Wife counseled that in the event of a cardiac event requiring CPR, patient would be unlikely to survive or to maintain even his current neurological status, and that burdens of CPR (rib fractures, pain, long term ventilatory dependence) would likely outweigh any short term benefit.  Wife voiced understanding, but still wishes for patient to remain Full Code at this time, pending further discussions with her family.  Wife was appreciative of the conversation, and all of her questions were answered.

## 2024-01-10 NOTE — PROGRESS NOTE ADULT - SUBJECTIVE AND OBJECTIVE BOX
NP Note discussed with  Primary Attending    Patient is a 83y old  Male who presents with a chief complaint of frequent falls, weakness (10 Rich 2024 11:16)      INTERVAL HPI/OVERNIGHT EVENTS:  83 y.o M w/ PMHx of BPH w/ urinary retention, stroke (2013), emphysema, afib not on ac, dementia, erectile dysfunction, vertigo, leukocytosis of unknown origin (currently being worked up at [facility] Dr. 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 Wife states that he has vertigo and balance issues, but he has also been very weak, especially in the past three months. Pt endorses losing 60 pounds in the past 1.5 years, and wife endorses that he has had decreased appetite for the past 3 months. Pt also has intermittent night sweats.Labs significant for elevated WBC. CTH shows no changes from CTH in 11/2023. Xray show no fractures. CXR shows scattered non-specific pulmonary infiltrates. Admitted for ambulatory dysfunction and malignancy workup    MEDICATIONS  (STANDING):  aspirin  chewable 81 milliGRAM(s) Oral daily  chlorhexidine 2% Cloths 1 Application(s) Topical <User Schedule>  donepezil 5 milliGRAM(s) Oral at bedtime  enoxaparin Injectable 40 milliGRAM(s) SubCutaneous every 24 hours  melatonin 3 milliGRAM(s) Oral at bedtime  mupirocin 2% Ointment 1 Application(s) Both Nostrils two times a day  sodium chloride 0.9%. 1000 milliLiter(s) (100 mL/Hr) IV Continuous <Continuous>  sodium chloride 0.9%. 1000 milliLiter(s) (100 mL/Hr) IV Continuous <Continuous>  sodium chloride 0.9%. 1000 milliLiter(s) (100 mL/Hr) IV Continuous <Continuous>  tamsulosin 0.4 milliGRAM(s) Oral at bedtime    MEDICATIONS  (PRN):      __________________________________________________  REVIEW OF SYSTEMS:    CONSTITUTIONAL: No fever,   EYES: no acute visual disturbances  NECK: No pain or stiffness  RESPIRATORY: No cough; No shortness of breath  CARDIOVASCULAR: No chest pain, no palpitations  GASTROINTESTINAL: No pain. No nausea or vomiting; No diarrhea   NEUROLOGICAL: No headache or numbness, no tremors  MUSCULOSKELETAL: No joint pain, no muscle pain  GENITOURINARY: no dysuria, no frequency, no hesitancy  PSYCHIATRY: no depression , no anxiety  ALL OTHER  ROS negative        Vital Signs Last 24 Hrs  T(C): 36.6 (10 Rich 2024 14:39), Max: 36.8 (10 Rich 2024 05:24)  T(F): 97.9 (10 Rich 2024 14:39), Max: 98.2 (10 Rich 2024 05:24)  HR: 88 (10 Rich 2024 14:39) (85 - 96)  BP: 95/50 (10 Rich 2024 14:39) (95/50 - 108/67)  BP(mean): 65 (10 Rich 2024 10:00) (65 - 80)  RR: 20 (10 Rich 2024 14:39) (18 - 20)  SpO2: 97% (10 Rich 2024 14:39) (91% - 97%)    Parameters below as of 10 Rich 2024 14:39  Patient On (Oxygen Delivery Method): room air        ________________________________________________  PHYSICAL EXAM:  GENERAL: NAD  HEENT: Normocephalic;  conjunctivae and sclerae clear; moist mucous membranes;   NECK : supple  CHEST/LUNG: Clear to auscultation bilaterally with good air entry   HEART: S1 S2  regular; no murmurs, gallops or rubs  ABDOMEN: Soft, Nontender, Nondistended; Bowel sounds present  EXTREMITIES: no cyanosis; no edema; no calf tenderness  SKIN: warm and dry; no rash  NERVOUS SYSTEM:  Awake and alert; Oriented  to place, person and time ; no new deficits    _________________________________________________  LABS:                        8.1    45.95 )-----------( 356      ( 10 Rich 2024 07:06 )             26.4     01-10    141  |  108  |  25<H>  ----------------------------<  96  4.0   |  25  |  0.82    Ca    11.1<H>      10 Rich 2024 07:06        Urinalysis Basic - ( 10 Rich 2024 07:06 )    Color: x / Appearance: x / SG: x / pH: x  Gluc: 96 mg/dL / Ketone: x  / Bili: x / Urobili: x   Blood: x / Protein: x / Nitrite: x   Leuk Esterase: x / RBC: x / WBC x   Sq Epi: x / Non Sq Epi: x / Bacteria: x      CAPILLARY BLOOD GLUCOSE      POCT Blood Glucose.: 112 mg/dL (10 Rich 2024 09:57)        RADIOLOGY & ADDITIONAL TESTS:    Imaging Personally Reviewed:  YES/NO    Consultant(s) Notes Reviewed:   YES/ No    Care Discussed with Consultants :     Plan of care was discussed with patient and /or primary care giver; all questions and concerns were addressed and care was aligned with patient's wishes.     NP Note discussed with  Primary Attending    Patient is a 83y old  Male who presents with a chief complaint of frequent falls, weakness (10 Rich 2024 11:16)      INTERVAL HPI/OVERNIGHT EVENTS:  83 y.o M w/ PMHx of BPH w/ urinary retention, stroke (2013), emphysema, afib not on ac, dementia, erectile dysfunction, vertigo, leukocytosis of unknown origin (currently being worked up at [facility] Dr. 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 Wife states that he has vertigo and balance issues, but he has also been very weak, especially in the past three months. Pt endorses losing 60 pounds in the past 1.5 years, and wife endorses that he has had decreased appetite for the past 3 months. Pt also has intermittent night sweats.Labs significant for elevated WBC. CTH shows no changes from CTH in 11/2023. Xray show no fractures. CXR shows scattered non-specific pulmonary infiltrates. Admitted for ambulatory dysfunction and malignancy workup    MEDICATIONS  (STANDING):  aspirin  chewable 81 milliGRAM(s) Oral daily  chlorhexidine 2% Cloths 1 Application(s) Topical <User Schedule>  donepezil 5 milliGRAM(s) Oral at bedtime  enoxaparin Injectable 40 milliGRAM(s) SubCutaneous every 24 hours  melatonin 3 milliGRAM(s) Oral at bedtime  mupirocin 2% Ointment 1 Application(s) Both Nostrils two times a day  sodium chloride 0.9%. 1000 milliLiter(s) (100 mL/Hr) IV Continuous <Continuous>  sodium chloride 0.9%. 1000 milliLiter(s) (100 mL/Hr) IV Continuous <Continuous>  sodium chloride 0.9%. 1000 milliLiter(s) (100 mL/Hr) IV Continuous <Continuous>  tamsulosin 0.4 milliGRAM(s) Oral at bedtime    MEDICATIONS  (PRN):      __________________________________________________  REVIEW OF SYSTEMS:    CONSTITUTIONAL: No fever,   EYES: no acute visual disturbances  NECK: No pain or stiffness  RESPIRATORY: No cough; No shortness of breath  CARDIOVASCULAR: No chest pain, no palpitations  GASTROINTESTINAL: No pain. No nausea or vomiting; No diarrhea   NEUROLOGICAL: No headache or numbness, no tremors  MUSCULOSKELETAL: No joint pain, no muscle pain  GENITOURINARY: no dysuria, no frequency, no hesitancy  PSYCHIATRY: no depression , no anxiety  ALL OTHER  ROS negative        Vital Signs Last 24 Hrs  T(C): 36.6 (10 Rich 2024 14:39), Max: 36.8 (10 Rich 2024 05:24)  T(F): 97.9 (10 Rich 2024 14:39), Max: 98.2 (10 Rich 2024 05:24)  HR: 88 (10 Rich 2024 14:39) (85 - 96)  BP: 95/50 (10 Rich 2024 14:39) (95/50 - 108/67)  BP(mean): 65 (10 Rcih 2024 10:00) (65 - 80)  RR: 20 (10 Rich 2024 14:39) (18 - 20)  SpO2: 97% (10 Rich 2024 14:39) (91% - 97%)    Parameters below as of 10 Rich 2024 14:39  Patient On (Oxygen Delivery Method): room air        ________________________________________________  PHYSICAL EXAM:  GENERAL: NAD  HEENT: Normocephalic;  conjunctivae and sclerae clear; moist mucous membranes;   NECK : supple  CHEST/LUNG: Clear to auscultation bilaterally with good air entry   HEART: S1 S2  regular; no murmurs, gallops or rubs  ABDOMEN: Soft, Nontender, Nondistended; Bowel sounds present  EXTREMITIES: no cyanosis; no edema; no calf tenderness  SKIN: warm and dry; no rash  NERVOUS SYSTEM:  Awake and alert; Oriented  to place, person and time ; no new deficits    _________________________________________________  LABS:                        8.1    45.95 )-----------( 356      ( 10 Rich 2024 07:06 )             26.4     01-10    141  |  108  |  25<H>  ----------------------------<  96  4.0   |  25  |  0.82    Ca    11.1<H>      10 Rich 2024 07:06        Urinalysis Basic - ( 10 Rich 2024 07:06 )    Color: x / Appearance: x / SG: x / pH: x  Gluc: 96 mg/dL / Ketone: x  / Bili: x / Urobili: x   Blood: x / Protein: x / Nitrite: x   Leuk Esterase: x / RBC: x / WBC x   Sq Epi: x / Non Sq Epi: x / Bacteria: x      CAPILLARY BLOOD GLUCOSE      POCT Blood Glucose.: 112 mg/dL (10 Rich 2024 09:57)        RADIOLOGY & ADDITIONAL TESTS:    Imaging Personally Reviewed:  YES/NO    Consultant(s) Notes Reviewed:   YES/ No    Care Discussed with Consultants :     Plan of care was discussed with patient and /or primary care giver; all questions and concerns were addressed and care was aligned with patient's wishes.     NP Note discussed with  Primary Attending    Patient is a 83y old  Male who presents with a chief complaint of frequent falls, weakness (10 Rich 2024 11:16)      INTERVAL HPI/OVERNIGHT EVENTS:  83 y.o M w/ PMHx of BPH w/ urinary retention, stroke (2013), emphysema, afib not on ac, dementia, erectile dysfunction, vertigo, leukocytosis of unknown origin (currently being worked up at [facility] Dr. 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 Wife states that he has vertigo and balance issues, but he has also been very weak, especially in the past three months. Pt endorses losing 60 pounds in the past 1.5 years, and wife endorses that he has had decreased appetite for the past 3 months. Pt also has intermittent night sweats. Labs significant for elevated WBC. CTH shows no changes from CTH in 11/2023. Xray show no fractures. CXR shows scattered non-specific pulmonary infiltrates. Admitted for ambulatory dysfunction and malignancy workup    Surgery was consulted -- Given inpatient w/u and history of declining mental status w/increasing frailty in setting of likely multifocal malignancy vs metastatic burden, excisional biopsy of RLE mass unlikely to provide any clinical benefit and has subsequently been cancelled. Recommended MRI RLE for possible percutaneous core needle bx by IR, however pt was unable to tolerate MRI with sedation. Sx onc sign off    Hem/onc following -- ·Concern for sarcoma. Patient may also have a prostate primary given elevated PSA (though < 10) with recent MRI of the prostate PIRADS-5. Patient is not a surgical candidate for excision. Urology consulted for BPH/elevated PSA with no acute urologic intervention as inpatient. Cardiology also following with no further inpatient cardiac work up needed. Palliative consulted -- F/U recs    PT recs ABRAHAM      MEDICATIONS  (STANDING):  aspirin  chewable 81 milliGRAM(s) Oral daily  chlorhexidine 2% Cloths 1 Application(s) Topical <User Schedule>  donepezil 5 milliGRAM(s) Oral at bedtime  enoxaparin Injectable 40 milliGRAM(s) SubCutaneous every 24 hours  melatonin 3 milliGRAM(s) Oral at bedtime  mupirocin 2% Ointment 1 Application(s) Both Nostrils two times a day  sodium chloride 0.9%. 1000 milliLiter(s) (100 mL/Hr) IV Continuous <Continuous>  sodium chloride 0.9%. 1000 milliLiter(s) (100 mL/Hr) IV Continuous <Continuous>  sodium chloride 0.9%. 1000 milliLiter(s) (100 mL/Hr) IV Continuous <Continuous>  tamsulosin 0.4 milliGRAM(s) Oral at bedtime    MEDICATIONS  (PRN):      __________________________________________________  REVIEW OF SYSTEMS:    CONSTITUTIONAL: No fever,   EYES: no acute visual disturbances  NECK: No pain or stiffness  RESPIRATORY: No cough; No shortness of breath  CARDIOVASCULAR: No chest pain, no palpitations  GASTROINTESTINAL: No pain. No nausea or vomiting; No diarrhea   NEUROLOGICAL: No headache or numbness, no tremors  MUSCULOSKELETAL: No joint pain, no muscle pain  GENITOURINARY: no dysuria, no frequency, no hesitancy  PSYCHIATRY: no depression , no anxiety  ALL OTHER  ROS negative        Vital Signs Last 24 Hrs  T(C): 36.6 (10 Rich 2024 14:39), Max: 36.8 (10 Rich 2024 05:24)  T(F): 97.9 (10 Rich 2024 14:39), Max: 98.2 (10 Rich 2024 05:24)  HR: 88 (10 Rich 2024 14:39) (85 - 96)  BP: 95/50 (10 Rich 2024 14:39) (95/50 - 108/67)  BP(mean): 65 (10 Rich 2024 10:00) (65 - 80)  RR: 20 (10 Rich 2024 14:39) (18 - 20)  SpO2: 97% (10 Rich 2024 14:39) (91% - 97%)    Parameters below as of 10 Rich 2024 14:39  Patient On (Oxygen Delivery Method): room air        ________________________________________________  PHYSICAL EXAM:  GENERAL: NAD  HEENT: Normocephalic;  conjunctivae and sclerae clear; moist mucous membranes;   NECK : supple  CHEST/LUNG: Clear to auscultation bilaterally with good air entry   HEART: S1 S2  regular; no murmurs, gallops or rubs  ABDOMEN: Soft, Nontender, Nondistended; Bowel sounds present  EXTREMITIES: no cyanosis; no edema; no calf tenderness  SKIN: warm and dry; no rash  NERVOUS SYSTEM:  Awake and alert; Oriented  to place, person and time ; no new deficits    _________________________________________________  LABS:                        8.1    45.95 )-----------( 356      ( 10 Rich 2024 07:06 )             26.4     01-10    141  |  108  |  25<H>  ----------------------------<  96  4.0   |  25  |  0.82    Ca    11.1<H>      10 Rich 2024 07:06        Urinalysis Basic - ( 10 Rich 2024 07:06 )    Color: x / Appearance: x / SG: x / pH: x  Gluc: 96 mg/dL / Ketone: x  / Bili: x / Urobili: x   Blood: x / Protein: x / Nitrite: x   Leuk Esterase: x / RBC: x / WBC x   Sq Epi: x / Non Sq Epi: x / Bacteria: x      CAPILLARY BLOOD GLUCOSE      POCT Blood Glucose.: 112 mg/dL (10 Rich 2024 09:57)        RADIOLOGY & ADDITIONAL TESTS:    Imaging Personally Reviewed:  YES/NO    Consultant(s) Notes Reviewed:   YES/ No    Care Discussed with Consultants :     Plan of care was discussed with patient and /or primary care giver; all questions and concerns were addressed and care was aligned with patient's wishes.     NP Note discussed with  Primary Attending    Patient is a 83y old  Male who presents with a chief complaint of frequent falls, weakness (10 Rich 2024 11:16)      INTERVAL HPI/OVERNIGHT EVENTS:  83 y.o M w/ PMHx of BPH w/ urinary retention, stroke (2013), emphysema, afib not on ac, dementia, erectile dysfunction, vertigo, leukocytosis of unknown origin (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 Wife states that he has vertigo and balance issues, but he has also been very weak, especially in the past three months. Pt endorses losing 60 pounds in the past 1.5 years, and wife endorses that he has had decreased appetite for the past 3 months. Pt also has intermittent night sweats. Labs significant for elevated WBC. CTH shows no changes from CTH in 11/2023. Xray show no fractures. CXR shows scattered non-specific pulmonary infiltrates. Admitted for ambulatory dysfunction and malignancy workup    Surgery was consulted -- Given inpatient w/u and history of declining mental status w/increasing frailty in setting of likely multifocal malignancy vs metastatic burden, excisional biopsy of RLE mass unlikely to provide any clinical benefit and has subsequently been cancelled. Recommended MRI RLE for possible percutaneous core needle bx by IR, however pt was unable to tolerate MRI with sedation. Sx onc sign off    Hem/onc following -- ·Concern for sarcoma. Patient may also have a prostate primary given elevated PSA (though < 10) with recent MRI of the prostate PIRADS-5. Patient is not a surgical candidate for excision. Urology consulted for BPH/elevated PSA with no acute urologic intervention as inpatient. Cardiology also following with no further inpatient cardiac work up needed. Palliative consulted -- F/U recs    PT recs ABRAHAM      MEDICATIONS  (STANDING):  aspirin  chewable 81 milliGRAM(s) Oral daily  chlorhexidine 2% Cloths 1 Application(s) Topical <User Schedule>  donepezil 5 milliGRAM(s) Oral at bedtime  enoxaparin Injectable 40 milliGRAM(s) SubCutaneous every 24 hours  melatonin 3 milliGRAM(s) Oral at bedtime  mupirocin 2% Ointment 1 Application(s) Both Nostrils two times a day  sodium chloride 0.9%. 1000 milliLiter(s) (100 mL/Hr) IV Continuous <Continuous>  sodium chloride 0.9%. 1000 milliLiter(s) (100 mL/Hr) IV Continuous <Continuous>  sodium chloride 0.9%. 1000 milliLiter(s) (100 mL/Hr) IV Continuous <Continuous>  tamsulosin 0.4 milliGRAM(s) Oral at bedtime    MEDICATIONS  (PRN):      __________________________________________________  REVIEW OF SYSTEMS:    CONSTITUTIONAL: No fever,   EYES: no acute visual disturbances  NECK: No pain or stiffness  RESPIRATORY: No cough; No shortness of breath  CARDIOVASCULAR: No chest pain, no palpitations  GASTROINTESTINAL: No pain. No nausea or vomiting; No diarrhea   NEUROLOGICAL: No headache or numbness, no tremors  MUSCULOSKELETAL: No joint pain, no muscle pain  GENITOURINARY: no dysuria, no frequency, no hesitancy  PSYCHIATRY: no depression , no anxiety  ALL OTHER  ROS negative        Vital Signs Last 24 Hrs  T(C): 36.6 (10 Rich 2024 14:39), Max: 36.8 (10 Rich 2024 05:24)  T(F): 97.9 (10 Rich 2024 14:39), Max: 98.2 (10 Rich 2024 05:24)  HR: 88 (10 Rich 2024 14:39) (85 - 96)  BP: 95/50 (10 Rich 2024 14:39) (95/50 - 108/67)  BP(mean): 65 (10 Rich 2024 10:00) (65 - 80)  RR: 20 (10 Rich 2024 14:39) (18 - 20)  SpO2: 97% (10 Rich 2024 14:39) (91% - 97%)    Parameters below as of 10 Rich 2024 14:39  Patient On (Oxygen Delivery Method): room air        ________________________________________________  PHYSICAL EXAM:  GENERAL: NAD  HEENT: Normocephalic;  conjunctivae and sclerae clear; moist mucous membranes;   NECK : supple  CHEST/LUNG: Clear to auscultation bilaterally with good air entry   HEART: S1 S2  regular; no murmurs, gallops or rubs  ABDOMEN: Soft, Nontender, Nondistended; Bowel sounds present  EXTREMITIES: no cyanosis; no edema; no calf tenderness  SKIN: warm and dry; no rash  NERVOUS SYSTEM:  Awake and alert; Oriented  to place, person and time ; no new deficits    _________________________________________________  LABS:                        8.1    45.95 )-----------( 356      ( 10 Rich 2024 07:06 )             26.4     01-10    141  |  108  |  25<H>  ----------------------------<  96  4.0   |  25  |  0.82    Ca    11.1<H>      10 Rich 2024 07:06        Urinalysis Basic - ( 10 Rich 2024 07:06 )    Color: x / Appearance: x / SG: x / pH: x  Gluc: 96 mg/dL / Ketone: x  / Bili: x / Urobili: x   Blood: x / Protein: x / Nitrite: x   Leuk Esterase: x / RBC: x / WBC x   Sq Epi: x / Non Sq Epi: x / Bacteria: x      CAPILLARY BLOOD GLUCOSE      POCT Blood Glucose.: 112 mg/dL (10 Rich 2024 09:57)        RADIOLOGY & ADDITIONAL TESTS:    Imaging Personally Reviewed:  YES/NO    Consultant(s) Notes Reviewed:   YES/ No    Care Discussed with Consultants :     Plan of care was discussed with patient and /or primary care giver; all questions and concerns were addressed and care was aligned with patient's wishes.     NP Note discussed with  Primary Attending    Patient is a 83y old  Male who presents with a chief complaint of frequent falls, weakness (10 Rich 2024 11:16)      INTERVAL HPI/OVERNIGHT EVENTS:  83 y.o M w/ PMHx of BPH w/ urinary retention, stroke (2013), emphysema, afib not on ac, dementia, erectile dysfunction, vertigo, leukocytosis of unknown origin (currently being worked up at [facility] Dr. 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 Wife states that he has vertigo and balance issues, but he has also been very weak, especially in the past three months. Pt endorses losing 60 pounds in the past 1.5 years, and wife endorses that he has had decreased appetite for the past 3 months. Pt also has intermittent night sweats. Labs significant for elevated WBC. CTH shows no changes from CTH in 11/2023. Xray show no fractures. CXR shows scattered non-specific pulmonary infiltrates. Admitted for ambulatory dysfunction and malignancy workup    Surgery was consulted -- Given inpatient w/u and history of declining mental status w/increasing frailty in setting of likely multifocal malignancy vs metastatic burden, excisional biopsy of RLE mass unlikely to provide any clinical benefit and has subsequently been cancelled. Recommended MRI RLE for possible percutaneous core needle bx by IR, however pt was unable to tolerate MRI with sedation. Sx onc sign off    Hem/onc following -- ·Concern for sarcoma. Patient may also have a prostate primary given elevated PSA (though < 10) with recent MRI of the prostate PIRADS-5. Patient is not a surgical candidate for excision. Urology consulted for BPH/elevated PSA with no acute urologic intervention as inpatient. Cardiology also following with no further inpatient cardiac work up needed. Palliative consulted -- F/U recs    PT recs ABRAHAM      MEDICATIONS  (STANDING):  aspirin  chewable 81 milliGRAM(s) Oral daily  chlorhexidine 2% Cloths 1 Application(s) Topical <User Schedule>  donepezil 5 milliGRAM(s) Oral at bedtime  enoxaparin Injectable 40 milliGRAM(s) SubCutaneous every 24 hours  melatonin 3 milliGRAM(s) Oral at bedtime  mupirocin 2% Ointment 1 Application(s) Both Nostrils two times a day  sodium chloride 0.9%. 1000 milliLiter(s) (100 mL/Hr) IV Continuous <Continuous>  sodium chloride 0.9%. 1000 milliLiter(s) (100 mL/Hr) IV Continuous <Continuous>  sodium chloride 0.9%. 1000 milliLiter(s) (100 mL/Hr) IV Continuous <Continuous>  tamsulosin 0.4 milliGRAM(s) Oral at bedtime    MEDICATIONS  (PRN):      __________________________________________________  REVIEW OF SYSTEMS:    Unable to assess due to poor mental status  ALL OTHER  ROS negative        Vital Signs Last 24 Hrs  T(C): 36.6 (10 Rich 2024 14:39), Max: 36.8 (10 Rich 2024 05:24)  T(F): 97.9 (10 Rich 2024 14:39), Max: 98.2 (10 Rich 2024 05:24)  HR: 88 (10 Rich 2024 14:39) (85 - 96)  BP: 95/50 (10 Rich 2024 14:39) (95/50 - 108/67)  BP(mean): 65 (10 Rich 2024 10:00) (65 - 80)  RR: 20 (10 Rich 2024 14:39) (18 - 20)  SpO2: 97% (10 Rich 2024 14:39) (91% - 97%)    Parameters below as of 10 Rich 2024 14:39  Patient On (Oxygen Delivery Method): room air        ________________________________________________  PHYSICAL EXAM:  GENERAL: NAD, frail  HEENT: Normocephalic;  conjunctivae and sclerae clear; moist mucous membranes;   NECK : supple  CHEST/LUNG: Clear to auscultation bilaterally with good air entry   HEART: S1 S2  regular;  ABDOMEN: Soft, Nontender, Nondistended; Bowel sounds present  EXTREMITIES: no cyanosis; no edema; no calf tenderness  SKIN: warm and dry; no rash  NERVOUS SYSTEM:  Lethargic    _________________________________________________  LABS:                        8.1    45.95 )-----------( 356      ( 10 Rich 2024 07:06 )             26.4     01-10    141  |  108  |  25<H>  ----------------------------<  96  4.0   |  25  |  0.82    Ca    11.1<H>      10 Rich 2024 07:06        Urinalysis Basic - ( 10 Rich 2024 07:06 )    Color: x / Appearance: x / SG: x / pH: x  Gluc: 96 mg/dL / Ketone: x  / Bili: x / Urobili: x   Blood: x / Protein: x / Nitrite: x   Leuk Esterase: x / RBC: x / WBC x   Sq Epi: x / Non Sq Epi: x / Bacteria: x      CAPILLARY BLOOD GLUCOSE      POCT Blood Glucose.: 112 mg/dL (10 Rich 2024 09:57)        RADIOLOGY & ADDITIONAL TESTS:  < from: CT Abdomen and Pelvis w/ IV Cont (01.07.24 @ 13:41) >    ACC: [id number] EXAM:  CT ABDOMEN AND PELVIS IC   ORDERED BY: JACKI JARQUIN     ACC: [id number] EXAM:  CT CHEST IC   ORDERED BY: JACKI JARQUIN     PROCEDURE DATE:  01/07/2024          INTERPRETATION:  CLINICAL INFORMATION: Right leg malignancy.    COMPARISON: Outside contrast-enhanced CT of the thorax, abdomen, and   pelvis November 6, 2023.    CONTRAST/COMPLICATIONS:  IV Contrast: Omnipaque 350 (accession [id number]), IV contrast documented   in unlinked concurrent exam (accession [id number] cc administered   10   cc discarded  Oral Contrast: NONE  Complications: None reported at time of study completion    PROCEDURE:  CT of the Chest, Abdomen and Pelvis was performed.  Sagittal and coronal reformats were performed.    FINDINGS:  CHEST:  LUNGS AND LARGE AIRWAYS: Patent central airways. There is been interval   increase in size and number of numerous centrally necrotic lung masses   measuring up to 3.7 cm in the left lower lobe compatible with metastatic   disease. Mild right lowerlobe dependent atelectasis.  PLEURA: Trace bilateral layering pleural effusions, right greater than   left.  VESSELS: Within normal limits.  HEART: Heart size is normal. No pericardial effusion.  MEDIASTINUM AND REMY: No lymphadenopathy.  CHEST WALL AND LOWER NECK: Within normal limits.    ABDOMEN AND PELVIS:  LIVER: Within normal limits.  BILE DUCTS: Normal caliber.  GALLBLADDER: Within normal limits.  SPLEEN: Within normal limits.  PANCREAS: Within normal limits.  ADRENALS: Within normal limits.  KIDNEYS/URETERS: Left renal cysts. Otherwise, within normal limits.    BLADDER: Within normal limits.  REPRODUCTIVE ORGANS: The prostate is not enlarged.    BOWEL: Colonic diverticulosis. No bowel obstruction. Appendix is normal.   There is no mesenteric adenopathy.  PERITONEUM: No ascites.  VESSELS: Within normal limits.  RETROPERITONEUM/LYMPH NODES: No lymphadenopathy.  ABDOMINAL WALL: Small fat-containing left inguinal hernia.  BONES: Mild anterolisthesis of L4 on L5. Degenerative disc disease at   L5-S1.. There is been interval increase in size of an incompletely   visualized peripherally enhancing mass in the right medial thigh.    IMPRESSION: Extensive progressive metastatic burden to the lungs.    --- End of Report ---            ARLIN XIAO MD; Attending Radiologist  This document has been electronically signed. Jan 7 2024  5:10PM    < end of copied text >    Imaging Personally Reviewed:  YES/NO    Consultant(s) Notes Reviewed:   YES/ No    Care Discussed with Consultants :     Plan of care was discussed with patient and /or primary care giver; all questions and concerns were addressed and care was aligned with patient's wishes.     NP Note discussed with  Primary Attending    Patient is a 83y old  Male who presents with a chief complaint of frequent falls, weakness (10 Rich 2024 11:16)      INTERVAL HPI/OVERNIGHT EVENTS:  83 y.o M w/ PMHx of BPH w/ urinary retention, stroke (2013), emphysema, afib not on ac, dementia, erectile dysfunction, vertigo, leukocytosis of unknown origin (currently being worked up at [facility] Dr. 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 Wife states that he has vertigo and balance issues, but he has also been very weak, especially in the past three months. Pt endorses losing 60 pounds in the past 1.5 years, and wife endorses that he has had decreased appetite for the past 3 months. Pt also has intermittent night sweats. Labs significant for elevated WBC. CTH shows no changes from CTH in 11/2023. Xray show no fractures. CXR shows scattered non-specific pulmonary infiltrates. Admitted for ambulatory dysfunction and malignancy workup    Surgery was consulted -- Given inpatient w/u and history of declining mental status w/increasing frailty in setting of likely multifocal malignancy vs metastatic burden, excisional biopsy of RLE mass unlikely to provide any clinical benefit and has subsequently been cancelled. Recommended MRI RLE for possible percutaneous core needle bx by IR, however pt was unable to tolerate MRI with sedation. Sx onc sign off    Hem/onc following -- ·Concern for sarcoma. Patient may also have a prostate primary given elevated PSA (though < 10) with recent MRI of the prostate PIRADS-5. Patient is not a surgical candidate for excision. Urology consulted for BPH/elevated PSA with no acute urologic intervention as inpatient. Cardiology also following with no further inpatient cardiac work up needed. Palliative consulted -- F/U recs    PT recs [facility]      MEDICATIONS  (STANDING):  aspirin  chewable 81 milliGRAM(s) Oral daily  chlorhexidine 2% Cloths 1 Application(s) Topical <User Schedule>  donepezil 5 milliGRAM(s) Oral at bedtime  enoxaparin Injectable 40 milliGRAM(s) SubCutaneous every 24 hours  melatonin 3 milliGRAM(s) Oral at bedtime  mupirocin 2% Ointment 1 Application(s) Both Nostrils two times a day  sodium chloride 0.9%. 1000 milliLiter(s) (100 mL/Hr) IV Continuous <Continuous>  sodium chloride 0.9%. 1000 milliLiter(s) (100 mL/Hr) IV Continuous <Continuous>  sodium chloride 0.9%. 1000 milliLiter(s) (100 mL/Hr) IV Continuous <Continuous>  tamsulosin 0.4 milliGRAM(s) Oral at bedtime    MEDICATIONS  (PRN):      __________________________________________________  REVIEW OF SYSTEMS:    Unable to assess due to poor mental status  ALL OTHER  ROS negative        Vital Signs Last 24 Hrs  T(C): 36.6 (10 Rich 2024 14:39), Max: 36.8 (10 Rich 2024 05:24)  T(F): 97.9 (10 Rich 2024 14:39), Max: 98.2 (10 Rich 2024 05:24)  HR: 88 (10 Rich 2024 14:39) (85 - 96)  BP: 95/50 (10 Rich 2024 14:39) (95/50 - 108/67)  BP(mean): 65 (10 Rich 2024 10:00) (65 - 80)  RR: 20 (10 Rich 2024 14:39) (18 - 20)  SpO2: 97% (10 Rich 2024 14:39) (91% - 97%)    Parameters below as of 10 Rich 2024 14:39  Patient On (Oxygen Delivery Method): room air        ________________________________________________  PHYSICAL EXAM:  GENERAL: NAD, frail  HEENT: Normocephalic;  conjunctivae and sclerae clear; moist mucous membranes;   NECK : supple  CHEST/LUNG: Clear to auscultation bilaterally with good air entry   HEART: S1 S2  regular;  ABDOMEN: Soft, Nontender, Nondistended; Bowel sounds present  EXTREMITIES: no cyanosis; no edema; no calf tenderness  SKIN: warm and dry; no rash  NERVOUS SYSTEM:  Lethargic    _________________________________________________  LABS:                        8.1    45.95 )-----------( 356      ( 10 Rich 2024 07:06 )             26.4     01-10    141  |  108  |  25<H>  ----------------------------<  96  4.0   |  25  |  0.82    Ca    11.1<H>      10 Rich 2024 07:06        Urinalysis Basic - ( 10 Rich 2024 07:06 )    Color: x / Appearance: x / SG: x / pH: x  Gluc: 96 mg/dL / Ketone: x  / Bili: x / Urobili: x   Blood: x / Protein: x / Nitrite: x   Leuk Esterase: x / RBC: x / WBC x   Sq Epi: x / Non Sq Epi: x / Bacteria: x      CAPILLARY BLOOD GLUCOSE      POCT Blood Glucose.: 112 mg/dL (10 Rich 2024 09:57)        RADIOLOGY & ADDITIONAL TESTS:  < from: CT Abdomen and Pelvis w/ IV Cont (01.07.24 @ 13:41) >    ACC: [id number] EXAM:  CT ABDOMEN AND PELVIS IC   ORDERED BY: JACKI JARQUIN     ACC: [id number] EXAM:  CT CHEST IC   ORDERED BY: JACKI JARQUIN     PROCEDURE DATE:  01/07/2024          INTERPRETATION:  CLINICAL INFORMATION: Right leg malignancy.    COMPARISON: Outside contrast-enhanced CT of the thorax, abdomen, and   pelvis November 6, 2023.    CONTRAST/COMPLICATIONS:  IV Contrast: Omnipaque 350 (accession [id number]), IV contrast documented   in unlinked concurrent exam (accession [id number])90 cc administered   10   cc discarded  Oral Contrast: NONE  Complications: None reported at time of study completion    PROCEDURE:  CT of the Chest, Abdomen and Pelvis was performed.  Sagittal and coronal reformats were performed.    FINDINGS:  CHEST:  LUNGS AND LARGE AIRWAYS: Patent central airways. There is been interval   increase in size and number of numerous centrally necrotic lung masses   measuring up to 3.7 cm in the left lower lobe compatible with metastatic   disease. Mild right lowerlobe dependent atelectasis.  PLEURA: Trace bilateral layering pleural effusions, right greater than   left.  VESSELS: Within normal limits.  HEART: Heart size is normal. No pericardial effusion.  MEDIASTINUM AND REMY: No lymphadenopathy.  CHEST WALL AND LOWER NECK: Within normal limits.    ABDOMEN AND PELVIS:  LIVER: Within normal limits.  BILE DUCTS: Normal caliber.  GALLBLADDER: Within normal limits.  SPLEEN: Within normal limits.  PANCREAS: Within normal limits.  ADRENALS: Within normal limits.  KIDNEYS/URETERS: Left renal cysts. Otherwise, within normal limits.    BLADDER: Within normal limits.  REPRODUCTIVE ORGANS: The prostate is not enlarged.    BOWEL: Colonic diverticulosis. No bowel obstruction. Appendix is normal.   There is no mesenteric adenopathy.  PERITONEUM: No ascites.  VESSELS: Within normal limits.  RETROPERITONEUM/LYMPH NODES: No lymphadenopathy.  ABDOMINAL WALL: Small fat-containing left inguinal hernia.  BONES: Mild anterolisthesis of L4 on L5. Degenerative disc disease at   L5-S1.. There is been interval increase in size of an incompletely   visualized peripherally enhancing mass in the right medial thigh.    IMPRESSION: Extensive progressive metastatic burden to the lungs.    --- End of Report ---            ARLIN XIAO MD; Attending Radiologist  This document has been electronically signed. Jan 7 2024  5:10PM    < end of copied text >    Imaging Personally Reviewed:  YES/NO    Consultant(s) Notes Reviewed:   YES/ No    Care Discussed with Consultants :     Plan of care was discussed with patient and /or primary care giver; all questions and concerns were addressed and care was aligned with patient's wishes.

## 2024-01-10 NOTE — PROGRESS NOTE ADULT - NS ATTEND AMEND GEN_ALL_CORE FT
Patient seen at bedside, nonverbal. ROS unable to obtain. No increased WOB.  On exam, patient is AOx0, somnolent, grimaces on painful stimuli, cardiopulmonary exams unremarkable, abdomen soft, NT/ND, Has bilateral knee tenderness. Has R knee subcutaneous mass.  Labs reviewed, CBC with high WBC and anemia (unchanged), BMP with normal Cr, Ca 11.2->11.1.      Assessment and plan:  83 y.o M w/ PMHx of BPH w/ urinary retention, stroke (2013), emphysema, afib not on AC, dementia, erectile dysfunction, vertigo, leukocytosis of unknown origin (currently being worked up at [facility] Dr. 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 Labs significant for elevated WBC. CTH shows no changes from CTH in 11/2023. Xray show no fractures. CXR shows scattered non-specific pulmonary infiltrates. Admitted for ambulatory dysfunction and malignancy workup. Further workup shows multiple nodules in the lungs suggestive of metastases. Oncology consulted, recommending biopsy for further guidance of treatment, but patient is not considered a candidate for conventional chemotherapy given the age, low functional status and comorbidities. Surgery recommends needle core biopsy as the risk of surgical biopsy is deemed to overweigh the benefit. Attempted MRI but unable to obtain the imaging even with ativan as patient became restless.    # 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    # Lung nodules suspicious of malignancy   # Enlarged prostate, concerning for malignancy   # Leukocytosis, likely due to malignancy, ruled out leukemia with BM biopsy    # Anemia   # Delirium, possible hospital onset delirium; unlikely infection, CT head neg, electrolytes stable- resolved   # Hypercalcemia of malignancy   # Renal complex mass   # Abnormal EKG, appearing  2/2 wondering atrial pacemaker   # Atrial fibrillation not on AC   # hx of CVA, cerebellar infarct    # Emphysema    # Severe protein calorie malnutrition     - heme/onc recs appreciated   - GOC discussion held with primary team, palliative care Dr. Rodas, and patient's wife Sadie, details in a separate note; wife would like to discuss with Dr. Wilson and family to make a decision, but overall thinks that he would want comfort care  - continue NS 100cc/h for hyperCa   - DVT ppx: Lovenox Patient seen at bedside, nonverbal. ROS unable to obtain. No increased WOB.  On exam, patient is AOx0, somnolent, grimaces on painful stimuli, cardiopulmonary exams unremarkable, abdomen soft, NT/ND, Has bilateral knee tenderness. Has R knee subcutaneous mass.  Labs reviewed, CBC with high WBC and anemia (unchanged), BMP with normal Cr, Ca 11.2->11.1.      Assessment and plan:  83 y.o M w/ PMHx of BPH w/ urinary retention, stroke (2013), emphysema, afib not on AC, dementia, erectile dysfunction, vertigo, leukocytosis of unknown origin (currently being worked up at [facility] Dr. 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 Labs significant for elevated WBC. CTH shows no changes from CTH in 11/2023. Xray show no fractures. CXR shows scattered non-specific pulmonary infiltrates. Admitted for ambulatory dysfunction and malignancy workup. Further workup shows multiple nodules in the lungs suggestive of metastases. Oncology consulted, recommending biopsy for further guidance of treatment, but patient is not considered a candidate for conventional chemotherapy given the age, low functional status and comorbidities. Surgery recommends needle core biopsy as the risk of surgical biopsy is deemed to overweigh the benefit. Attempted MRI but unable to obtain the imaging even with ativan as patient became restless.    # 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    # Lung nodules suspicious of malignancy   # Enlarged prostate, concerning for malignancy   # Leukocytosis, likely due to malignancy, ruled out leukemia with BM biopsy    # Anemia   # Delirium, possible hospital onset delirium; unlikely infection, CT head neg, electrolytes stable- resolved   # Hypercalcemia of malignancy   # Renal complex mass   # Abnormal EKG, appearing  2/2 wondering atrial pacemaker   # Atrial fibrillation not on AC   # hx of CVA, cerebellar infarct    # Emphysema    # Severe protein calorie malnutrition     - heme/onc recs appreciated   - GOC discussion held with primary team, palliative care Dr. Rodas, and patient's wife Sadie, details in a separate note; wife would like to discuss with Dr. Wilson and family to make a decision, but overall thinks that he would want comfort care  - continue NS 100cc/h for hyperCa   - DVT ppx: Lovenox

## 2024-01-11 NOTE — PROGRESS NOTE ADULT - SUBJECTIVE AND OBJECTIVE BOX
CHIEF COMPLAINT  Falls    HISTORY OF PRESENT ILLNESS  NIKHIL CASTRO is a 83y Male who presents with a chief complaint of falls.    Subjective:   No acute events. Patient is somnolent.  Unable to answer questions appropriately     REVIEW OF SYSTEMS  Unable - mental status    Allergies    Seafood (Unknown)  No Known Drug Allergies    Intolerances    MEDICATIONS  (STANDING):  aspirin  chewable 81 milliGRAM(s) Oral daily  chlorhexidine 2% Cloths 1 Application(s) Topical <User Schedule>  donepezil 5 milliGRAM(s) Oral at bedtime  enoxaparin Injectable 40 milliGRAM(s) SubCutaneous every 24 hours  melatonin 3 milliGRAM(s) Oral at bedtime  mupirocin 2% Ointment 1 Application(s) Both Nostrils two times a day  tamsulosin 0.4 milliGRAM(s) Oral at bedtime    MEDICATIONS  (PRN):    PHYSICAL EXAM  Vital Signs Last 24 Hrs  T(C): 36.2 (11 Jan 2024 05:00), Max: 36.6 (10 Rich 2024 14:39)  T(F): 97.1 (11 Jan 2024 05:00), Max: 97.9 (10 Rich 2024 14:39)  HR: 100 (11 Jan 2024 05:00) (76 - 100)  BP: 110/72 (11 Jan 2024 05:00) (95/50 - 110/72)  BP(mean): --  RR: 19 (11 Jan 2024 05:00) (19 - 20)  SpO2: 94% (11 Jan 2024 05:00) (92% - 97%)    Parameters below as of 11 Jan 2024 05:00  Patient On (Oxygen Delivery Method): room air    Constitutional: lethargic, in no acute distress  Eyes: PERRL, EOMI  HEENT: normocephalic, atraumatic  Neck: supple, non-tender  Cardiovascular: normal perfusion, tachycardic  Respiratory: normal respiratory efforts; no increased use of accessory muscles  Gastrointestinal: soft, non-tender  Musculoskeletal: normal range of motion, no deformities noted  Skin: warm, dry    LABORATORY DATA                                              8.5    34.93 )-----------( 323      ( 11 Jan 2024 06:08 )             27.7   01-11    144  |  111<H>  |  29<H>  ----------------------------<  94  4.0   |  29  |  0.91    Ca    11.1<H>      11 Jan 2024 06:08  Phos  3.1     01-11  Mg     2.2     01-11    TPro  5.5<L>  /  Alb  1.2<L>  /  TBili  0.8  /  DBili  x   /  AST  29  /  ALT  19  /  AlkPhos  144<H>  01-11

## 2024-01-11 NOTE — PROGRESS NOTE ADULT - PROBLEM SELECTOR PLAN 9
Discharge dispo pending palliative recs  Hospice vs [facility] Discharge dispo pending palliative recs  [facility] vs Hospice

## 2024-01-11 NOTE — PROGRESS NOTE ADULT - SUBJECTIVE AND OBJECTIVE BOX
NP Note discussed with  Primary Attending    Patient is a 83y old  Male who presents with a chief complaint of frequent falls, weakness (11 Jan 2024 11:50)      INTERVAL HPI/OVERNIGHT EVENTS: no new complaints    MEDICATIONS  (STANDING):  aspirin  chewable 81 milliGRAM(s) Oral daily  chlorhexidine 2% Cloths 1 Application(s) Topical <User Schedule>  donepezil 5 milliGRAM(s) Oral at bedtime  enoxaparin Injectable 40 milliGRAM(s) SubCutaneous every 24 hours  melatonin 3 milliGRAM(s) Oral at bedtime  mupirocin 2% Ointment 1 Application(s) Both Nostrils two times a day  tamsulosin 0.4 milliGRAM(s) Oral at bedtime    MEDICATIONS  (PRN):      __________________________________________________  REVIEW OF SYSTEMS:    CONSTITUTIONAL: No fever,   EYES: no acute visual disturbances  NECK: No pain or stiffness  RESPIRATORY: No cough; No shortness of breath  CARDIOVASCULAR: No chest pain, no palpitations  GASTROINTESTINAL: No pain. No nausea or vomiting; No diarrhea   NEUROLOGICAL: No headache or numbness, no tremors  MUSCULOSKELETAL: No joint pain, no muscle pain  GENITOURINARY: no dysuria, no frequency, no hesitancy  PSYCHIATRY: no depression , no anxiety  ALL OTHER  ROS negative        Vital Signs Last 24 Hrs  T(C): 36.5 (11 Jan 2024 12:16), Max: 36.6 (10 Rich 2024 14:39)  T(F): 97.7 (11 Jan 2024 12:16), Max: 97.9 (10 Rich 2024 14:39)  HR: 87 (11 Jan 2024 12:16) (76 - 100)  BP: 111/53 (11 Jan 2024 12:16) (95/50 - 111/53)  BP(mean): 72 (11 Jan 2024 12:16) (72 - 72)  RR: 19 (11 Jan 2024 12:16) (19 - 20)  SpO2: 96% (11 Jan 2024 12:16) (92% - 97%)    Parameters below as of 11 Jan 2024 12:16  Patient On (Oxygen Delivery Method): nasal cannula  O2 Flow (L/min): 2      ________________________________________________  PHYSICAL EXAM:  GENERAL: NAD  HEENT: Normocephalic;  conjunctivae and sclerae clear; moist mucous membranes;   NECK : supple  CHEST/LUNG: Clear to auscultation bilaterally with good air entry   HEART: S1 S2  regular; no murmurs, gallops or rubs  ABDOMEN: Soft, Nontender, Nondistended; Bowel sounds present  EXTREMITIES: no cyanosis; no edema; no calf tenderness  SKIN: warm and dry; no rash  NERVOUS SYSTEM:  Awake and alert; Oriented  to place, person and time ; no new deficits    _________________________________________________  LABS:                        8.5    34.93 )-----------( 323      ( 11 Jan 2024 06:08 )             27.7     01-11    144  |  111<H>  |  29<H>  ----------------------------<  94  4.0   |  29  |  0.91    Ca    11.1<H>      11 Jan 2024 06:08  Phos  3.1     01-11  Mg     2.2     01-11    TPro  5.5<L>  /  Alb  1.2<L>  /  TBili  0.8  /  DBili  x   /  AST  29  /  ALT  19  /  AlkPhos  144<H>  01-11      Urinalysis Basic - ( 11 Jan 2024 06:08 )    Color: x / Appearance: x / SG: x / pH: x  Gluc: 94 mg/dL / Ketone: x  / Bili: x / Urobili: x   Blood: x / Protein: x / Nitrite: x   Leuk Esterase: x / RBC: x / WBC x   Sq Epi: x / Non Sq Epi: x / Bacteria: x      CAPILLARY BLOOD GLUCOSE            RADIOLOGY & ADDITIONAL TESTS:    Imaging Personally Reviewed:  YES/NO    Consultant(s) Notes Reviewed:   YES/ No    Care Discussed with Consultants :     Plan of care was discussed with patient and /or primary care giver; all questions and concerns were addressed and care was aligned with patient's wishes.     NP Note discussed with  Primary Attending    Patient is a 83y old  Male who presents with a chief complaint of frequent falls, weakness (11 Jan 2024 11:50)      INTERVAL HPI/OVERNIGHT EVENTS:  83 y.o M w/ PMHx of BPH w/ urinary retention, stroke (2013), emphysema, afib not on ac, dementia, erectile dysfunction, vertigo, leukocytosis of unknown origin (currently being worked up at [facility] Dr. 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 Wife states that he has vertigo and balance issues, but he has also been very weak, especially in the past three months. Pt endorses losing 60 pounds in the past 1.5 years, and wife endorses that he has had decreased appetite for the past 3 months. Pt also has intermittent night sweats. Labs significant for elevated WBC. CTH shows no changes from CTH in 11/2023. Xray show no fractures. CXR shows scattered non-specific pulmonary infiltrates. Admitted for ambulatory dysfunction and malignancy workup    Surgery was consulted -- Given inpatient w/u and history of declining mental status w/increasing frailty in setting of likely multifocal malignancy vs metastatic burden, excisional biopsy of RLE mass unlikely to provide any clinical benefit and has subsequently been cancelled. Recommended MRI RLE for possible percutaneous core needle bx by IR, however pt was unable to tolerate MRI with sedation. Sx onc sign off    Hem/onc following -- ·Concern for sarcoma. Patient may also have a prostate primary given elevated PSA (though < 10) with recent MRI of the prostate PIRADS-5. Patient is not a surgical candidate for excision. Urology consulted for BPH/elevated PSA with no acute urologic intervention as inpatient. Cardiology also following with no further inpatient cardiac work up needed. Palliative consulted -- F/U recs. Wife leaning to LTC placement    PT recs ABRAHAM    MEDICATIONS  (STANDING):  aspirin  chewable 81 milliGRAM(s) Oral daily  chlorhexidine 2% Cloths 1 Application(s) Topical <User Schedule>  donepezil 5 milliGRAM(s) Oral at bedtime  enoxaparin Injectable 40 milliGRAM(s) SubCutaneous every 24 hours  melatonin 3 milliGRAM(s) Oral at bedtime  mupirocin 2% Ointment 1 Application(s) Both Nostrils two times a day  tamsulosin 0.4 milliGRAM(s) Oral at bedtime    MEDICATIONS  (PRN):      __________________________________________________  REVIEW OF SYSTEMS:    Unable to assess due to poor mental status  ALL OTHER  ROS negative        Vital Signs Last 24 Hrs  T(C): 36.5 (11 Jan 2024 12:16), Max: 36.6 (10 Rich 2024 14:39)  T(F): 97.7 (11 Jan 2024 12:16), Max: 97.9 (10 Rich 2024 14:39)  HR: 87 (11 Jan 2024 12:16) (76 - 100)  BP: 111/53 (11 Jan 2024 12:16) (95/50 - 111/53)  BP(mean): 72 (11 Jan 2024 12:16) (72 - 72)  RR: 19 (11 Jan 2024 12:16) (19 - 20)  SpO2: 96% (11 Jan 2024 12:16) (92% - 97%)    Parameters below as of 11 Jan 2024 12:16  Patient On (Oxygen Delivery Method): nasal cannula  O2 Flow (L/min): 2      ________________________________________________  PHYSICAL EXAM:  GENERAL: NAD, frail  HEENT: Normocephalic;  conjunctivae and sclerae clear; moist mucous membranes;   NECK : supple  CHEST/LUNG: Diminished  HEART: S1 S2  regular;  ABDOMEN: Soft, Nontender, Nondistended; Bowel sounds present  EXTREMITIES: no cyanosis; no edema; no calf tenderness  SKIN: warm and dry; no rash  NERVOUS SYSTEM:  Lethargic, arousable    _________________________________________________  LABS:                        8.5    34.93 )-----------( 323      ( 11 Jan 2024 06:08 )             27.7     01-11    144  |  111<H>  |  29<H>  ----------------------------<  94  4.0   |  29  |  0.91    Ca    11.1<H>      11 Jan 2024 06:08  Phos  3.1     01-11  Mg     2.2     01-11    TPro  5.5<L>  /  Alb  1.2<L>  /  TBili  0.8  /  DBili  x   /  AST  29  /  ALT  19  /  AlkPhos  144<H>  01-11      Urinalysis Basic - ( 11 Jan 2024 06:08 )    Color: x / Appearance: x / SG: x / pH: x  Gluc: 94 mg/dL / Ketone: x  / Bili: x / Urobili: x   Blood: x / Protein: x / Nitrite: x   Leuk Esterase: x / RBC: x / WBC x   Sq Epi: x / Non Sq Epi: x / Bacteria: x      CAPILLARY BLOOD GLUCOSE            RADIOLOGY & ADDITIONAL TESTS:  < from: CT Abdomen and Pelvis w/ IV Cont (01.07.24 @ 13:41) >    ACC: [id number] EXAM:  CT ABDOMEN AND PELVIS IC   ORDERED BY: JACKI JARQUIN     ACC: [id number] EXAM:  CT CHEST IC   ORDERED BY: JACKI JARQUIN     PROCEDURE DATE:  01/07/2024          INTERPRETATION:  CLINICAL INFORMATION: Right leg malignancy.    COMPARISON: Outside contrast-enhanced CT of the thorax, abdomen, and   pelvis November 6, 2023.    CONTRAST/COMPLICATIONS:  IV Contrast: Omnipaque 350 (accession [id number]), IV contrast documented   in unlinked concurrent exam (accession [id number] cc administered   10   cc discarded  Oral Contrast: NONE  Complications: None reported at time of study completion    PROCEDURE:  CT of the Chest, Abdomen and Pelvis was performed.  Sagittal and coronal reformats were performed.    FINDINGS:  CHEST:  LUNGS AND LARGE AIRWAYS: Patent central airways. There is been interval   increase in size and number of numerous centrally necrotic lung masses   measuring up to 3.7 cm in the left lower lobe compatible with metastatic   disease. Mild right lowerlobe dependent atelectasis.  PLEURA: Trace bilateral layering pleural effusions, right greater than   left.  VESSELS: Within normal limits.  HEART: Heart size is normal. No pericardial effusion.  MEDIASTINUM AND REMY: No lymphadenopathy.  CHEST WALL AND LOWER NECK: Within normal limits.    ABDOMEN AND PELVIS:  LIVER: Within normal limits.  BILE DUCTS: Normal caliber.  GALLBLADDER: Within normal limits.  SPLEEN: Within normal limits.  PANCREAS: Within normal limits.  ADRENALS: Within normal limits.  KIDNEYS/URETERS: Left renal cysts. Otherwise, within normal limits.    BLADDER: Within normal limits.  REPRODUCTIVE ORGANS: The prostate is not enlarged.    BOWEL: Colonic diverticulosis. No bowel obstruction. Appendix is normal.   There is no mesenteric adenopathy.  PERITONEUM: No ascites.  VESSELS: Within normal limits.  RETROPERITONEUM/LYMPH NODES: No lymphadenopathy.  ABDOMINAL WALL: Small fat-containing left inguinal hernia.  BONES: Mild anterolisthesis of L4 on L5. Degenerative disc disease at   L5-S1.. There is been interval increase in size of an incompletely   visualized peripherally enhancing mass in the right medial thigh.    IMPRESSION: Extensive progressive metastatic burden to the lungs.    --- End of Report ---            ARLIN XIAO MD; Attending Radiologist  This document has been electronically signed. Jan 7 2024  5:10PM    < end of copied text >    Imaging Personally Reviewed:  YES/NO    Consultant(s) Notes Reviewed:   YES/ No    Care Discussed with Consultants :     Plan of care was discussed with patient and /or primary care giver; all questions and concerns were addressed and care was aligned with patient's wishes.     NP Note discussed with  Primary Attending    Patient is a 83y old  Male who presents with a chief complaint of frequent falls, weakness (11 Jan 2024 11:50)      INTERVAL HPI/OVERNIGHT EVENTS:  83 y.o M w/ PMHx of BPH w/ urinary retention, stroke (2013), emphysema, afib not on ac, dementia, erectile dysfunction, vertigo, leukocytosis of unknown origin (currently being worked up at [facility] Dr. 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 Wife states that he has vertigo and balance issues, but he has also been very weak, especially in the past three months. Pt endorses losing 60 pounds in the past 1.5 years, and wife endorses that he has had decreased appetite for the past 3 months. Pt also has intermittent night sweats. Labs significant for elevated WBC. CTH shows no changes from CTH in 11/2023. Xray show no fractures. CXR shows scattered non-specific pulmonary infiltrates. Admitted for ambulatory dysfunction and malignancy workup    Surgery was consulted -- Given inpatient w/u and history of declining mental status w/increasing frailty in setting of likely multifocal malignancy vs metastatic burden, excisional biopsy of RLE mass unlikely to provide any clinical benefit and has subsequently been cancelled. Recommended MRI RLE for possible percutaneous core needle bx by IR, however pt was unable to tolerate MRI with sedation. Sx onc sign off    Hem/onc following -- ·Concern for sarcoma. Patient may also have a prostate primary given elevated PSA (though < 10) with recent MRI of the prostate PIRADS-5. Patient is not a surgical candidate for excision. Urology consulted for BPH/elevated PSA with no acute urologic intervention as inpatient. Cardiology also following with no further inpatient cardiac work up needed. Palliative consulted -- F/U recs. Wife leaning to LTC placement    PT recs ABRAHAM    MEDICATIONS  (STANDING):  aspirin  chewable 81 milliGRAM(s) Oral daily  chlorhexidine 2% Cloths 1 Application(s) Topical <User Schedule>  donepezil 5 milliGRAM(s) Oral at bedtime  enoxaparin Injectable 40 milliGRAM(s) SubCutaneous every 24 hours  melatonin 3 milliGRAM(s) Oral at bedtime  mupirocin 2% Ointment 1 Application(s) Both Nostrils two times a day  tamsulosin 0.4 milliGRAM(s) Oral at bedtime    MEDICATIONS  (PRN):      __________________________________________________  REVIEW OF SYSTEMS:    Unable to assess due to poor mental status  ALL OTHER  ROS negative        Vital Signs Last 24 Hrs  T(C): 36.5 (11 Jan 2024 12:16), Max: 36.6 (10 Rich 2024 14:39)  T(F): 97.7 (11 Jan 2024 12:16), Max: 97.9 (10 Rich 2024 14:39)  HR: 87 (11 Jan 2024 12:16) (76 - 100)  BP: 111/53 (11 Jan 2024 12:16) (95/50 - 111/53)  BP(mean): 72 (11 Jan 2024 12:16) (72 - 72)  RR: 19 (11 Jan 2024 12:16) (19 - 20)  SpO2: 96% (11 Jan 2024 12:16) (92% - 97%)    Parameters below as of 11 Jan 2024 12:16  Patient On (Oxygen Delivery Method): nasal cannula  O2 Flow (L/min): 2      ________________________________________________  PHYSICAL EXAM:  GENERAL: NAD, frail  HEENT: Normocephalic;  conjunctivae and sclerae clear; moist mucous membranes;   NECK : supple  CHEST/LUNG: Diminished  HEART: S1 S2  regular;  ABDOMEN: Soft, Nontender, Nondistended; Bowel sounds present  EXTREMITIES: no cyanosis; no edema; no calf tenderness  SKIN: warm and dry; no rash  NERVOUS SYSTEM:  Lethargic, arousable    _________________________________________________  LABS:                        8.5    34.93 )-----------( 323      ( 11 Jan 2024 06:08 )             27.7     01-11    144  |  111<H>  |  29<H>  ----------------------------<  94  4.0   |  29  |  0.91    Ca    11.1<H>      11 Jan 2024 06:08  Phos  3.1     01-11  Mg     2.2     01-11    TPro  5.5<L>  /  Alb  1.2<L>  /  TBili  0.8  /  DBili  x   /  AST  29  /  ALT  19  /  AlkPhos  144<H>  01-11      Urinalysis Basic - ( 11 Jan 2024 06:08 )    Color: x / Appearance: x / SG: x / pH: x  Gluc: 94 mg/dL / Ketone: x  / Bili: x / Urobili: x   Blood: x / Protein: x / Nitrite: x   Leuk Esterase: x / RBC: x / WBC x   Sq Epi: x / Non Sq Epi: x / Bacteria: x      CAPILLARY BLOOD GLUCOSE            RADIOLOGY & ADDITIONAL TESTS:  < from: CT Abdomen and Pelvis w/ IV Cont (01.07.24 @ 13:41) >    ACC: [id number] EXAM:  CT ABDOMEN AND PELVIS IC   ORDERED BY: JACKI JARQUIN     ACC: [id number] EXAM:  CT CHEST IC   ORDERED BY: JACKI JARQUIN     PROCEDURE DATE:  01/07/2024          INTERPRETATION:  CLINICAL INFORMATION: Right leg malignancy.    COMPARISON: Outside contrast-enhanced CT of the thorax, abdomen, and   pelvis November 6, 2023.    CONTRAST/COMPLICATIONS:  IV Contrast: Omnipaque 350 (accession [id number]), IV contrast documented   in unlinked concurrent exam (accession [id number] cc administered   10   cc discarded  Oral Contrast: NONE  Complications: None reported at time of study completion    PROCEDURE:  CT of the Chest, Abdomen and Pelvis was performed.  Sagittal and coronal reformats were performed.    FINDINGS:  CHEST:  LUNGS AND LARGE AIRWAYS: Patent central airways. There is been interval   increase in size and number of numerous centrally necrotic lung masses   measuring up to 3.7 cm in the left lower lobe compatible with metastatic   disease. Mild right lowerlobe dependent atelectasis.  PLEURA: Trace bilateral layering pleural effusions, right greater than   left.  VESSELS: Within normal limits.  HEART: Heart size is normal. No pericardial effusion.  MEDIASTINUM AND REMY: No lymphadenopathy.  CHEST WALL AND LOWER NECK: Within normal limits.    ABDOMEN AND PELVIS:  LIVER: Within normal limits.  BILE DUCTS: Normal caliber.  GALLBLADDER: Within normal limits.  SPLEEN: Within normal limits.  PANCREAS: Within normal limits.  ADRENALS: Within normal limits.  KIDNEYS/URETERS: Left renal cysts. Otherwise, within normal limits.    BLADDER: Within normal limits.  REPRODUCTIVE ORGANS: The prostate is not enlarged.    BOWEL: Colonic diverticulosis. No bowel obstruction. Appendix is normal.   There is no mesenteric adenopathy.  PERITONEUM: No ascites.  VESSELS: Within normal limits.  RETROPERITONEUM/LYMPH NODES: No lymphadenopathy.  ABDOMINAL WALL: Small fat-containing left inguinal hernia.  BONES: Mild anterolisthesis of L4 on L5. Degenerative disc disease at   L5-S1.. There is been interval increase in size of an incompletely   visualized peripherally enhancing mass in the right medial thigh.    IMPRESSION: Extensive progressive metastatic burden to the lungs.    --- End of Report ---            ARLIN XIAO MD; Attending Radiologist  This document has been electronically signed. Jan 7 2024  5:10PM    < end of copied text >    Imaging Personally Reviewed:  YES/NO    Consultant(s) Notes Reviewed:   YES/ No    Care Discussed with Consultants :     Plan of care was discussed with patient and /or primary care giver; all questions and concerns were addressed and care was aligned with patient's wishes.

## 2024-01-11 NOTE — PROGRESS NOTE ADULT - ASSESSMENT
NIKHIL CASTRO is a 83y Male who presents with a chief complaint of falls    Metastatic Malignancy  ·	Patient with known right upper thigh mass. Imaging also showed metastatic burden in the lungs.  ·	Concern for sarcoma. Patient may also have a prostate primary given elevated PSA (though < 10) with recent MRI of the prostate PIRADS-5.   ·	Patient is not a surgical candidate for excision.  ·	Interventional radiology consultation for biopsy. Will follow-up on pathology.  ·	MRI was unable to be done 1/9, consider biopsy of one of the lung met.   ·	Urology recommendations noted; no plans for inpatient workup or procedures at this time.  ·	Pallliative care on board.  GOC discussions ongoing.     Leukocytosis  ·	Bone marrow biopsy in November 2023 was unremarkable with no evidence of hematological malignancy.  ·	Likely reactive to ongoing malignancy or inflammation.  ·	Rule out infections.  ·	Continue to monitor and trend.    Anemia  ·	In the setting of malignancy, inflammation. Ferritin very elevated.  ·	overall stable  ·	No need for further workup or intervention at this time.   ·	Will follow-up on remaining labs.   ·	Monitor and maintain above 7.    Will continue to follow.

## 2024-01-11 NOTE — PROGRESS NOTE ADULT - PROBLEM SELECTOR PLAN 2
S/P mechanical fall outpt  - PT recs ABRAHAM S/P mechanical fall outpt  - PT recs ABRAHAM. Pt more likely LTC candidate

## 2024-01-11 NOTE — PROGRESS NOTE ADULT - SUBJECTIVE AND OBJECTIVE BOX
DATE OF SERVICE: 01-11-24    Remains somnelent    aspirin  chewable 81 milliGRAM(s) Oral daily  chlorhexidine 2% Cloths 1 Application(s) Topical <User Schedule>  donepezil 5 milliGRAM(s) Oral at bedtime  enoxaparin Injectable 40 milliGRAM(s) SubCutaneous every 24 hours  melatonin 3 milliGRAM(s) Oral at bedtime  mupirocin 2% Ointment 1 Application(s) Both Nostrils two times a day  tamsulosin 0.4 milliGRAM(s) Oral at bedtime                            8.5    34.93 )-----------( 323      ( 11 Jan 2024 06:08 )             27.7       Hemoglobin: 8.5 g/dL (01-11 @ 06:08)  Hemoglobin: 8.1 g/dL (01-10 @ 07:06)  Hemoglobin: 8.9 g/dL (01-09 @ 10:08)  Hemoglobin: 8.4 g/dL (01-09 @ 07:02)  Hemoglobin: 9.3 g/dL (01-08 @ 07:46)      01-11    144  |  111<H>  |  29<H>  ----------------------------<  94  4.0   |  29  |  0.91    Ca    11.1<H>      11 Jan 2024 06:08  Phos  3.1     01-11  Mg     2.2     01-11    TPro  5.5<L>  /  Alb  1.2<L>  /  TBili  0.8  /  DBili  x   /  AST  29  /  ALT  19  /  AlkPhos  144<H>  01-11    Creatinine Trend: 0.91<--, 0.82<--, 0.90<--, 0.99<--, 0.81<--, 0.79<--    COAGS:           T(C): 36.2 (01-11-24 @ 05:00), Max: 36.6 (01-10-24 @ 14:39)  HR: 100 (01-11-24 @ 05:00) (76 - 100)  BP: 110/72 (01-11-24 @ 05:00) (95/50 - 110/72)  RR: 19 (01-11-24 @ 05:00) (19 - 20)  SpO2: 94% (01-11-24 @ 05:00) (92% - 97%)  Wt(kg): --    I&O's Summary      HEENT:  (-)icterus (-)pallor  CV: N S1 S2 1/6 DANNY (+)2 Pulses B/l  Resp:  Clear to ausculatation B/L, normal effort  GI: (+) BS Soft, NT, ND  Lymph:  (-)Edema, (-)obvious lymphadenopathy  Skin: Warm to touch, Normal turgor  Psych: Unable to adequately assess mood and affect    ASSESSMENT/PLAN: 	83y Male  PMHx of BPH w/ urinary retention, stroke (2013), HTN, DM, afib not on ac, dementia, erectile dysfunction, vertigo, leukocytosis of unknown origin (currently being worked up at [facility] Dr. 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    # Abnormal EKG  - Appear to have wondering atrial pacemaker  - tachy overnight, no complaints of palpitation   - ECHO noted , normal LV fx  - No need for further inpatient cardiac work up.    # Fall  - No evidence of LOC    # Leukocytosis  - Heme/onc f/u  - Surgery f/u noted    Fredi Wilson MD, [facility]  BEEPER [phone]     DATE OF SERVICE: 01-11-24    Remains somnelent    aspirin  chewable 81 milliGRAM(s) Oral daily  chlorhexidine 2% Cloths 1 Application(s) Topical <User Schedule>  donepezil 5 milliGRAM(s) Oral at bedtime  enoxaparin Injectable 40 milliGRAM(s) SubCutaneous every 24 hours  melatonin 3 milliGRAM(s) Oral at bedtime  mupirocin 2% Ointment 1 Application(s) Both Nostrils two times a day  tamsulosin 0.4 milliGRAM(s) Oral at bedtime                            8.5    34.93 )-----------( 323      ( 11 Jan 2024 06:08 )             27.7       Hemoglobin: 8.5 g/dL (01-11 @ 06:08)  Hemoglobin: 8.1 g/dL (01-10 @ 07:06)  Hemoglobin: 8.9 g/dL (01-09 @ 10:08)  Hemoglobin: 8.4 g/dL (01-09 @ 07:02)  Hemoglobin: 9.3 g/dL (01-08 @ 07:46)      01-11    144  |  111<H>  |  29<H>  ----------------------------<  94  4.0   |  29  |  0.91    Ca    11.1<H>      11 Jan 2024 06:08  Phos  3.1     01-11  Mg     2.2     01-11    TPro  5.5<L>  /  Alb  1.2<L>  /  TBili  0.8  /  DBili  x   /  AST  29  /  ALT  19  /  AlkPhos  144<H>  01-11    Creatinine Trend: 0.91<--, 0.82<--, 0.90<--, 0.99<--, 0.81<--, 0.79<--    COAGS:           T(C): 36.2 (01-11-24 @ 05:00), Max: 36.6 (01-10-24 @ 14:39)  HR: 100 (01-11-24 @ 05:00) (76 - 100)  BP: 110/72 (01-11-24 @ 05:00) (95/50 - 110/72)  RR: 19 (01-11-24 @ 05:00) (19 - 20)  SpO2: 94% (01-11-24 @ 05:00) (92% - 97%)  Wt(kg): --    I&O's Summary      HEENT:  (-)icterus (-)pallor  CV: N S1 S2 1/6 DANNY (+)2 Pulses B/l  Resp:  Clear to ausculatation B/L, normal effort  GI: (+) BS Soft, NT, ND  Lymph:  (-)Edema, (-)obvious lymphadenopathy  Skin: Warm to touch, Normal turgor  Psych: Unable to adequately assess mood and affect    ASSESSMENT/PLAN: 	83y Male  PMHx of BPH w/ urinary retention, stroke (2013), HTN, DM, afib not on ac, dementia, erectile dysfunction, vertigo, leukocytosis of unknown origin (currently being worked up at [facility] Dr. 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    # Abnormal EKG  - Appear to have wondering atrial pacemaker  - tachy overnight, no complaints of palpitation   - ECHO noted , normal LV fx  - No need for further inpatient cardiac work up.    # Fall  - No evidence of LOC    # Leukocytosis  - Heme/onc f/u  - Surgery f/u noted    Fredi Wilson MD, [facility]  BEEPER [phone]

## 2024-01-11 NOTE — PROGRESS NOTE ADULT - PROBLEM SELECTOR PLAN 1
Leukocytosis with negative outpatient leukemia workup  - CT Abd & Pelvis- right thigh mass  - CT Chest extensive progressive Metastatic burden to the lungs  - Sx/Onc consulted: not a candidate for excisional biopsy  - IR consulted for Core biopsy of thigh recs MRI first but pt unable to tolerate MRI today despite Ativan prior to test  - Palliative consulted -- F/U recs  - hem/onc following Leukocytosis with negative outpatient leukemia workup  - CT Abd & Pelvis- right thigh mass  - CT Chest extensive progressive Metastatic burden to the lungs  - Sx/Onc consulted: not a candidate for excisional biopsy  - IR consulted for Core biopsy of thigh recs MRI first but pt unable to tolerate MRI today despite Ativan prior to test  - Palliative consulted -- F/U recs. Wife leaning towards LTC placement  - hem/onc following

## 2024-01-11 NOTE — PROGRESS NOTE ADULT - NS ATTEND AMEND GEN_ALL_CORE FT
Patient seen at bedside, somnolent, opens eyes to verbal stimuli but does not speak.  On exam, patient is AOx0, somnolent, grimaces on painful stimuli, cardiopulmonary exams unremarkable, abdomen soft, NT/ND, Has bilateral knee tenderness. Has R knee subcutaneous mass.  Labs reviewed, CBC with inproving WBC, BMP with normal Cr, Ca 11.1 unchanged.    Assessment and plan:  83 y.o M w/ PMHx of BPH w/ urinary retention, stroke (2013), emphysema, afib not on AC, dementia, erectile dysfunction, vertigo, leukocytosis of unknown origin (currently being worked up at [facility] Dr. 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 Labs significant for elevated WBC. CTH shows no changes from CTH in 11/2023. Xray show no fractures. CXR shows scattered non-specific pulmonary infiltrates. Admitted for ambulatory dysfunction and malignancy workup. Further workup shows multiple nodules in the lungs suggestive of metastases. Oncology consulted, recommending biopsy for further guidance of treatment, but patient is not considered a candidate for conventional chemotherapy given the age, low functional status and comorbidities. Surgery recommends needle core biopsy as the risk of surgical biopsy is deemed to overweigh the benefit. Attempted MRI but unable to obtain the imaging even with ativan as patient became restless.    # 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    # Lung nodules suspicious of malignancy   # Enlarged prostate, concerning for malignancy   # Leukocytosis, likely due to malignancy, ruled out leukemia with BM biopsy    # Anemia   # Delirium, possible hospital onset delirium; unlikely infection, CT head neg, electrolytes stable- resolved   # Hypercalcemia of malignancy   # Renal complex mass   # Abnormal EKG, appearing  2/2 wondering atrial pacemaker   # Atrial fibrillation not on AC   # hx of CVA, cerebellar infarct    # Emphysema    # Severe protein calorie malnutrition     - heme/onc recs appreciated   - patient was not able to tolerate MRI of R thigh for further evaluation  - GOC discussion held with primary team, palliative care Dr. Rodas, and patient's wife and HCP Sadie 1/10, awaiting final decision from the HCP  - hypercalcemia stable   - DVT ppx: Lovenox. Patient seen at bedside, somnolent, opens eyes to verbal stimuli but does not speak.  On exam, patient is AOx0, somnolent, grimaces on painful stimuli, cardiopulmonary exams unremarkable, abdomen soft, NT/ND, Has bilateral knee tenderness. Has R knee subcutaneous mass.  Labs reviewed, CBC with inproving WBC, BMP with normal Cr, Ca 11.1 unchanged.    Assessment and plan:  83 y.o M w/ PMHx of BPH w/ urinary retention, stroke (2013), emphysema, afib not on AC, dementia, erectile dysfunction, vertigo, leukocytosis of unknown origin (currently being worked up at [facility] Dr. 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 Labs significant for elevated WBC. CTH shows no changes from CTH in 11/2023. Xray show no fractures. CXR shows scattered non-specific pulmonary infiltrates. Admitted for ambulatory dysfunction and malignancy workup. Further workup shows multiple nodules in the lungs suggestive of metastases. Oncology consulted, recommending biopsy for further guidance of treatment, but patient is not considered a candidate for conventional chemotherapy given the age, low functional status and comorbidities. Surgery recommends needle core biopsy as the risk of surgical biopsy is deemed to overweigh the benefit. Attempted MRI but unable to obtain the imaging even with ativan as patient became restless.    # 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    # Lung nodules suspicious of malignancy   # Enlarged prostate, concerning for malignancy   # Leukocytosis, likely due to malignancy, ruled out leukemia with BM biopsy    # Anemia   # Delirium, possible hospital onset delirium; unlikely infection, CT head neg, electrolytes stable- resolved   # Hypercalcemia of malignancy   # Renal complex mass   # Abnormal EKG, appearing  2/2 wondering atrial pacemaker   # Atrial fibrillation not on AC   # hx of CVA, cerebellar infarct    # Emphysema    # Severe protein calorie malnutrition     - heme/onc recs appreciated   - patient was not able to tolerate MRI of R thigh for further evaluation  - GOC discussion held with primary team, palliative care Dr. Rodas, and patient's wife and HCP Sadie 1/10, awaiting final decision from the HCP  - hypercalcemia stable   - DVT ppx: Lovenox.

## 2024-01-11 NOTE — PROGRESS NOTE ADULT - ASSESSMENT
83 y.o M w/ PMHx of BPH w/ urinary retention, stroke (2013), emphysema, afib not on ac, dementia, erectile dysfunction, vertigo, leukocytosis of unknown origin (currently being worked up at [facility] Dr. 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 Admitted for ambulatory dysfunction and malignancy workup 83 y.o M w/ PMHx of BPH w/ urinary retention, stroke (2013), emphysema, afib not on ac, dementia, erectile dysfunction, vertigo, leukocytosis of unknown origin (currently being worked up at [facility] Dr. 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 Admitted for ambulatory dysfunction and malignancy workup

## 2024-01-11 NOTE — PROGRESS NOTE ADULT - PROBLEM SELECTOR PLAN 3
WBC 47.92 on admission uptrended to 57  - WBC 45 today  - hem/onc following -- Bone marrow biopsy in November 2023 was unremarkable with no evidence of hematological malignancy. Likely reactive to ongoing malignancy or inflammation. WBC 47.92 on admission uptrended to 57  - WBC 34 today  - hem/onc following -- Bone marrow biopsy in November 2023 was unremarkable with no evidence of hematological malignancy. Likely reactive to ongoing malignancy or inflammation.

## 2024-01-12 NOTE — PROGRESS NOTE ADULT - PROBLEM SELECTOR PLAN 7
As above.  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  ECOG 4  with PPS for 30%.  He is a poor candidate for any cancer directed treatment (systemic or otherwise).  Hospice appropriate with prognosis of weeks to  months.  Now low threshold for inpatient hospice/IPOU with worsening pain and agitation over last 48 hours    See GOC discussions from 1/10 and above--wife does not want to pursue further biopsy or work-up, verbally in agreement with hospice in LTC/facility setting, exploring options. Remains too emotionally overwhelmed to  make additional decisions about LST or transitioning to comfort care, patient to remain Full Code at this time.    Remainder of management as per primary medical team  Will start IV lorazepam PRN agitation  Ongoing collaboration with Pall Care team SW to explore LTC hospice options   Wife encouraged to have children involved in additional GOC discussions  Discussed with primary team and Dr. Soriano in detail  Palliative Care team will continue to follow. As above.  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  ECOG 4  with PPS for 30%.  He is a poor candidate for any cancer directed treatment (systemic or otherwise).  Hospice appropriate with prognosis of weeks to  months.  Now low threshold for inpatient hospice/IPOU with worsening pain and agitation over last 48 hours    See GOC discussions from 1/10 and above--wife does not want to pursue further biopsy or work-up, verbally in agreement with hospice in LTC/facility setting, exploring options. Remains too emotionally overwhelmed to  make additional decisions about LST or transitioning to comfort care, patient to remain Full Code at this time.    Remainder of management as per primary medical team  Will start IV lorazepam PRN agitation  Ongoing collaboration with Pall Care team SW to explore LTC hospice options   Wife encouraged to have children involved in additional GOC discussions  Discussed with primary team and Dr. Soriano in detail  Palliative Care team will continue to follow.

## 2024-01-12 NOTE — PROGRESS NOTE ADULT - SUBJECTIVE AND OBJECTIVE BOX
NP Note discussed with  Primary Attending    Patient is a 83y old  Male who presents with a chief complaint of frequent falls, weakness (12 Jan 2024 11:24)      INTERVAL HPI/OVERNIGHT EVENTS:  83 y.o M w/ PMHx of BPH w/ urinary retention, stroke (2013), emphysema, afib not on ac, dementia, erectile dysfunction, vertigo, leukocytosis of unknown origin (currently being worked up at [facility] Dr. 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 Wife states that he has vertigo and balance issues, but he has also been very weak, especially in the past three months. Pt endorses losing 60 pounds in the past 1.5 years, and wife endorses that he has had decreased appetite for the past 3 months. Pt also has intermittent night sweats. Labs significant for elevated WBC. CTH shows no changes from CTH in 11/2023. Xray show no fractures. CXR shows scattered non-specific pulmonary infiltrates. Admitted for ambulatory dysfunction and malignancy workup    Surgery was consulted -- Given inpatient w/u and history of declining mental status w/increasing frailty in setting of likely multifocal malignancy vs metastatic burden, excisional biopsy of RLE mass unlikely to provide any clinical benefit and has subsequently been cancelled. Recommended MRI RLE for possible percutaneous core needle bx by IR, however pt was unable to tolerate MRI with sedation. Sx onc sign off    Hem/onc following -- ·Concern for sarcoma. Patient may also have a prostate primary given elevated PSA (though < 10) with recent MRI of the prostate PIRADS-5. Patient is not a surgical candidate for excision. Urology consulted for BPH/elevated PSA with no acute urologic intervention as inpatient. Cardiology also following with no further inpatient cardiac work up needed. Palliative consulted -- F/U recs. Wife leaning to LTC placement    PT recs ABRAHAM      MEDICATIONS  (STANDING):  aspirin  chewable 81 milliGRAM(s) Oral daily  chlorhexidine 2% Cloths 1 Application(s) Topical <User Schedule>  donepezil 5 milliGRAM(s) Oral at bedtime  enoxaparin Injectable 40 milliGRAM(s) SubCutaneous every 24 hours  melatonin 3 milliGRAM(s) Oral at bedtime  mupirocin 2% Ointment 1 Application(s) Both Nostrils two times a day  tamsulosin 0.4 milliGRAM(s) Oral at bedtime    MEDICATIONS  (PRN):      __________________________________________________  REVIEW OF SYSTEMS:    CONSTITUTIONAL: No fever,   EYES: no acute visual disturbances  NECK: No pain or stiffness  RESPIRATORY: No cough; No shortness of breath  CARDIOVASCULAR: No chest pain, no palpitations  GASTROINTESTINAL: No pain. No nausea or vomiting; No diarrhea   NEUROLOGICAL: No headache or numbness, no tremors  MUSCULOSKELETAL: No joint pain, no muscle pain  GENITOURINARY: no dysuria, no frequency, no hesitancy  PSYCHIATRY: no depression , no anxiety  ALL OTHER  ROS negative        Vital Signs Last 24 Hrs  T(C): 36.3 (12 Jan 2024 05:00), Max: 36.5 (11 Jan 2024 20:38)  T(F): 97.3 (12 Jan 2024 05:00), Max: 97.7 (11 Jan 2024 20:38)  HR: 73 (12 Jan 2024 05:00) (73 - 89)  BP: 111/49 (12 Jan 2024 05:00) (107/80 - 111/49)  BP(mean): 86 (11 Jan 2024 20:38) (86 - 86)  RR: 19 (12 Jan 2024 05:00) (18 - 19)  SpO2: 98% (12 Jan 2024 05:00) (94% - 98%)    Parameters below as of 12 Jan 2024 05:00  Patient On (Oxygen Delivery Method): nasal cannula  O2 Flow (L/min): 2      ________________________________________________  PHYSICAL EXAM:  GENERAL: NAD  HEENT: Normocephalic;  conjunctivae and sclerae clear; moist mucous membranes;   NECK : supple  CHEST/LUNG: Clear to auscultation bilaterally with good air entry   HEART: S1 S2  regular; no murmurs, gallops or rubs  ABDOMEN: Soft, Nontender, Nondistended; Bowel sounds present  EXTREMITIES: no cyanosis; no edema; no calf tenderness  SKIN: warm and dry; no rash  NERVOUS SYSTEM:  Awake and alert; Oriented  to place, person and time ; no new deficits    _________________________________________________  LABS:                        8.5    34.93 )-----------( 323      ( 11 Jan 2024 06:08 )             27.7     01-11    144  |  111<H>  |  29<H>  ----------------------------<  94  4.0   |  29  |  0.91    Ca    11.1<H>      11 Jan 2024 06:08  Phos  3.1     01-11  Mg     2.2     01-11    TPro  5.5<L>  /  Alb  1.2<L>  /  TBili  0.8  /  DBili  x   /  AST  29  /  ALT  19  /  AlkPhos  144<H>  01-11      Urinalysis Basic - ( 11 Jan 2024 06:08 )    Color: x / Appearance: x / SG: x / pH: x  Gluc: 94 mg/dL / Ketone: x  / Bili: x / Urobili: x   Blood: x / Protein: x / Nitrite: x   Leuk Esterase: x / RBC: x / WBC x   Sq Epi: x / Non Sq Epi: x / Bacteria: x      CAPILLARY BLOOD GLUCOSE            RADIOLOGY & ADDITIONAL TESTS:    Imaging Personally Reviewed:  YES/NO    Consultant(s) Notes Reviewed:   YES/ No    Care Discussed with Consultants :     Plan of care was discussed with patient and /or primary care giver; all questions and concerns were addressed and care was aligned with patient's wishes.     NP Note discussed with  Primary Attending    Patient is a 83y old  Male who presents with a chief complaint of frequent falls, weakness (12 Jan 2024 11:24)      INTERVAL HPI/OVERNIGHT EVENTS:  83 y.o M w/ PMHx of BPH w/ urinary retention, stroke (2013), emphysema, afib not on ac, dementia, erectile dysfunction, vertigo, leukocytosis of unknown origin (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 Wife states that he has vertigo and balance issues, but he has also been very weak, especially in the past three months. Pt endorses losing 60 pounds in the past 1.5 years, and wife endorses that he has had decreased appetite for the past 3 months. Pt also has intermittent night sweats. Labs significant for elevated WBC. CTH shows no changes from CTH in 11/2023. Xray show no fractures. CXR shows scattered non-specific pulmonary infiltrates. Admitted for ambulatory dysfunction and malignancy workup    Surgery was consulted -- Given inpatient w/u and history of declining mental status w/increasing frailty in setting of likely multifocal malignancy vs metastatic burden, excisional biopsy of RLE mass unlikely to provide any clinical benefit and has subsequently been cancelled. Recommended MRI RLE for possible percutaneous core needle bx by IR, however pt was unable to tolerate MRI with sedation. Sx onc sign off    Hem/onc following -- ·Concern for sarcoma. Patient may also have a prostate primary given elevated PSA (though < 10) with recent MRI of the prostate PIRADS-5. Patient is not a surgical candidate for excision. Urology consulted for BPH/elevated PSA with no acute urologic intervention as inpatient. Cardiology also following with no further inpatient cardiac work up needed. Palliative consulted -- F/U recs. Wife leaning to LTC placement    PT recs ABRAHAM      MEDICATIONS  (STANDING):  aspirin  chewable 81 milliGRAM(s) Oral daily  chlorhexidine 2% Cloths 1 Application(s) Topical <User Schedule>  donepezil 5 milliGRAM(s) Oral at bedtime  enoxaparin Injectable 40 milliGRAM(s) SubCutaneous every 24 hours  melatonin 3 milliGRAM(s) Oral at bedtime  mupirocin 2% Ointment 1 Application(s) Both Nostrils two times a day  tamsulosin 0.4 milliGRAM(s) Oral at bedtime    MEDICATIONS  (PRN):      __________________________________________________  REVIEW OF SYSTEMS:    CONSTITUTIONAL: No fever,   EYES: no acute visual disturbances  NECK: No pain or stiffness  RESPIRATORY: No cough; No shortness of breath  CARDIOVASCULAR: No chest pain, no palpitations  GASTROINTESTINAL: No pain. No nausea or vomiting; No diarrhea   NEUROLOGICAL: No headache or numbness, no tremors  MUSCULOSKELETAL: No joint pain, no muscle pain  GENITOURINARY: no dysuria, no frequency, no hesitancy  PSYCHIATRY: no depression , no anxiety  ALL OTHER  ROS negative        Vital Signs Last 24 Hrs  T(C): 36.3 (12 Jan 2024 05:00), Max: 36.5 (11 Jan 2024 20:38)  T(F): 97.3 (12 Jan 2024 05:00), Max: 97.7 (11 Jan 2024 20:38)  HR: 73 (12 Jan 2024 05:00) (73 - 89)  BP: 111/49 (12 Jan 2024 05:00) (107/80 - 111/49)  BP(mean): 86 (11 Jan 2024 20:38) (86 - 86)  RR: 19 (12 Jan 2024 05:00) (18 - 19)  SpO2: 98% (12 Jan 2024 05:00) (94% - 98%)    Parameters below as of 12 Jan 2024 05:00  Patient On (Oxygen Delivery Method): nasal cannula  O2 Flow (L/min): 2      ________________________________________________  PHYSICAL EXAM:  GENERAL: NAD  HEENT: Normocephalic;  conjunctivae and sclerae clear; moist mucous membranes;   NECK : supple  CHEST/LUNG: Clear to auscultation bilaterally with good air entry   HEART: S1 S2  regular; no murmurs, gallops or rubs  ABDOMEN: Soft, Nontender, Nondistended; Bowel sounds present  EXTREMITIES: no cyanosis; no edema; no calf tenderness  SKIN: warm and dry; no rash  NERVOUS SYSTEM:  Awake and alert; Oriented  to place, person and time ; no new deficits    _________________________________________________  LABS:                        8.5    34.93 )-----------( 323      ( 11 Jan 2024 06:08 )             27.7     01-11    144  |  111<H>  |  29<H>  ----------------------------<  94  4.0   |  29  |  0.91    Ca    11.1<H>      11 Jan 2024 06:08  Phos  3.1     01-11  Mg     2.2     01-11    TPro  5.5<L>  /  Alb  1.2<L>  /  TBili  0.8  /  DBili  x   /  AST  29  /  ALT  19  /  AlkPhos  144<H>  01-11      Urinalysis Basic - ( 11 Jan 2024 06:08 )    Color: x / Appearance: x / SG: x / pH: x  Gluc: 94 mg/dL / Ketone: x  / Bili: x / Urobili: x   Blood: x / Protein: x / Nitrite: x   Leuk Esterase: x / RBC: x / WBC x   Sq Epi: x / Non Sq Epi: x / Bacteria: x      CAPILLARY BLOOD GLUCOSE            RADIOLOGY & ADDITIONAL TESTS:    Imaging Personally Reviewed:  YES/NO    Consultant(s) Notes Reviewed:   YES/ No    Care Discussed with Consultants :     Plan of care was discussed with patient and /or primary care giver; all questions and concerns were addressed and care was aligned with patient's wishes.     NP Note discussed with  Primary Attending    Patient is a 83y old  Male who presents with a chief complaint of frequent falls, weakness (12 Jan 2024 11:24)      INTERVAL HPI/OVERNIGHT EVENTS:  83 y.o M w/ PMHx of BPH w/ urinary retention, stroke (2013), emphysema, afib not on ac, dementia, erectile dysfunction, vertigo, leukocytosis of unknown origin (currently being worked up at [facility] Dr. 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 Wife states that he has vertigo and balance issues, but he has also been very weak, especially in the past three months. Pt endorses losing 60 pounds in the past 1.5 years, and wife endorses that he has had decreased appetite for the past 3 months. Pt also has intermittent night sweats. Labs significant for elevated WBC. CTH shows no changes from CTH in 11/2023. Xray show no fractures. CXR shows scattered non-specific pulmonary infiltrates. Admitted for ambulatory dysfunction and malignancy workup    Surgery was consulted -- Given inpatient w/u and history of declining mental status w/increasing frailty in setting of likely multifocal malignancy vs metastatic burden, excisional biopsy of RLE mass unlikely to provide any clinical benefit and has subsequently been cancelled. Recommended MRI RLE for possible percutaneous core needle bx by IR, however pt was unable to tolerate MRI with sedation. Sx onc sign off    Hem/onc following -- ·Concern for sarcoma. Patient may also have a prostate primary given elevated PSA (though < 10) with recent MRI of the prostate PIRADS-5. Patient is not a surgical candidate for excision. Urology consulted for BPH/elevated PSA with no acute urologic intervention as inpatient. Cardiology also following with no further inpatient cardiac work up needed. Palliative consulted -- F/U recs. Wife leaning to LTC placement with hospice    PT recs ABRAHAM      MEDICATIONS  (STANDING):  aspirin  chewable 81 milliGRAM(s) Oral daily  chlorhexidine 2% Cloths 1 Application(s) Topical <User Schedule>  donepezil 5 milliGRAM(s) Oral at bedtime  enoxaparin Injectable 40 milliGRAM(s) SubCutaneous every 24 hours  melatonin 3 milliGRAM(s) Oral at bedtime  mupirocin 2% Ointment 1 Application(s) Both Nostrils two times a day  tamsulosin 0.4 milliGRAM(s) Oral at bedtime    MEDICATIONS  (PRN):      __________________________________________________  REVIEW OF SYSTEMS:    Unable to assess due to poor mental status  ALL OTHER  ROS negative        Vital Signs Last 24 Hrs  T(C): 36.3 (12 Jan 2024 05:00), Max: 36.5 (11 Jan 2024 20:38)  T(F): 97.3 (12 Jan 2024 05:00), Max: 97.7 (11 Jan 2024 20:38)  HR: 73 (12 Jan 2024 05:00) (73 - 89)  BP: 111/49 (12 Jan 2024 05:00) (107/80 - 111/49)  BP(mean): 86 (11 Jan 2024 20:38) (86 - 86)  RR: 19 (12 Jan 2024 05:00) (18 - 19)  SpO2: 98% (12 Jan 2024 05:00) (94% - 98%)    Parameters below as of 12 Jan 2024 05:00  Patient On (Oxygen Delivery Method): nasal cannula  O2 Flow (L/min): 2      ________________________________________________  PHYSICAL EXAM:  GENERAL: NAD, frail  HEENT: Normocephalic;  conjunctivae and sclerae clear; moist mucous membranes;   NECK : supple  CHEST/LUNG: Clear to auscultation bilaterally with good air entry   HEART: S1 S2  regular  ABDOMEN: Soft, Nontender, Nondistended; Bowel sounds present  EXTREMITIES: no cyanosis; no edema; no calf tenderness  SKIN: warm and dry; no rash  NERVOUS SYSTEM:  Awake and alert; Oriented  to place, person and time ; no new deficits    _________________________________________________  LABS:                        8.5    34.93 )-----------( 323      ( 11 Jan 2024 06:08 )             27.7     01-11    144  |  111<H>  |  29<H>  ----------------------------<  94  4.0   |  29  |  0.91    Ca    11.1<H>      11 Jan 2024 06:08  Phos  3.1     01-11  Mg     2.2     01-11    TPro  5.5<L>  /  Alb  1.2<L>  /  TBili  0.8  /  DBili  x   /  AST  29  /  ALT  19  /  AlkPhos  144<H>  01-11      Urinalysis Basic - ( 11 Jan 2024 06:08 )    Color: x / Appearance: x / SG: x / pH: x  Gluc: 94 mg/dL / Ketone: x  / Bili: x / Urobili: x   Blood: x / Protein: x / Nitrite: x   Leuk Esterase: x / RBC: x / WBC x   Sq Epi: x / Non Sq Epi: x / Bacteria: x      CAPILLARY BLOOD GLUCOSE            RADIOLOGY & ADDITIONAL TESTS:    Imaging Personally Reviewed:  YES/NO    Consultant(s) Notes Reviewed:   YES/ No    Care Discussed with Consultants :     Plan of care was discussed with patient and /or primary care giver; all questions and concerns were addressed and care was aligned with patient's wishes.     NP Note discussed with  Primary Attending    Patient is a 83y old  Male who presents with a chief complaint of frequent falls, weakness (12 Jan 2024 11:24)      INTERVAL HPI/OVERNIGHT EVENTS:  83 y.o M w/ PMHx of BPH w/ urinary retention, stroke (2013), emphysema, afib not on ac, dementia, erectile dysfunction, vertigo, leukocytosis of unknown origin (currently being worked up at [facility] Dr. 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 Wife states that he has vertigo and balance issues, but he has also been very weak, especially in the past three months. Pt endorses losing 60 pounds in the past 1.5 years, and wife endorses that he has had decreased appetite for the past 3 months. Pt also has intermittent night sweats. Labs significant for elevated WBC. CTH shows no changes from CTH in 11/2023. Xray show no fractures. CXR shows scattered non-specific pulmonary infiltrates. Admitted for ambulatory dysfunction and malignancy workup    Surgery was consulted -- Given inpatient w/u and history of declining mental status w/increasing frailty in setting of likely multifocal malignancy vs metastatic burden, excisional biopsy of RLE mass unlikely to provide any clinical benefit and has subsequently been cancelled. Recommended MRI RLE for possible percutaneous core needle bx by IR, however pt was unable to tolerate MRI with sedation. Sx onc sign off    Hem/onc following -- ·Concern for sarcoma. Patient may also have a prostate primary given elevated PSA (though < 10) with recent MRI of the prostate PIRADS-5. Patient is not a surgical candidate for excision. Urology consulted for BPH/elevated PSA with no acute urologic intervention as inpatient. Cardiology also following with no further inpatient cardiac work up needed. Palliative consulted -- F/U recs. Wife leaning to LTC placement with hospice    PT recs ABRAHAM      MEDICATIONS  (STANDING):  aspirin  chewable 81 milliGRAM(s) Oral daily  chlorhexidine 2% Cloths 1 Application(s) Topical <User Schedule>  donepezil 5 milliGRAM(s) Oral at bedtime  enoxaparin Injectable 40 milliGRAM(s) SubCutaneous every 24 hours  melatonin 3 milliGRAM(s) Oral at bedtime  mupirocin 2% Ointment 1 Application(s) Both Nostrils two times a day  tamsulosin 0.4 milliGRAM(s) Oral at bedtime    MEDICATIONS  (PRN):      __________________________________________________  REVIEW OF SYSTEMS:    Unable to assess due to poor mental status  ALL OTHER  ROS negative        Vital Signs Last 24 Hrs  T(C): 36.3 (12 Jan 2024 05:00), Max: 36.5 (11 Jan 2024 20:38)  T(F): 97.3 (12 Jan 2024 05:00), Max: 97.7 (11 Jan 2024 20:38)  HR: 73 (12 Jan 2024 05:00) (73 - 89)  BP: 111/49 (12 Jan 2024 05:00) (107/80 - 111/49)  BP(mean): 86 (11 Jan 2024 20:38) (86 - 86)  RR: 19 (12 Jan 2024 05:00) (18 - 19)  SpO2: 98% (12 Jan 2024 05:00) (94% - 98%)    Parameters below as of 12 Jan 2024 05:00  Patient On (Oxygen Delivery Method): nasal cannula  O2 Flow (L/min): 2      ________________________________________________  PHYSICAL EXAM:  GENERAL: NAD, frail  HEENT: Normocephalic;  conjunctivae and sclerae clear; moist mucous membranes;   NECK : supple  CHEST/LUNG: Clear to auscultation bilaterally with good air entry   HEART: S1 S2  regular  ABDOMEN: Soft, Nontender, Nondistended; Bowel sounds present  EXTREMITIES: no cyanosis; no edema; no calf tenderness  SKIN: warm and dry; no rash  NERVOUS SYSTEM:  Awake and alert; Oriented  to place, person and time ; no new deficits    _________________________________________________  LABS:                        8.5    34.93 )-----------( 323      ( 11 Jan 2024 06:08 )             27.7     01-11    144  |  111<H>  |  29<H>  ----------------------------<  94  4.0   |  29  |  0.91    Ca    11.1<H>      11 Jan 2024 06:08  Phos  3.1     01-11  Mg     2.2     01-11    TPro  5.5<L>  /  Alb  1.2<L>  /  TBili  0.8  /  DBili  x   /  AST  29  /  ALT  19  /  AlkPhos  144<H>  01-11      Urinalysis Basic - ( 11 Jan 2024 06:08 )    Color: x / Appearance: x / SG: x / pH: x  Gluc: 94 mg/dL / Ketone: x  / Bili: x / Urobili: x   Blood: x / Protein: x / Nitrite: x   Leuk Esterase: x / RBC: x / WBC x   Sq Epi: x / Non Sq Epi: x / Bacteria: x      CAPILLARY BLOOD GLUCOSE            RADIOLOGY & ADDITIONAL TESTS:    Imaging Personally Reviewed:  YES/NO    Consultant(s) Notes Reviewed:   YES/ No    Care Discussed with Consultants :     Plan of care was discussed with patient and /or primary care giver; all questions and concerns were addressed and care was aligned with patient's wishes.     NP Note discussed with  Primary Attending    Patient is a 83y old  Male who presents with a chief complaint of frequent falls, weakness (12 Jan 2024 11:24)      INTERVAL HPI/OVERNIGHT EVENTS:  83 y.o M w/ PMHx of BPH w/ urinary retention, stroke (2013), emphysema, afib not on ac, dementia, erectile dysfunction, vertigo, leukocytosis of unknown origin (currently being worked up at [facility] Dr. 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 Wife states that he has vertigo and balance issues, but he has also been very weak, especially in the past three months. Pt endorses losing 60 pounds in the past 1.5 years, and wife endorses that he has had decreased appetite for the past 3 months. Pt also has intermittent night sweats. Labs significant for elevated WBC. CTH shows no changes from CTH in 11/2023. Xray show no fractures. CXR shows scattered non-specific pulmonary infiltrates. Admitted for ambulatory dysfunction and malignancy workup    Surgery was consulted -- Given inpatient w/u and history of declining mental status w/increasing frailty in setting of likely multifocal malignancy vs metastatic burden, excisional biopsy of RLE mass unlikely to provide any clinical benefit and has subsequently been cancelled. Recommended MRI RLE for possible percutaneous core needle bx by IR, however pt was unable to tolerate MRI with sedation. Sx onc sign off    Hem/onc following -- ·Concern for sarcoma. Patient may also have a prostate primary given elevated PSA (though < 10) with recent MRI of the prostate PIRADS-5. Patient is not a surgical candidate for excision. Urology consulted for BPH/elevated PSA with no acute urologic intervention as inpatient. Cardiology also following with no further inpatient cardiac work up needed. Palliative consulted -- F/U recs. Wife leaning to LTC placement with hospice    PT recs ABRAHAM      MEDICATIONS  (STANDING):  aspirin  chewable 81 milliGRAM(s) Oral daily  chlorhexidine 2% Cloths 1 Application(s) Topical <User Schedule>  donepezil 5 milliGRAM(s) Oral at bedtime  enoxaparin Injectable 40 milliGRAM(s) SubCutaneous every 24 hours  melatonin 3 milliGRAM(s) Oral at bedtime  mupirocin 2% Ointment 1 Application(s) Both Nostrils two times a day  tamsulosin 0.4 milliGRAM(s) Oral at bedtime    MEDICATIONS  (PRN):      __________________________________________________  REVIEW OF SYSTEMS:    Unable to assess due to poor mental status  ALL OTHER  ROS negative        Vital Signs Last 24 Hrs  T(C): 36.3 (12 Jan 2024 05:00), Max: 36.5 (11 Jan 2024 20:38)  T(F): 97.3 (12 Jan 2024 05:00), Max: 97.7 (11 Jan 2024 20:38)  HR: 73 (12 Jan 2024 05:00) (73 - 89)  BP: 111/49 (12 Jan 2024 05:00) (107/80 - 111/49)  BP(mean): 86 (11 Jan 2024 20:38) (86 - 86)  RR: 19 (12 Jan 2024 05:00) (18 - 19)  SpO2: 98% (12 Jan 2024 05:00) (94% - 98%)    Parameters below as of 12 Jan 2024 05:00  Patient On (Oxygen Delivery Method): nasal cannula  O2 Flow (L/min): 2      ________________________________________________  PHYSICAL EXAM:  GENERAL: NAD, frail  HEENT: Normocephalic;  conjunctivae and sclerae clear; moist mucous membranes;   NECK : supple  CHEST/LUNG: Clear to auscultation bilaterally with good air entry   HEART: S1 S2  regular  ABDOMEN: Soft, Nontender, Nondistended; Bowel sounds present  EXTREMITIES: no cyanosis; no edema; no calf tenderness  SKIN: warm and dry; no rash  NERVOUS SYSTEM:  Lethargic    _________________________________________________  LABS:                        8.5    34.93 )-----------( 323      ( 11 Jan 2024 06:08 )             27.7     01-11    144  |  111<H>  |  29<H>  ----------------------------<  94  4.0   |  29  |  0.91    Ca    11.1<H>      11 Jan 2024 06:08  Phos  3.1     01-11  Mg     2.2     01-11    TPro  5.5<L>  /  Alb  1.2<L>  /  TBili  0.8  /  DBili  x   /  AST  29  /  ALT  19  /  AlkPhos  144<H>  01-11      Urinalysis Basic - ( 11 Jan 2024 06:08 )    Color: x / Appearance: x / SG: x / pH: x  Gluc: 94 mg/dL / Ketone: x  / Bili: x / Urobili: x   Blood: x / Protein: x / Nitrite: x   Leuk Esterase: x / RBC: x / WBC x   Sq Epi: x / Non Sq Epi: x / Bacteria: x      CAPILLARY BLOOD GLUCOSE            RADIOLOGY & ADDITIONAL TESTS:  < from: CT Abdomen and Pelvis w/ IV Cont (01.07.24 @ 13:41) >    ACC: [id number] EXAM:  CT ABDOMEN AND PELVIS IC   ORDERED BY: JACKI JARQUIN     ACC: [id number] EXAM:  CT CHEST IC   ORDERED BY: JACKI JARQUIN     PROCEDURE DATE:  01/07/2024          INTERPRETATION:  CLINICAL INFORMATION: Right leg malignancy.    COMPARISON: Outside contrast-enhanced CT of the thorax, abdomen, and   pelvis November 6, 2023.    CONTRAST/COMPLICATIONS:  IV Contrast: Omnipaque 350 (accession [id number]), IV contrast documented   in unlinked concurrent exam (accession [id number])90 cc administered   10   cc discarded  Oral Contrast: NONE  Complications: None reported at time of study completion    PROCEDURE:  CT of the Chest, Abdomen and Pelvis was performed.  Sagittal and coronal reformats were performed.    FINDINGS:  CHEST:  LUNGS AND LARGE AIRWAYS: Patent central airways. There is been interval   increase in size and number of numerous centrally necrotic lung masses   measuring up to 3.7 cm in the left lower lobe compatible with metastatic   disease. Mild right lowerlobe dependent atelectasis.  PLEURA: Trace bilateral layering pleural effusions, right greater than   left.  VESSELS: Within normal limits.  HEART: Heart size is normal. No pericardial effusion.  MEDIASTINUM AND REMY: No lymphadenopathy.  CHEST WALL AND LOWER NECK: Within normal limits.    ABDOMEN AND PELVIS:  LIVER: Within normal limits.  BILE DUCTS: Normal caliber.  GALLBLADDER: Within normal limits.  SPLEEN: Within normal limits.  PANCREAS: Within normal limits.  ADRENALS: Within normal limits.  KIDNEYS/URETERS: Left renal cysts. Otherwise, within normal limits.    BLADDER: Within normal limits.  REPRODUCTIVE ORGANS: The prostate is not enlarged.    BOWEL: Colonic diverticulosis. No bowel obstruction. Appendix is normal.   There is no mesenteric adenopathy.  PERITONEUM: No ascites.  VESSELS: Within normal limits.  RETROPERITONEUM/LYMPH NODES: No lymphadenopathy.  ABDOMINAL WALL: Small fat-containing left inguinal hernia.  BONES: Mild anterolisthesis of L4 on L5. Degenerative disc disease at   L5-S1.. There is been interval increase in size of an incompletely   visualized peripherally enhancing mass in the right medial thigh.    IMPRESSION: Extensive progressive metastatic burden to the lungs.    --- End of Report ---            ARLIN XIAO MD; Attending Radiologist  This document has been electronically signed. Jan 7 2024  5:10PM    < end of copied text >    Imaging Personally Reviewed:  YES/NO    Consultant(s) Notes Reviewed:   YES/ No    Care Discussed with Consultants :     Plan of care was discussed with patient and /or primary care giver; all questions and concerns were addressed and care was aligned with patient's wishes.     NP Note discussed with  Primary Attending    Patient is a 83y old  Male who presents with a chief complaint of frequent falls, weakness (12 Jan 2024 11:24)      INTERVAL HPI/OVERNIGHT EVENTS:  83 y.o M w/ PMHx of BPH w/ urinary retention, stroke (2013), emphysema, afib not on ac, dementia, erectile dysfunction, vertigo, leukocytosis of unknown origin (currently being worked up at [facility] Dr. 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 Wife states that he has vertigo and balance issues, but he has also been very weak, especially in the past three months. Pt endorses losing 60 pounds in the past 1.5 years, and wife endorses that he has had decreased appetite for the past 3 months. Pt also has intermittent night sweats. Labs significant for elevated WBC. CTH shows no changes from CTH in 11/2023. Xray show no fractures. CXR shows scattered non-specific pulmonary infiltrates. Admitted for ambulatory dysfunction and malignancy workup    Surgery was consulted -- Given inpatient w/u and history of declining mental status w/increasing frailty in setting of likely multifocal malignancy vs metastatic burden, excisional biopsy of RLE mass unlikely to provide any clinical benefit and has subsequently been cancelled. Recommended MRI RLE for possible percutaneous core needle bx by IR, however pt was unable to tolerate MRI with sedation. Sx onc sign off    Hem/onc following -- ·Concern for sarcoma. Patient may also have a prostate primary given elevated PSA (though < 10) with recent MRI of the prostate PIRADS-5. Patient is not a surgical candidate for excision. Urology consulted for BPH/elevated PSA with no acute urologic intervention as inpatient. Cardiology also following with no further inpatient cardiac work up needed. Palliative consulted -- F/U recs. Wife leaning to LTC placement with hospice    PT recs ABRAHAM      MEDICATIONS  (STANDING):  aspirin  chewable 81 milliGRAM(s) Oral daily  chlorhexidine 2% Cloths 1 Application(s) Topical <User Schedule>  donepezil 5 milliGRAM(s) Oral at bedtime  enoxaparin Injectable 40 milliGRAM(s) SubCutaneous every 24 hours  melatonin 3 milliGRAM(s) Oral at bedtime  mupirocin 2% Ointment 1 Application(s) Both Nostrils two times a day  tamsulosin 0.4 milliGRAM(s) Oral at bedtime    MEDICATIONS  (PRN):      __________________________________________________  REVIEW OF SYSTEMS:    Unable to assess due to poor mental status  ALL OTHER  ROS negative        Vital Signs Last 24 Hrs  T(C): 36.3 (12 Jan 2024 05:00), Max: 36.5 (11 Jan 2024 20:38)  T(F): 97.3 (12 Jan 2024 05:00), Max: 97.7 (11 Jan 2024 20:38)  HR: 73 (12 Jan 2024 05:00) (73 - 89)  BP: 111/49 (12 Jan 2024 05:00) (107/80 - 111/49)  BP(mean): 86 (11 Jan 2024 20:38) (86 - 86)  RR: 19 (12 Jan 2024 05:00) (18 - 19)  SpO2: 98% (12 Jan 2024 05:00) (94% - 98%)    Parameters below as of 12 Jan 2024 05:00  Patient On (Oxygen Delivery Method): nasal cannula  O2 Flow (L/min): 2      ________________________________________________  PHYSICAL EXAM:  GENERAL: NAD, frail  HEENT: Normocephalic;  conjunctivae and sclerae clear; moist mucous membranes;   NECK : supple  CHEST/LUNG: Clear to auscultation bilaterally with good air entry   HEART: S1 S2  regular  ABDOMEN: Soft, Nontender, Nondistended; Bowel sounds present  EXTREMITIES: no cyanosis; no edema; no calf tenderness  SKIN: warm and dry; no rash  NERVOUS SYSTEM:  Lethargic    _________________________________________________  LABS:                        8.5    34.93 )-----------( 323      ( 11 Jan 2024 06:08 )             27.7     01-11    144  |  111<H>  |  29<H>  ----------------------------<  94  4.0   |  29  |  0.91    Ca    11.1<H>      11 Jan 2024 06:08  Phos  3.1     01-11  Mg     2.2     01-11    TPro  5.5<L>  /  Alb  1.2<L>  /  TBili  0.8  /  DBili  x   /  AST  29  /  ALT  19  /  AlkPhos  144<H>  01-11      Urinalysis Basic - ( 11 Jan 2024 06:08 )    Color: x / Appearance: x / SG: x / pH: x  Gluc: 94 mg/dL / Ketone: x  / Bili: x / Urobili: x   Blood: x / Protein: x / Nitrite: x   Leuk Esterase: x / RBC: x / WBC x   Sq Epi: x / Non Sq Epi: x / Bacteria: x      CAPILLARY BLOOD GLUCOSE            RADIOLOGY & ADDITIONAL TESTS:  < from: CT Abdomen and Pelvis w/ IV Cont (01.07.24 @ 13:41) >    ACC: [id number] EXAM:  CT ABDOMEN AND PELVIS IC   ORDERED BY: JACKI JARQUIN     ACC: [id number] EXAM:  CT CHEST IC   ORDERED BY: JACKI JARQUIN     PROCEDURE DATE:  01/07/2024          INTERPRETATION:  CLINICAL INFORMATION: Right leg malignancy.    COMPARISON: Outside contrast-enhanced CT of the thorax, abdomen, and   pelvis November 6, 2023.    CONTRAST/COMPLICATIONS:  IV Contrast: Omnipaque 350 (accession [id number]), IV contrast documented   in unlinked concurrent exam (accession [id number] cc administered   10   cc discarded  Oral Contrast: NONE  Complications: None reported at time of study completion    PROCEDURE:  CT of the Chest, Abdomen and Pelvis was performed.  Sagittal and coronal reformats were performed.    FINDINGS:  CHEST:  LUNGS AND LARGE AIRWAYS: Patent central airways. There is been interval   increase in size and number of numerous centrally necrotic lung masses   measuring up to 3.7 cm in the left lower lobe compatible with metastatic   disease. Mild right lowerlobe dependent atelectasis.  PLEURA: Trace bilateral layering pleural effusions, right greater than   left.  VESSELS: Within normal limits.  HEART: Heart size is normal. No pericardial effusion.  MEDIASTINUM AND REMY: No lymphadenopathy.  CHEST WALL AND LOWER NECK: Within normal limits.    ABDOMEN AND PELVIS:  LIVER: Within normal limits.  BILE DUCTS: Normal caliber.  GALLBLADDER: Within normal limits.  SPLEEN: Within normal limits.  PANCREAS: Within normal limits.  ADRENALS: Within normal limits.  KIDNEYS/URETERS: Left renal cysts. Otherwise, within normal limits.    BLADDER: Within normal limits.  REPRODUCTIVE ORGANS: The prostate is not enlarged.    BOWEL: Colonic diverticulosis. No bowel obstruction. Appendix is normal.   There is no mesenteric adenopathy.  PERITONEUM: No ascites.  VESSELS: Within normal limits.  RETROPERITONEUM/LYMPH NODES: No lymphadenopathy.  ABDOMINAL WALL: Small fat-containing left inguinal hernia.  BONES: Mild anterolisthesis of L4 on L5. Degenerative disc disease at   L5-S1.. There is been interval increase in size of an incompletely   visualized peripherally enhancing mass in the right medial thigh.    IMPRESSION: Extensive progressive metastatic burden to the lungs.    --- End of Report ---            ARLIN XIAO MD; Attending Radiologist  This document has been electronically signed. Jan 7 2024  5:10PM    < end of copied text >    Imaging Personally Reviewed:  YES/NO    Consultant(s) Notes Reviewed:   YES/ No    Care Discussed with Consultants :     Plan of care was discussed with patient and /or primary care giver; all questions and concerns were addressed and care was aligned with patient's wishes.

## 2024-01-12 NOTE — PROGRESS NOTE ADULT - PROBLEM SELECTOR PLAN 6
Clinical evidence indicates that the patient has Severe protein calorie malnutrition/ 3rd degree    In context of      Chronic Illness (>1 month)--progressive vascular dementia, now with metastatic cancer of unknown primary site, as evidenced by:    Energy/Food intake <50% of estimated energy requirement >5 days  Weight loss: Moderate - severe (lbs lost recently)  Body Fat loss: Severe   + mild temporal wasting  Muscle mass loss: Severe  -- albumin now 1.2   Strength: weakened severe (bedbound)    Recommend:   Continue soft and bite sized diet as tolerated - aspiration precautions, careful hand-feeding, teaching to caregivers  On supplementation with Ensure Enlive BID    Family undecided about long term feeding at this time--GOC discussions ongoing

## 2024-01-12 NOTE — PROGRESS NOTE ADULT - SUBJECTIVE AND OBJECTIVE BOX
DATE OF SERVICE: 01-12-24    remains solmenent     aspirin  chewable 81 milliGRAM(s) Oral daily  chlorhexidine 2% Cloths 1 Application(s) Topical <User Schedule>  donepezil 5 milliGRAM(s) Oral at bedtime  enoxaparin Injectable 40 milliGRAM(s) SubCutaneous every 24 hours  melatonin 3 milliGRAM(s) Oral at bedtime  mupirocin 2% Ointment 1 Application(s) Both Nostrils two times a day  tamsulosin 0.4 milliGRAM(s) Oral at bedtime                            8.5    34.93 )-----------( 323      ( 11 Jan 2024 06:08 )             27.7       Hemoglobin: 8.5 g/dL (01-11 @ 06:08)  Hemoglobin: 8.1 g/dL (01-10 @ 07:06)  Hemoglobin: 8.9 g/dL (01-09 @ 10:08)  Hemoglobin: 8.4 g/dL (01-09 @ 07:02)  Hemoglobin: 9.3 g/dL (01-08 @ 07:46)      01-11    144  |  111<H>  |  29<H>  ----------------------------<  94  4.0   |  29  |  0.91    Ca    11.1<H>      11 Jan 2024 06:08  Phos  3.1     01-11  Mg     2.2     01-11    TPro  5.5<L>  /  Alb  1.2<L>  /  TBili  0.8  /  DBili  x   /  AST  29  /  ALT  19  /  AlkPhos  144<H>  01-11    Creatinine Trend: 0.91<--, 0.82<--, 0.90<--, 0.99<--, 0.81<--, 0.79<--    COAGS:           T(C): 36.3 (01-12-24 @ 05:00), Max: 36.5 (01-11-24 @ 12:16)  HR: 73 (01-12-24 @ 05:00) (73 - 89)  BP: 111/49 (01-12-24 @ 05:00) (107/80 - 111/53)  RR: 19 (01-12-24 @ 05:00) (18 - 19)  SpO2: 98% (01-12-24 @ 05:00) (94% - 98%)  Wt(kg): --    I&O's Summary      HEENT:  (-)icterus (-)pallor  CV: N S1 S2 1/6 DANNY (+)2 Pulses B/l  Resp:  Clear to ausculatation B/L, normal effort  GI: (+) BS Soft, NT, ND  Lymph:  (-)Edema, (-)obvious lymphadenopathy  Skin: Warm to touch, Normal turgor  Psych: Unable to adequately assess mood and affect    ASSESSMENT/PLAN: 	83y Male  PMHx of BPH w/ urinary retention, stroke (2013), HTN, DM, afib not on ac, dementia, erectile dysfunction, vertigo, leukocytosis of unknown origin (currently being worked up at [facility] Dr. 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    # Abnormal EKG  - Appear to have wondering atrial pacemaker  - tachy overnight, no complaints of palpitation   - ECHO noted , normal LV fx  - No need for further inpatient cardiac work up.    # Fall  - No evidence of LOC    # Leukocytosis  - Heme/onc f/u  - Surgery f/u noted    Fredi Wilson MD, [facility]  BEEPER [phone]     DATE OF SERVICE: 01-12-24    remains solmenent     aspirin  chewable 81 milliGRAM(s) Oral daily  chlorhexidine 2% Cloths 1 Application(s) Topical <User Schedule>  donepezil 5 milliGRAM(s) Oral at bedtime  enoxaparin Injectable 40 milliGRAM(s) SubCutaneous every 24 hours  melatonin 3 milliGRAM(s) Oral at bedtime  mupirocin 2% Ointment 1 Application(s) Both Nostrils two times a day  tamsulosin 0.4 milliGRAM(s) Oral at bedtime                            8.5    34.93 )-----------( 323      ( 11 Jan 2024 06:08 )             27.7       Hemoglobin: 8.5 g/dL (01-11 @ 06:08)  Hemoglobin: 8.1 g/dL (01-10 @ 07:06)  Hemoglobin: 8.9 g/dL (01-09 @ 10:08)  Hemoglobin: 8.4 g/dL (01-09 @ 07:02)  Hemoglobin: 9.3 g/dL (01-08 @ 07:46)      01-11    144  |  111<H>  |  29<H>  ----------------------------<  94  4.0   |  29  |  0.91    Ca    11.1<H>      11 Jan 2024 06:08  Phos  3.1     01-11  Mg     2.2     01-11    TPro  5.5<L>  /  Alb  1.2<L>  /  TBili  0.8  /  DBili  x   /  AST  29  /  ALT  19  /  AlkPhos  144<H>  01-11    Creatinine Trend: 0.91<--, 0.82<--, 0.90<--, 0.99<--, 0.81<--, 0.79<--    COAGS:           T(C): 36.3 (01-12-24 @ 05:00), Max: 36.5 (01-11-24 @ 12:16)  HR: 73 (01-12-24 @ 05:00) (73 - 89)  BP: 111/49 (01-12-24 @ 05:00) (107/80 - 111/53)  RR: 19 (01-12-24 @ 05:00) (18 - 19)  SpO2: 98% (01-12-24 @ 05:00) (94% - 98%)  Wt(kg): --    I&O's Summary      HEENT:  (-)icterus (-)pallor  CV: N S1 S2 1/6 DANNY (+)2 Pulses B/l  Resp:  Clear to ausculatation B/L, normal effort  GI: (+) BS Soft, NT, ND  Lymph:  (-)Edema, (-)obvious lymphadenopathy  Skin: Warm to touch, Normal turgor  Psych: Unable to adequately assess mood and affect    ASSESSMENT/PLAN: 	83y Male  PMHx of BPH w/ urinary retention, stroke (2013), HTN, DM, afib not on ac, dementia, erectile dysfunction, vertigo, leukocytosis of unknown origin (currently being worked up at [facility] Dr. 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    # Abnormal EKG  - Appear to have wondering atrial pacemaker  - tachy overnight, no complaints of palpitation   - ECHO noted , normal LV fx  - No need for further inpatient cardiac work up.    # Fall  - No evidence of LOC    # Leukocytosis  - Heme/onc f/u  - Surgery f/u noted    Fredi Wilson MD, [facility]  BEEPER [phone]

## 2024-01-12 NOTE — PROGRESS NOTE ADULT - ASSESSMENT
83 y.o M w/ PMHx of BPH w/ urinary retention, stroke (2013), emphysema, afib not on ac, dementia, erectile dysfunction, vertigo, leukocytosis of unknown origin (currently being worked up at [facility] Dr. 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 Admitted for ambulatory dysfunction and malignancy workup 83 y.o M w/ PMHx of BPH w/ urinary retention, stroke (2013), emphysema, afib not on ac, dementia, erectile dysfunction, vertigo, leukocytosis of unknown origin (currently being worked up at [facility] Dr. 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 Admitted for ambulatory dysfunction and malignancy workup

## 2024-01-12 NOTE — PROGRESS NOTE ADULT - SUBJECTIVE AND OBJECTIVE BOX
follow up on:  complex medical decision making related to goals of care    [facility] Geriatric and Palliative Consult Service:  Tamela Vasquez DO: cell ([phone])  Raf Rodas MD: cell ([phone])  Benito Osorio NP: cell ([phone])   Toni Rico LMSW: cell ([phone])   Sharon Rousseau NP: via [employer] Teams    Can contact any Palliative Team member via [employer] Teams for consults and questions      OVERNIGHT EVENTS:    Present Symptoms: Mild, Moderate, Severe  Pain:             Location -                               Aggravating factors -             Quality -             Radiation -             Timing-             Severity (0-10 scale):             Minimal acceptable level (0-10 scale):  Fatigue:  Nausea:  Lack of Appetite:   SOB:  Depression:  Anxiety:  Review of Systems: [All others negative or Unable to obtain due to poor mentation]    CPOT:    https://ccs-st.org/resources/Documents/Sedation%20Analgesia%20Delirium%20in%20CC/CCS%20STH%20Guideline%20template%20CPOT.pdf  PAIN AD Score:   [email or web address]://[email or web address]/[email or web address] (press ctrl +  left click to view)MEDICATIONS  (STANDING):  aspirin  chewable 81 milliGRAM(s) Oral daily  chlorhexidine 2% Cloths 1 Application(s) Topical <User Schedule>  donepezil 5 milliGRAM(s) Oral at bedtime  enoxaparin Injectable 40 milliGRAM(s) SubCutaneous every 24 hours  melatonin 3 milliGRAM(s) Oral at bedtime  mupirocin 2% Ointment 1 Application(s) Both Nostrils two times a day  tamsulosin 0.4 milliGRAM(s) Oral at bedtime    MEDICATIONS  (PRN):  LORazepam   Injectable 1 milliGRAM(s) IV Push every 6 hours PRN Agitation      PHYSICAL EXAM:  Vital Signs Last 24 Hrs  T(C): 36.8 (12 Jan 2024 21:48), Max: 36.8 (12 Jan 2024 21:48)  T(F): 98.3 (12 Jan 2024 21:48), Max: 98.3 (12 Jan 2024 21:48)  HR: 80 (12 Jan 2024 21:48) (73 - 83)  BP: 129/90 (12 Jan 2024 21:48) (111/49 - 129/90)  BP(mean): --  RR: 20 (12 Jan 2024 21:48) (18 - 20)  SpO2: 100% (12 Jan 2024 21:48) (94% - 100%)    Parameters below as of 12 Jan 2024 21:48  Patient On (Oxygen Delivery Method): nasal cannula  O2 Flow (L/min): 2      General: alert  oriented x ____    lethargic distressed cachexia  verbal nonverbal  unarousable     Palliative Performance Scale/Karnofsky Score:  http://npcrc.org/files/news/palliative_performance_scale_ppsv2.pdf    HEENT: no abnormal lesion, dry mouth  ET tube/trach oral lesions:  Lungs: tachypnea/labored breathing, audible excessive secretions  CV: RRR, S1S2, tachycardia  GI: soft non distended non tender  incontinent               PEG/NG/OG tube  constipation  last BM:   : incontinent  oliguria/anuria  mckeon  Musculoskeletal: weakness x4 edema x4    ambulatory with assistance   mostly/fully bedbound/wheelchair bound  Skin: no abnormal skin lesions, poor skin turgor, pressure ulcers stage:   Neuro: no deficits, mild cognitive impairment dsyphagia/dysarthria paresis  Oral intake ability: unable/only mouth care, minimal moderate full capability    LABS:                          8.5    34.93 )-----------( 323      ( 11 Jan 2024 06:08 )             27.7     01-11    144  |  111<H>  |  29<H>  ----------------------------<  94  4.0   |  29  |  0.91    Ca    11.1<H>      11 Jan 2024 06:08  Phos  3.1     01-11  Mg     2.2     01-11    TPro  5.5<L>  /  Alb  1.2<L>  /  TBili  0.8  /  DBili  x   /  AST  29  /  ALT  19  /  AlkPhos  144<H>  01-11    Urinalysis Basic - ( 11 Jan 2024 06:08 )    Color: x / Appearance: x / SG: x / pH: x  Gluc: 94 mg/dL / Ketone: x  / Bili: x / Urobili: x   Blood: x / Protein: x / Nitrite: x   Leuk Esterase: x / RBC: x / WBC x   Sq Epi: x / Non Sq Epi: x / Bacteria: x        RADIOLOGY & ADDITIONAL STUDIES: follow up on:  complex medical decision making related to goals of care    [facility] Geriatric and Palliative Consult Service:  Tamela Vasquez DO: cell ([phone])  Raf Rodas MD: cell ([phone])  Benito Osorio NP: cell ([phone])   Toni Rico LMSW: cell ([phone])   Sharon Rousseau NP: via [facility] Teams    Can contact any Palliative Team member via [facility] Teams for consults and questions      OVERNIGHT EVENTS:    Present Symptoms: Mild, Moderate, Severe  Pain:             Location -                               Aggravating factors -             Quality -             Radiation -             Timing-             Severity (0-10 scale):             Minimal acceptable level (0-10 scale):  Fatigue:  Nausea:  Lack of Appetite:   SOB:  Depression:  Anxiety:  Review of Systems: [All others negative or Unable to obtain due to poor mentation]    CPOT:    https://ccs-st.org/resources/Documents/Sedation%20Analgesia%20Delirium%20in%20CC/CCS%20STH%20Guideline%20template%20CPOT.pdf  PAIN AD Score:   http://geriatrictoolkit.[facility]/cog/painad.pdf (press ctrl +  left click to view)MEDICATIONS  (STANDING):  aspirin  chewable 81 milliGRAM(s) Oral daily  chlorhexidine 2% Cloths 1 Application(s) Topical <User Schedule>  donepezil 5 milliGRAM(s) Oral at bedtime  enoxaparin Injectable 40 milliGRAM(s) SubCutaneous every 24 hours  melatonin 3 milliGRAM(s) Oral at bedtime  mupirocin 2% Ointment 1 Application(s) Both Nostrils two times a day  tamsulosin 0.4 milliGRAM(s) Oral at bedtime    MEDICATIONS  (PRN):  LORazepam   Injectable 1 milliGRAM(s) IV Push every 6 hours PRN Agitation      PHYSICAL EXAM:  Vital Signs Last 24 Hrs  T(C): 36.8 (12 Jan 2024 21:48), Max: 36.8 (12 Jan 2024 21:48)  T(F): 98.3 (12 Jan 2024 21:48), Max: 98.3 (12 Jan 2024 21:48)  HR: 80 (12 Jan 2024 21:48) (73 - 83)  BP: 129/90 (12 Jan 2024 21:48) (111/49 - 129/90)  BP(mean): --  RR: 20 (12 Jan 2024 21:48) (18 - 20)  SpO2: 100% (12 Jan 2024 21:48) (94% - 100%)    Parameters below as of 12 Jan 2024 21:48  Patient On (Oxygen Delivery Method): nasal cannula  O2 Flow (L/min): 2      General: alert  oriented x ____    lethargic distressed cachexia  verbal nonverbal  unarousable     Palliative Performance Scale/Karnofsky Score:  http://npcrc.org/files/news/palliative_performance_scale_ppsv2.pdf    HEENT: no abnormal lesion, dry mouth  ET tube/trach oral lesions:  Lungs: tachypnea/labored breathing, audible excessive secretions  CV: RRR, S1S2, tachycardia  GI: soft non distended non tender  incontinent               PEG/NG/OG tube  constipation  last BM:   : incontinent  oliguria/anuria  mckeon  Musculoskeletal: weakness x4 edema x4    ambulatory with assistance   mostly/fully bedbound/wheelchair bound  Skin: no abnormal skin lesions, poor skin turgor, pressure ulcers stage:   Neuro: no deficits, mild cognitive impairment dsyphagia/dysarthria paresis  Oral intake ability: unable/only mouth care, minimal moderate full capability    LABS:                          8.5    34.93 )-----------( 323      ( 11 Jan 2024 06:08 )             27.7     01-11    144  |  111<H>  |  29<H>  ----------------------------<  94  4.0   |  29  |  0.91    Ca    11.1<H>      11 Jan 2024 06:08  Phos  3.1     01-11  Mg     2.2     01-11    TPro  5.5<L>  /  Alb  1.2<L>  /  TBili  0.8  /  DBili  x   /  AST  29  /  ALT  19  /  AlkPhos  144<H>  01-11    Urinalysis Basic - ( 11 Jan 2024 06:08 )    Color: x / Appearance: x / SG: x / pH: x  Gluc: 94 mg/dL / Ketone: x  / Bili: x / Urobili: x   Blood: x / Protein: x / Nitrite: x   Leuk Esterase: x / RBC: x / WBC x   Sq Epi: x / Non Sq Epi: x / Bacteria: x        RADIOLOGY & ADDITIONAL STUDIES: follow up on:  complex medical decision making related to goals of care    [facility] Geriatric and Palliative Consult Service:  Tamela Vasquez DO: cell ([phone])  Raf Rodas MD: cell ([phone])  Benito Osorio NP: cell ([phone])   Toni Rico LMSW: cell ([phone])   Sharon Rousseau NP: via [employer] Teams    Can contact any Palliative Team member via [employer] Teams for consults and questions      OVERNIGHT EVENTS:    Present Symptoms: Mild, Moderate, Severe  Pain:             Location -                               Aggravating factors -             Quality -             Radiation -             Timing-             Severity (0-10 scale):             Minimal acceptable level (0-10 scale):  Fatigue:  Nausea:  Lack of Appetite:   SOB:  Depression:  Anxiety:  Review of Systems: [All others negative or Unable to obtain due to poor mentation]    CPOT:    https://ccs-st.org/resources/Documents/Sedation%20Analgesia%20Delirium%20in%20CC/CCS%20STH%20Guideline%20template%20CPOT.pdf  PAIN AD Score:   http://[email or web address]/[email or web address] (press ctrl +  left click to view)MEDICATIONS  (STANDING):  aspirin  chewable 81 milliGRAM(s) Oral daily  chlorhexidine 2% Cloths 1 Application(s) Topical <User Schedule>  donepezil 5 milliGRAM(s) Oral at bedtime  enoxaparin Injectable 40 milliGRAM(s) SubCutaneous every 24 hours  melatonin 3 milliGRAM(s) Oral at bedtime  mupirocin 2% Ointment 1 Application(s) Both Nostrils two times a day  tamsulosin 0.4 milliGRAM(s) Oral at bedtime    MEDICATIONS  (PRN):  LORazepam   Injectable 1 milliGRAM(s) IV Push every 6 hours PRN Agitation      PHYSICAL EXAM:  Vital Signs Last 24 Hrs  T(C): 36.8 (12 Jan 2024 21:48), Max: 36.8 (12 Jan 2024 21:48)  T(F): 98.3 (12 Jan 2024 21:48), Max: 98.3 (12 Jan 2024 21:48)  HR: 80 (12 Jan 2024 21:48) (73 - 83)  BP: 129/90 (12 Jan 2024 21:48) (111/49 - 129/90)  BP(mean): --  RR: 20 (12 Jan 2024 21:48) (18 - 20)  SpO2: 100% (12 Jan 2024 21:48) (94% - 100%)    Parameters below as of 12 Jan 2024 21:48  Patient On (Oxygen Delivery Method): nasal cannula  O2 Flow (L/min): 2      General: alert  oriented x ____    lethargic distressed cachexia  verbal nonverbal  unarousable     Palliative Performance Scale/Karnofsky Score:  http://npcrc.org/files/news/palliative_performance_scale_ppsv2.pdf    HEENT: no abnormal lesion, dry mouth  ET tube/trach oral lesions:  Lungs: tachypnea/labored breathing, audible excessive secretions  CV: RRR, S1S2, tachycardia  GI: soft non distended non tender  incontinent               PEG/NG/OG tube  constipation  last BM:   : incontinent  oliguria/anuria  mckeon  Musculoskeletal: weakness x4 edema x4    ambulatory with assistance   mostly/fully bedbound/wheelchair bound  Skin: no abnormal skin lesions, poor skin turgor, pressure ulcers stage:   Neuro: no deficits, mild cognitive impairment dsyphagia/dysarthria paresis  Oral intake ability: unable/only mouth care, minimal moderate full capability    LABS:                          8.5    34.93 )-----------( 323      ( 11 Jan 2024 06:08 )             27.7     01-11    144  |  111<H>  |  29<H>  ----------------------------<  94  4.0   |  29  |  0.91    Ca    11.1<H>      11 Jan 2024 06:08  Phos  3.1     01-11  Mg     2.2     01-11    TPro  5.5<L>  /  Alb  1.2<L>  /  TBili  0.8  /  DBili  x   /  AST  29  /  ALT  19  /  AlkPhos  144<H>  01-11    Urinalysis Basic - ( 11 Jan 2024 06:08 )    Color: x / Appearance: x / SG: x / pH: x  Gluc: 94 mg/dL / Ketone: x  / Bili: x / Urobili: x   Blood: x / Protein: x / Nitrite: x   Leuk Esterase: x / RBC: x / WBC x   Sq Epi: x / Non Sq Epi: x / Bacteria: x        RADIOLOGY & ADDITIONAL STUDIES: follow up on:  complex medical decision making related to goals of care    [facility] Geriatric and Palliative Consult Service:  Tamela Vasquez DO: cell ([phone])  Raf Rodas MD: cell ([phone])  Benito Osorio NP: cell ([phone])   Toni Rico LMSW: cell ([phone])   Sharon Rousseau NP: via [email or web address] Teams    Can contact any Palliative Team member via [email or web address] Teams for consults and questions      OVERNIGHT EVENTS:  None    Patient examined at bedside earlier this afternoon.  Remains alert and oriented x 1 to name only.  More awake, but also much more agitated.  Speech remains mostly incoherent.  Unable to answer if he is in pain, but clearly in pain and grimacing upon physical examination (see below).  No reports of SOB, nausea, or vomiting.  Continued poor oral intake per nursing staff.  No other acute issues reported.    Present Symptoms: Mild, Moderate, Severe  Pain:  Agitated, unable to verbalize, CPOT 4      Review of Systems:   Unable to obtain due to poor mentation/agitation/dementia    CPOT:  4  https://ccs-st.org/resources/Documents/Sedation%20Analgesia%20Delirium%20in%20CC/CCS%20STH%20Guideline%20template%20CPOT.pdf  PAIN AD Score:  5  http://geriatrictoolkit.[facility]/cog/[email or web address] (press ctrl +  left click to view)    MEDICATIONS  (STANDING):  aspirin  chewable 81 milliGRAM(s) Oral daily  chlorhexidine 2% Cloths 1 Application(s) Topical <User Schedule>  donepezil 5 milliGRAM(s) Oral at bedtime  enoxaparin Injectable 40 milliGRAM(s) SubCutaneous every 24 hours  melatonin 3 milliGRAM(s) Oral at bedtime  mupirocin 2% Ointment 1 Application(s) Both Nostrils two times a day  tamsulosin 0.4 milliGRAM(s) Oral at bedtime    MEDICATIONS  (PRN):  LORazepam   Injectable 1 milliGRAM(s) IV Push every 6 hours PRN Agitation      PHYSICAL EXAM:  Vital Signs Last 24 Hrs  T(C): 36.8 (12 Jan 2024 21:48), Max: 36.8 (12 Jan 2024 21:48)  T(F): 98.3 (12 Jan 2024 21:48), Max: 98.3 (12 Jan 2024 21:48)  HR: 80 (12 Jan 2024 21:48) (73 - 83)  BP: 129/90 (12 Jan 2024 21:48) (111/49 - 129/90)  BP(mean): --  RR: 20 (12 Jan 2024 21:48) (18 - 20)  SpO2: 100% (12 Jan 2024 21:48) (94% - 100%)    Parameters below as of 12 Jan 2024 21:48  Patient On (Oxygen Delivery Method): nasal cannula  O2 Flow (L/min): 2      General: alert  oriented x _1___ to name only   more awake but agitated, + temporal wasting, in mild distress    Palliative Performance Scale/Karnofsky Score:  30%  http://[email or web address].org/files/news/palliative_performance_scale_[email or web address].pdf    HEENT:  EOMI anicteric, pharynx clear, MM sl dry  Lungs:  minimal resting tachypnea noted, otherwise clear to auscultation, no secretions noted  CV: RRR,  normal S1 and S2, no murmur  GI: soft non distended non tender  + bowel sounds  : incontinent    Musculoskeletal: weakness x4  in all extremities, essentially bedbound, ++ hard mass of R medial thigh, bilateral legs and thigh mass exquisitely tender to palpation, no edema noted  Skin: no abnormal skin lesions or DU noted  Neuro: no focal deficits, ++ severe cognitive impairment  + dysphagia   Oral intake ability:  minimal to moderate capability, on soft and bite sized diet      LABS:                          8.5    34.93 )-----------( 323      ( 11 Jan 2024 06:08 )             27.7     01-11    144  |  111<H>  |  29<H>  ----------------------------<  94  4.0   |  29  |  0.91    Ca    11.1<H>      11 Jan 2024 06:08  Phos  3.1     01-11  Mg     2.2     01-11    TPro  5.5<L>  /  Alb  1.2<L>  /  TBili  0.8  /  DBili  x   /  AST  29  /  ALT  19  /  AlkPhos  144<H>  01-11    Urinalysis Basic - ( 11 Jan 2024 06:08 )    Color: x / Appearance: x / SG: x / pH: x  Gluc: 94 mg/dL / Ketone: x  / Bili: x / Urobili: x   Blood: x / Protein: x / Nitrite: x   Leuk Esterase: x / RBC: x / WBC x   Sq Epi: x / Non Sq Epi: x / Bacteria: x        RADIOLOGY & ADDITIONAL STUDIES: follow up on:  complex medical decision making related to goals of care    [facility] Geriatric and Palliative Consult Service:  Tamela Vasquez DO: cell ([phone])  Raf Rodas MD: cell ([phone])  Benito Osorio NP: cell ([phone])   Toni Rico LMSW: cell ([phone])   Sharon Rousseau NP: via [email or web address] Teams    Can contact any Palliative Team member via [email or web address] Teams for consults and questions      OVERNIGHT EVENTS:  None    Patient examined at bedside earlier this afternoon.  Remains alert and oriented x 1 to name only.  More awake, but also much more agitated.  Speech remains mostly incoherent.  Unable to answer if he is in pain, but clearly in pain and grimacing upon physical examination (see below).  No reports of SOB, nausea, or vomiting.  Continued poor oral intake per nursing staff.  No other acute issues reported.    Present Symptoms: Mild, Moderate, Severe  Pain:  Agitated, unable to verbalize, CPOT 4      Review of Systems:   Unable to obtain due to poor mentation/agitation/dementia    CPOT:  4  https://ccs-st.org/resources/Documents/Sedation%20Analgesia%20Delirium%20in%20CC/CCS%20STH%20Guideline%20template%20CPOT.pdf  PAIN AD Score:  5  http://geriatrictoolkit.[facility]/cog/painad.pdf (press ctrl +  left click to view)    MEDICATIONS  (STANDING):  aspirin  chewable 81 milliGRAM(s) Oral daily  chlorhexidine 2% Cloths 1 Application(s) Topical <User Schedule>  donepezil 5 milliGRAM(s) Oral at bedtime  enoxaparin Injectable 40 milliGRAM(s) SubCutaneous every 24 hours  melatonin 3 milliGRAM(s) Oral at bedtime  mupirocin 2% Ointment 1 Application(s) Both Nostrils two times a day  tamsulosin 0.4 milliGRAM(s) Oral at bedtime    MEDICATIONS  (PRN):  LORazepam   Injectable 1 milliGRAM(s) IV Push every 6 hours PRN Agitation      PHYSICAL EXAM:  Vital Signs Last 24 Hrs  T(C): 36.8 (12 Jan 2024 21:48), Max: 36.8 (12 Jan 2024 21:48)  T(F): 98.3 (12 Jan 2024 21:48), Max: 98.3 (12 Jan 2024 21:48)  HR: 80 (12 Jan 2024 21:48) (73 - 83)  BP: 129/90 (12 Jan 2024 21:48) (111/49 - 129/90)  BP(mean): --  RR: 20 (12 Jan 2024 21:48) (18 - 20)  SpO2: 100% (12 Jan 2024 21:48) (94% - 100%)    Parameters below as of 12 Jan 2024 21:48  Patient On (Oxygen Delivery Method): nasal cannula  O2 Flow (L/min): 2      General: alert  oriented x _1___ to name only   more awake but agitated, + temporal wasting, in mild distress    Palliative Performance Scale/Karnofsky Score:  30%  http://[facility].org/files/news/palliative_performance_scale_ppsv2.pdf    HEENT:  EOMI anicteric, pharynx clear, MM sl dry  Lungs:  minimal resting tachypnea noted, otherwise clear to auscultation, no secretions noted  CV: RRR,  normal S1 and S2, no murmur  GI: soft non distended non tender  + bowel sounds  : incontinent    Musculoskeletal: weakness x4  in all extremities, essentially bedbound, ++ hard mass of R medial thigh, bilateral legs and thigh mass exquisitely tender to palpation, no edema noted  Skin: no abnormal skin lesions or DU noted  Neuro: no focal deficits, ++ severe cognitive impairment  + dysphagia   Oral intake ability:  minimal to moderate capability, on soft and bite sized diet      LABS:                          8.5    34.93 )-----------( 323      ( 11 Jan 2024 06:08 )             27.7     01-11    144  |  111<H>  |  29<H>  ----------------------------<  94  4.0   |  29  |  0.91    Ca    11.1<H>      11 Jan 2024 06:08  Phos  3.1     01-11  Mg     2.2     01-11    TPro  5.5<L>  /  Alb  1.2<L>  /  TBili  0.8  /  DBili  x   /  AST  29  /  ALT  19  /  AlkPhos  144<H>  01-11    Urinalysis Basic - ( 11 Jan 2024 06:08 )    Color: x / Appearance: x / SG: x / pH: x  Gluc: 94 mg/dL / Ketone: x  / Bili: x / Urobili: x   Blood: x / Protein: x / Nitrite: x   Leuk Esterase: x / RBC: x / WBC x   Sq Epi: x / Non Sq Epi: x / Bacteria: x        RADIOLOGY & ADDITIONAL STUDIES: follow up on:  complex medical decision making related to goals of care    [facility] Geriatric and Palliative Consult Service:  Tamela Vasquez DO: cell ([phone])  Raf Rodas MD: cell ([phone])  Benito Osorio NP: cell ([phone])   Toni Rico LMSW: cell ([phone])   Sharon Rousseau NP: via [employer] Teams    Can contact any Palliative Team member via [employer] Teams for consults and questions      OVERNIGHT EVENTS:  None    Patient examined at bedside earlier this afternoon.  Remains alert and oriented x 1 to name only.  More awake, but also much more agitated.  Speech remains mostly incoherent.  Unable to answer if he is in pain, but clearly in pain and grimacing upon physical examination (see below).  No reports of SOB, nausea, or vomiting.  Continued poor oral intake per nursing staff.  No other acute issues reported.    Present Symptoms: Mild, Moderate, Severe  Pain:  Agitated, unable to verbalize, CPOT 4      Review of Systems:   Unable to obtain due to poor mentation/agitation/dementia    CPOT:  4  https://ccs-st.org/resources/Documents/Sedation%20Analgesia%20Delirium%20in%20CC/CCS%20STH%20Guideline%20template%20CPOT.pdf  PAIN AD Score:  5  http://geriatrictoolkit.[email or web address]/cog/[email or web address].pdf (press ctrl +  left click to view)    MEDICATIONS  (STANDING):  aspirin  chewable 81 milliGRAM(s) Oral daily  chlorhexidine 2% Cloths 1 Application(s) Topical <User Schedule>  donepezil 5 milliGRAM(s) Oral at bedtime  enoxaparin Injectable 40 milliGRAM(s) SubCutaneous every 24 hours  melatonin 3 milliGRAM(s) Oral at bedtime  mupirocin 2% Ointment 1 Application(s) Both Nostrils two times a day  tamsulosin 0.4 milliGRAM(s) Oral at bedtime    MEDICATIONS  (PRN):  LORazepam   Injectable 1 milliGRAM(s) IV Push every 6 hours PRN Agitation      PHYSICAL EXAM:  Vital Signs Last 24 Hrs  T(C): 36.8 (12 Jan 2024 21:48), Max: 36.8 (12 Jan 2024 21:48)  T(F): 98.3 (12 Jan 2024 21:48), Max: 98.3 (12 Jan 2024 21:48)  HR: 80 (12 Jan 2024 21:48) (73 - 83)  BP: 129/90 (12 Jan 2024 21:48) (111/49 - 129/90)  BP(mean): --  RR: 20 (12 Jan 2024 21:48) (18 - 20)  SpO2: 100% (12 Jan 2024 21:48) (94% - 100%)    Parameters below as of 12 Jan 2024 21:48  Patient On (Oxygen Delivery Method): nasal cannula  O2 Flow (L/min): 2      General: alert  oriented x _1___ to name only   more awake but agitated, + temporal wasting, in mild distress    Palliative Performance Scale/Karnofsky Score:  30%  http://[email or web address].org/files/news/palliative_performance_scale_[email or web address].pdf    HEENT:  EOMI anicteric, pharynx clear, MM sl dry  Lungs:  minimal resting tachypnea noted, otherwise clear to auscultation, no secretions noted  CV: RRR,  normal S1 and S2, no murmur  GI: soft non distended non tender  + bowel sounds  : incontinent    Musculoskeletal: weakness x4  in all extremities, essentially bedbound, ++ hard mass of R medial thigh, bilateral legs and thigh mass exquisitely tender to palpation, no edema noted  Skin: no abnormal skin lesions or DU noted  Neuro: no focal deficits, ++ severe cognitive impairment  + dysphagia   Oral intake ability:  minimal to moderate capability, on soft and bite sized diet      LABS:                          8.5    34.93 )-----------( 323      ( 11 Jan 2024 06:08 )             27.7     01-11    144  |  111<H>  |  29<H>  ----------------------------<  94  4.0   |  29  |  0.91    Ca    11.1<H>      11 Jan 2024 06:08  Phos  3.1     01-11  Mg     2.2     01-11    TPro  5.5<L>  /  Alb  1.2<L>  /  TBili  0.8  /  DBili  x   /  AST  29  /  ALT  19  /  AlkPhos  144<H>  01-11    Urinalysis Basic - ( 11 Jan 2024 06:08 )    Color: x / Appearance: x / SG: x / pH: x  Gluc: 94 mg/dL / Ketone: x  / Bili: x / Urobili: x   Blood: x / Protein: x / Nitrite: x   Leuk Esterase: x / RBC: x / WBC x   Sq Epi: x / Non Sq Epi: x / Bacteria: x        RADIOLOGY & ADDITIONAL STUDIES:

## 2024-01-12 NOTE — PROGRESS NOTE ADULT - NS ATTEND AMEND GEN_ALL_CORE FT
Patient seen at bedside, opens his eys but does not answer questions, appears to be not in acute distress.  On exam, patient is AOx0, cardiopulmonary exams unremarkable, abdomen soft, NT/ND.  No labs available for review today.       Assessment and plan:  83 y.o M w/ PMHx of BPH w/ urinary retention, stroke (2013), emphysema, afib not on AC, dementia, erectile dysfunction, vertigo, leukocytosis of unknown origin (currently being worked up at [facility] Dr. 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 Labs significant for elevated WBC. CTH shows no changes from CTH in 11/2023. Xray show no fractures. CXR shows scattered non-specific pulmonary infiltrates. Admitted for ambulatory dysfunction and malignancy workup. Further workup shows multiple nodules in the lungs suggestive of metastases. Oncology consulted, recommending biopsy for further guidance of treatment, but patient is not considered a candidate for conventional chemotherapy given the age, low functional status and comorbidities. Surgery recommends needle core biopsy as the risk of surgical biopsy is deemed to overweigh the benefit. Attempted MRI but unable to obtain the imaging even with ativan as patient became restless.    # 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    # Lung nodules suspicious of malignancy   # Enlarged prostate, concerning for malignancy   # Leukocytosis, likely due to malignancy, ruled out leukemia with BM biopsy    # Anemia   # Delirium, possible hospital onset delirium; unlikely infection, CT head neg, electrolytes stable- resolved   # Hypercalcemia of malignancy   # Renal complex mass   # Abnormal EKG, appearing  2/2 wondering atrial pacemaker   # Atrial fibrillation not on AC   # hx of CVA, cerebellar infarct    # Emphysema    # Severe protein calorie malnutrition     - heme/onc recs appreciated   - patient was not able to tolerate MRI of R thigh for further evaluation  - GOC discussion held with primary team, palliative care Dr. Rodas, and patient's wife and HCP Sadie 1/10, awaiting final decision from the HCP  - HCP would like to seek LTC with hospice, SW assistance appreciated  - DVT ppx: Lovenox. Patient seen at bedside, opens his eys but does not answer questions, appears to be not in acute distress.  On exam, patient is AOx0, cardiopulmonary exams unremarkable, abdomen soft, NT/ND.  No labs available for review today.       Assessment and plan:  83 y.o M w/ PMHx of BPH w/ urinary retention, stroke (2013), emphysema, afib not on AC, dementia, erectile dysfunction, vertigo, leukocytosis of unknown origin (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 Labs significant for elevated WBC. CTH shows no changes from CTH in 11/2023. Xray show no fractures. CXR shows scattered non-specific pulmonary infiltrates. Admitted for ambulatory dysfunction and malignancy workup. Further workup shows multiple nodules in the lungs suggestive of metastases. Oncology consulted, recommending biopsy for further guidance of treatment, but patient is not considered a candidate for conventional chemotherapy given the age, low functional status and comorbidities. Surgery recommends needle core biopsy as the risk of surgical biopsy is deemed to overweigh the benefit. Attempted MRI but unable to obtain the imaging even with ativan as patient became restless.    # 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    # Lung nodules suspicious of malignancy   # Enlarged prostate, concerning for malignancy   # Leukocytosis, likely due to malignancy, ruled out leukemia with BM biopsy    # Anemia   # Delirium, possible hospital onset delirium; unlikely infection, CT head neg, electrolytes stable- resolved   # Hypercalcemia of malignancy   # Renal complex mass   # Abnormal EKG, appearing  2/2 wondering atrial pacemaker   # Atrial fibrillation not on AC   # hx of CVA, cerebellar infarct    # Emphysema    # Severe protein calorie malnutrition     - heme/onc recs appreciated   - patient was not able to tolerate MRI of R thigh for further evaluation  - GOC discussion held with primary team, palliative care Dr. Rodas, and patient's wife and HCP Sadie 1/10, awaiting final decision from the HCP  - HCP would like to seek LTC with hospice, SW assistance appreciated  - DVT ppx: Lovenox.

## 2024-01-12 NOTE — PROGRESS NOTE ADULT - PROBLEM SELECTOR PLAN 4
See [facility] discussion above--patient would benefit from low dose PRN opioids, wife refusing at this time    For now recommend starting Tylenol 650 mg PRN pain  Low threshold for starting low dose IV morphine or Dilaudid--will follow closely See [facility] discussion above--patient would benefit from low dose PRN opioids, wife refusing at this time    For now recommend starting Tylenol 650 mg PRN pain  Low threshold for starting low dose IV morphine or Dilaudid--will follow closely See [facility] discussion above--patient would benefit from low dose PRN opioids, wife refusing at this time    For now recommend starting Tylenol 650 mg PRN pain  Low threshold for starting low dose IV morphine or Dilaudid--will follow closely

## 2024-01-12 NOTE — PROGRESS NOTE ADULT - PROBLEM SELECTOR PLAN 1
CT with worsening metastatic disease to lungs  Has R thigh mass concerning for sarcoma, also with concern for primary prostate Ca  [facility]; also discussed patient with Dr. Pugh previously by phone     Patient is currently ECOG 4 with poor functional status and worsening delirium/encephalopathy on top of presumed vascular dementia.  He is now essentially bedbound and has become increasingly obtunded.  He would likely now require IR biopsy of a lung nodule for tissue diagnosis (risks far outweigh benefits).  Even if a responsive tumor was found, I do not see how patient would be able to tolerate chemotherapy or any type of cancer directed treatment.  He is medically appropriate for hospice with likely prognosis of weeks to less than 2-3 months.  Now with worsening pain/agitation over last 48 hours, remains low threshold for inpatient hospice/IPU    See GOC discussion from 1/10 and above--wife unable to care for patient at home, wishes to consider LTC placement with hospice, exploring options, unable to make decisions about comfort care or code status at this time    Continue supportive care CT with worsening metastatic disease to lungs  Has R thigh mass concerning for sarcoma, also with concern for primary prostate Ca  [facility]; also discussed patient with Dr. Pugh previously by phone     Patient is currently ECOG 4 with poor functional status and worsening delirium/encephalopathy on top of presumed vascular dementia.  He is now essentially bedbound and has become increasingly obtunded.  He would likely now require IR biopsy of a lung nodule for tissue diagnosis (risks far outweigh benefits).  Even if a responsive tumor was found, I do not see how patient would be able to tolerate chemotherapy or any type of cancer directed treatment.  He is medically appropriate for hospice with likely prognosis of weeks to less than 2-3 months.  Now with worsening pain/agitation over last 48 hours, remains low threshold for inpatient hospice/IPU    See GOC discussion from 1/10 and above--wife unable to care for patient at home, wishes to consider LTC placement with hospice, exploring options, unable to make decisions about comfort care or code status at this time    Continue supportive care CT with worsening metastatic disease to lungs  Has R thigh mass concerning for sarcoma, also with concern for primary prostate Ca  [facility]; also discussed patient with Dr. Pugh previously by phone     Patient is currently ECOG 4 with poor functional status and worsening delirium/encephalopathy on top of presumed vascular dementia.  He is now essentially bedbound and has become increasingly obtunded.  He would likely now require IR biopsy of a lung nodule for tissue diagnosis (risks far outweigh benefits).  Even if a responsive tumor was found, I do not see how patient would be able to tolerate chemotherapy or any type of cancer directed treatment.  He is medically appropriate for hospice with likely prognosis of weeks to less than 2-3 months.  Now with worsening pain/agitation over last 48 hours, remains low threshold for inpatient hospice/IPU    See GOC discussion from 1/10 and above--wife unable to care for patient at home, wishes to consider LTC placement with hospice, exploring options, unable to make decisions about comfort care or code status at this time    Continue supportive care

## 2024-01-12 NOTE — PROGRESS NOTE ADULT - ASSESSMENT
83 y.o M w/ PMHx of BPH w/ urinary retention, stroke (2013), HTN, DM, afib not on A/C, dementia (on home Aricept), erectile dysfunction, vertigo, leukocytosis of unknown origin (currently followed by Dr. 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  Initial ER labs were notable for WBC of 47, albumin of 1.7, and hypercalcemia (corrected calcium of 13).  CXR showed scattered small pulmonary infiltrates, no fractures identified on additional x-ray imaging, CT of head was negative for acute intracranial infarct/hemorrhage/mass  (+ minimal anterior periventricular white matter ischemic changes).      Patient was admitted for ambulatory dysfunction, hypercalcemia, and malignancy work-up.  Heme/Onc consulted; per their notes patient with previous BM biopsy in Dec. 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  CT of chest/abd/pelvis (1/7) showed increasing metastatic disease burden to the lungs.  Surgical Oncology consulted, but patient no longer considered a candidate for excisional biopsy of leg mass due to worsening functional/mental status.  IR consulted for possible core biopsy of R thigh mass, but patient unable to tolerate MRI yesterday 1/9 despite sedation with IV Ativan, possible IR mass of lung biopsy now being considered.  Palliative Care service consulted for medical decision making and additional GOC discussion. 83 y.o M w/ PMHx of BPH w/ urinary retention, stroke (2013), HTN, DM, afib not on A/C, dementia (on home Aricept), erectile dysfunction, vertigo, leukocytosis of unknown origin (currently followed by Dr. 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  Initial ER labs were notable for WBC of 47, albumin of 1.7, and hypercalcemia (corrected calcium of 13).  CXR showed scattered small pulmonary infiltrates, no fractures identified on additional x-ray imaging, CT of head was negative for acute intracranial infarct/hemorrhage/mass  (+ minimal anterior periventricular white matter ischemic changes).      Patient was admitted for ambulatory dysfunction, hypercalcemia, and malignancy work-up.  Heme/Onc consulted; per their notes patient with previous BM biopsy in Dec. 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  CT of chest/abd/pelvis (1/7) showed increasing metastatic disease burden to the lungs.  Surgical Oncology consulted, but patient no longer considered a candidate for excisional biopsy of leg mass due to worsening functional/mental status.  IR consulted for possible core biopsy of R thigh mass, but patient unable to tolerate MRI yesterday 1/9 despite sedation with IV Ativan, possible IR mass of lung biopsy now being considered.  Palliative Care service consulted for medical decision making and additional GOC discussion. 83 y.o M w/ PMHx of BPH w/ urinary retention, stroke (2013), HTN, DM, afib not on A/C, dementia (on home Aricept), erectile dysfunction, vertigo, leukocytosis of unknown origin (currently followed by Dr. 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  Initial ER labs were notable for WBC of 47, albumin of 1.7, and hypercalcemia (corrected calcium of 13).  CXR showed scattered small pulmonary infiltrates, no fractures identified on additional x-ray imaging, CT of head was negative for acute intracranial infarct/hemorrhage/mass  (+ minimal anterior periventricular white matter ischemic changes).      Patient was admitted for ambulatory dysfunction, hypercalcemia, and malignancy work-up.  Heme/Onc consulted; per their notes patient with previous BM biopsy in Dec. 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  CT of chest/abd/pelvis (1/7) showed increasing metastatic disease burden to the lungs.  Surgical Oncology consulted, but patient no longer considered a candidate for excisional biopsy of leg mass due to worsening functional/mental status.  IR consulted for possible core biopsy of R thigh mass, but patient unable to tolerate MRI yesterday 1/9 despite sedation with IV Ativan, possible IR mass of lung biopsy now being considered.  Palliative Care service consulted for medical decision making and additional GOC discussion.

## 2024-01-12 NOTE — PROGRESS NOTE ADULT - PROBLEM SELECTOR PLAN 2
Now with superimposed delirium/metabolic encephalopathy, likely multi-factorial in the setting of metastatic cancer and hypercalcemia.  Patient is currently bedbound and total care, equivalent to FAST stage 7A.  PPS 30%.  Hospice appropriate, low threshold for IPU    Continue donepezil and supportive care.

## 2024-01-12 NOTE — PROGRESS NOTE ADULT - PROBLEM SELECTOR PLAN 1
Leukocytosis with negative outpatient leukemia workup  - CT Abd & Pelvis- right thigh mass  - CT Chest extensive progressive Metastatic burden to the lungs  - Sx/Onc consulted: not a candidate for excisional biopsy  - IR consulted for Core biopsy of thigh recs MRI first but pt unable to tolerate MRI today despite Ativan prior to test  - Palliative consulted -- F/U recs. Wife leaning towards LTC placement  - hem/onc following Leukocytosis with negative outpatient leukemia workup  - CT Abd & Pelvis- right thigh mass  - CT Chest extensive progressive Metastatic burden to the lungs  - Sx/Onc consulted: not a candidate for excisional biopsy  - IR consulted for Core biopsy of thigh recs MRI first but pt unable to tolerate MRI today despite Ativan prior to test  - Palliative consulted -- F/U recs. Wife leaning towards LTC placement with hospice  - hem/onc following

## 2024-01-12 NOTE — PROGRESS NOTE ADULT - PROBLEM SELECTOR PLAN 5
Poor prognostic sign in setting of advanced cancer  Continue management as per primary medical team.

## 2024-01-12 NOTE — PROGRESS NOTE ADULT - PROBLEM SELECTOR PLAN 3
WBC 47.92 on admission uptrended to 57  - WBC 34 today  - hem/onc following -- Bone marrow biopsy in November 2023 was unremarkable with no evidence of hematological malignancy. Likely reactive to ongoing malignancy or inflammation. WBC 47.92 on admission uptrended to 57  - WBC 34, no labs today  - hem/onc following -- Bone marrow biopsy in November 2023 was unremarkable with no evidence of hematological malignancy. Likely reactive to ongoing malignancy or inflammation.

## 2024-01-13 NOTE — PROGRESS NOTE ADULT - PROBLEM SELECTOR PLAN 3
-ECOG 4, known right upper thigh mass. Imaging also showed metastatic burden in the lungs  -?sarcoma vs prostate primary   -IR consulted for biposy, surgery unable  -poor performance status and significant metastatic burden, poor prognosis and trajectory and EOL options discussed with wife who is deliberating. LTC with hospice under consideration.  -wishes to continue with full code at this time

## 2024-01-13 NOTE — PROGRESS NOTE ADULT - SUBJECTIVE AND OBJECTIVE BOX
follow up on:  complex medical decision making related to goals of care    [facility] Geriatric and Palliative Consult Service:  Tamela Vasquez DO: cell ([phone])  Navi Rodas MD: cell ([phone])  Benito Osorio NP: cell [phone])   Toni Rico LMSW: cell ([phone])   Sharon Rousseau NP: via [employer] Teams    Can contact any Palliative Team member via [employer] Teams for consults and questions      OVERNIGHT EVENTS: Seen and examined at the bedside, A/Ox1-2, agitated, verbally responsive with confusion unable to participate in ROS.    Present Symptoms: Mild, Moderate, Severe  Pain:             Location -                               Aggravating factors -             Quality -             Radiation -             Timing-             Severity (0-10 scale):             Minimal acceptable level (0-10 scale):  Fatigue:  Nausea:  Lack of Appetite:   SOB:  Depression:  Anxiety:  Review of Systems:  Unable to obtain due to poor mentation    CPOT:  4  https://[email or web address]/Critical-Care-Pain-Observation-Tool-(CPOT)  PAIN AD Score:   [email or web address]/[email or web address] (press ctrl +  left click to view)MEDICATIONS  (STANDING):  aspirin  chewable 81 milliGRAM(s) Oral daily  chlorhexidine 2% Cloths 1 Application(s) Topical <User Schedule>  donepezil 5 milliGRAM(s) Oral at bedtime  enoxaparin Injectable 40 milliGRAM(s) SubCutaneous every 24 hours  melatonin 3 milliGRAM(s) Oral at bedtime  mupirocin 2% Ointment 1 Application(s) Both Nostrils two times a day  sodium chloride 0.9%. 1000 milliLiter(s) (100 mL/Hr) IV Continuous <Continuous>  tamsulosin 0.4 milliGRAM(s) Oral at bedtime    MEDICATIONS  (PRN):  LORazepam     Tablet 0.5 milliGRAM(s) Oral every 6 hours PRN Agitation  LORazepam   Injectable 1 milliGRAM(s) IV Push every 6 hours PRN Agitation  morphine  - Injectable 0.5 milliGRAM(s) IV Push every 4 hours PRN Severe Pain (7 - 10)      PHYSICAL EXAM:  Vital Signs Last 24 Hrs  T(C): 36.4 (2024 14:08), Max: 36.8 (2024 21:48)  T(F): 97.6 (2024 14:08), Max: 98.3 (2024 21:48)  HR: 78 (2024 14:08) (78 - 81)  BP: 109/50 (2024 14:08) (109/50 - 129/90)  BP(mean): --  RR: 18 (2024 14:08) (18 - 20)  SpO2: 99% (2024 14:08) (98% - 100%)    Parameters below as of 2024 14:08  Patient On (Oxygen Delivery Method): nasal cannula  O2 Flow (L/min): 2      General: alert  oriented x 1-2  lethargic cachexia agitated    Palliative Performance Scale/Karnofsky Score: 30%  http://npcrc.org/files/news/palliative_performance_scale_ppsv2.pdf  ECO    HEENT: no abnormal lesion, dry mouth, poor dentition  Lungs: nonlabor with 2L NC  CV: RRR, S1S2  GI: soft non distended non tender  incontinent  : incontinent    Musculoskeletal: weakness x4 no edema  mostly/fully bedbound  Skin: no abnormal skin lesions, poor skin turgor, scattered ecchymoses  Neuro: severe cognitive impairment   Oral intake ability: moderate full capability    LABS:                RADIOLOGY & ADDITIONAL STUDIES:     follow up on:  complex medical decision making related to goals of care    [facility] Geriatric and Palliative Consult Service:  Tamela Vasquez DO: cell ([phone])  Navi Rodas MD: cell ([phone])  Benito Osorio NP: cell ([phone])   Toni Rico LMSW: cell ([phone])   Sharon Rousseau NP: via [employer] Teams    Can contact any Palliative Team member via [employer] Teams for consults and questions      OVERNIGHT EVENTS: Seen and examined at the bedside, A/Ox1-2, agitated, verbally responsive with confusion unable to participate in ROS.    Present Symptoms: Mild, Moderate, Severe  Pain:             Location -                               Aggravating factors -             Quality -             Radiation -             Timing-             Severity (0-10 scale):             Minimal acceptable level (0-10 scale):  Fatigue:  Nausea:  Lack of Appetite:   SOB:  Depression:  Anxiety:  Review of Systems:  Unable to obtain due to poor mentation    CPOT:  4  https://[email or web address]/getattachment/[email or web address]/Critical-Care-Pain-Observation-Tool-(CPOT)  PAIN AD Score:   [email or web address].[email or web address].[email or web address]/cog/[email or web address] (press ctrl +  left click to view)MEDICATIONS  (STANDING):  aspirin  chewable 81 milliGRAM(s) Oral daily  chlorhexidine 2% Cloths 1 Application(s) Topical <User Schedule>  donepezil 5 milliGRAM(s) Oral at bedtime  enoxaparin Injectable 40 milliGRAM(s) SubCutaneous every 24 hours  melatonin 3 milliGRAM(s) Oral at bedtime  mupirocin 2% Ointment 1 Application(s) Both Nostrils two times a day  sodium chloride 0.9%. 1000 milliLiter(s) (100 mL/Hr) IV Continuous <Continuous>  tamsulosin 0.4 milliGRAM(s) Oral at bedtime    MEDICATIONS  (PRN):  LORazepam     Tablet 0.5 milliGRAM(s) Oral every 6 hours PRN Agitation  LORazepam   Injectable 1 milliGRAM(s) IV Push every 6 hours PRN Agitation  morphine  - Injectable 0.5 milliGRAM(s) IV Push every 4 hours PRN Severe Pain (7 - 10)      PHYSICAL EXAM:  Vital Signs Last 24 Hrs  T(C): 36.4 (2024 14:08), Max: 36.8 (2024 21:48)  T(F): 97.6 (2024 14:08), Max: 98.3 (2024 21:48)  HR: 78 (2024 14:08) (78 - 81)  BP: 109/50 (2024 14:08) (109/50 - 129/90)  BP(mean): --  RR: 18 (2024 14:08) (18 - 20)  SpO2: 99% (2024 14:08) (98% - 100%)    Parameters below as of 2024 14:08  Patient On (Oxygen Delivery Method): nasal cannula  O2 Flow (L/min): 2      General: alert  oriented x 1-2  lethargic cachexia agitated    Palliative Performance Scale/Karnofsky Score: 30%  http://npcrc.org/files/news/palliative_performance_scale_ppsv2.pdf  ECO    HEENT: no abnormal lesion, dry mouth, poor dentition  Lungs: nonlabor with 2L NC  CV: RRR, S1S2  GI: soft non distended non tender  incontinent  : incontinent    Musculoskeletal: weakness x4 no edema  mostly/fully bedbound  Skin: no abnormal skin lesions, poor skin turgor, scattered ecchymoses  Neuro: severe cognitive impairment   Oral intake ability: moderate full capability    LABS:                RADIOLOGY & ADDITIONAL STUDIES:

## 2024-01-13 NOTE — PROGRESS NOTE ADULT - PROBLEM SELECTOR PLAN 4
-c/w supplemental O2 PRN  -high risk respiratory failure and intubation given underlying emphysema and significant metastatic burden  -wife unable to make code status decision at this time, wishes to proceed as a full code    CT chest 1/7/24: IMPRESSION: Extensive progressive metastatic burden to the lungs. here is been interval   increase in size and number of numerous centrally necrotic lung masses measuring up to 3.7 cm in the left lower lobe compatible with metastatic disease. Mild right lower lobe dependent atelectasis.Trace bilateral layering pleural effusions, right greater than left.

## 2024-01-13 NOTE — PROGRESS NOTE ADULT - SUBJECTIVE AND OBJECTIVE BOX
[facility] Medicine  Patient is a 83y old  Male who presents with a chief complaint of frequent falls, weakness (13 Jan 2024 09:13)      SUBJECTIVE / OVERNIGHT EVENTS:  No acute events over night.   patient seen at bedside, non-verbal. no increased WOB. ROS unable to obtain    MEDICATIONS  (STANDING):  aspirin  chewable 81 milliGRAM(s) Oral daily  chlorhexidine 2% Cloths 1 Application(s) Topical <User Schedule>  donepezil 5 milliGRAM(s) Oral at bedtime  enoxaparin Injectable 40 milliGRAM(s) SubCutaneous every 24 hours  melatonin 3 milliGRAM(s) Oral at bedtime  mupirocin 2% Ointment 1 Application(s) Both Nostrils two times a day  sodium chloride 0.9%. 1000 milliLiter(s) (100 mL/Hr) IV Continuous <Continuous>  tamsulosin 0.4 milliGRAM(s) Oral at bedtime    MEDICATIONS  (PRN):  LORazepam   Injectable 1 milliGRAM(s) IV Push every 6 hours PRN Agitation          OBJECTIVE:  Vital Signs Last 24 Hrs  T(C): 36.4 (13 Jan 2024 14:08), Max: 36.8 (12 Jan 2024 21:48)  T(F): 97.6 (13 Jan 2024 14:08), Max: 98.3 (12 Jan 2024 21:48)  HR: 78 (13 Jan 2024 14:08) (78 - 83)  BP: 109/50 (13 Jan 2024 14:08) (109/50 - 129/90)  BP(mean): --  RR: 18 (13 Jan 2024 14:08) (18 - 20)  SpO2: 99% (13 Jan 2024 14:08) (94% - 100%)    Parameters below as of 13 Jan 2024 14:08  Patient On (Oxygen Delivery Method): nasal cannula  O2 Flow (L/min): 2      PHYSICAL EXAM:  GENERAL: non-verbal, opening eyes  HEAD:  Atraumatic, Normocephalic  EYES: conjunctiva and sclera clear  NECK: Supple, No JVD  CHEST/LUNG: Clear to auscultation bilaterally; No wheeze  HEART: Regular rate and rhythm; No murmurs, rubs, or gallops  ABDOMEN: Soft, Nontender, Nondistended; Bowel sounds present  EXTREMITIES:  No clubbing, cyanosis, or edema. Has R thigh mass  PSYCH: AAOx0  SKIN: No rashes or lesions      CAPILLARY BLOOD GLUCOSE        I&O's Summary            LABS:                        RADIOLOGY & ADDITIONAL TESTS:       [facility] Medicine  Patient is a 83y old  Male who presents with a chief complaint of frequent falls, weakness (13 Jan 2024 09:13)      SUBJECTIVE / OVERNIGHT EVENTS:  No acute events over night.   patient seen at bedside, non-verbal. no increased WOB. ROS unable to obtain    MEDICATIONS  (STANDING):  aspirin  chewable 81 milliGRAM(s) Oral daily  chlorhexidine 2% Cloths 1 Application(s) Topical <User Schedule>  donepezil 5 milliGRAM(s) Oral at bedtime  enoxaparin Injectable 40 milliGRAM(s) SubCutaneous every 24 hours  melatonin 3 milliGRAM(s) Oral at bedtime  mupirocin 2% Ointment 1 Application(s) Both Nostrils two times a day  sodium chloride 0.9%. 1000 milliLiter(s) (100 mL/Hr) IV Continuous <Continuous>  tamsulosin 0.4 milliGRAM(s) Oral at bedtime    MEDICATIONS  (PRN):  LORazepam   Injectable 1 milliGRAM(s) IV Push every 6 hours PRN Agitation          OBJECTIVE:  Vital Signs Last 24 Hrs  T(C): 36.4 (13 Jan 2024 14:08), Max: 36.8 (12 Jan 2024 21:48)  T(F): 97.6 (13 Jan 2024 14:08), Max: 98.3 (12 Jan 2024 21:48)  HR: 78 (13 Jan 2024 14:08) (78 - 83)  BP: 109/50 (13 Jan 2024 14:08) (109/50 - 129/90)  BP(mean): --  RR: 18 (13 Jan 2024 14:08) (18 - 20)  SpO2: 99% (13 Jan 2024 14:08) (94% - 100%)    Parameters below as of 13 Jan 2024 14:08  Patient On (Oxygen Delivery Method): nasal cannula  O2 Flow (L/min): 2      PHYSICAL EXAM:  GENERAL: non-verbal, opening eyes  HEAD:  Atraumatic, Normocephalic  EYES: conjunctiva and sclera clear  NECK: Supple, No JVD  CHEST/LUNG: Clear to auscultation bilaterally; No wheeze  HEART: Regular rate and rhythm; No murmurs, rubs, or gallops  ABDOMEN: Soft, Nontender, Nondistended; Bowel sounds present  EXTREMITIES:  No clubbing, cyanosis, or edema. Has R thigh mass  PSYCH: AAOx0  SKIN: No rashes or lesions      CAPILLARY BLOOD GLUCOSE        I&O's Summary            LABS:                        RADIOLOGY & ADDITIONAL TESTS:

## 2024-01-13 NOTE — PROGRESS NOTE ADULT - ASSESSMENT
83 y.o M w/ PMHx of BPH w/ urinary retention, stroke (2013), emphysema, afib not on AC, dementia, erectile dysfunction, vertigo, leukocytosis of unknown origin (currently being worked up at [facility] Dr. 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 Labs significant for elevated WBC. CTH shows no changes from CTH in 11/2023. Xray show no fractures. CXR shows scattered non-specific pulmonary infiltrates. Admitted for ambulatory dysfunction and malignancy workup. Further workup shows multiple nodules in the lungs suggestive of metastases. Oncology consulted, recommending biopsy for further guidance of treatment, but patient is not considered a candidate for conventional chemotherapy given the age, low functional status and comorbidities. Surgery recommends needle core biopsy as the risk of surgical biopsy is deemed to overweigh the benefit. Attempted MRI but unable to obtain the imaging even with ativan as patient became restless.    # 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    # Lung nodules suspicious of malignancy   # Enlarged prostate, concerning for malignancy   # Leukocytosis, likely due to malignancy, ruled out leukemia with BM biopsy    # Anemia   # Delirium, possible hospital onset delirium; unlikely infection, CT head neg, electrolytes stable- resolved   # Hypercalcemia of malignancy   # Renal complex mass   # Abnormal EKG, appearing  2/2 wondering atrial pacemaker   # Atrial fibrillation not on AC   # hx of CVA, cerebellar infarct    # Emphysema    # Severe protein calorie malnutrition     - heme/onc recs appreciated   - patient was not able to tolerate MRI of R thigh for further evaluation  - GOC discussion held with primary team, palliative care Dr. Rodas, and patient's wife and HCP Sadie 1/10  - HCP would like to seek LTC with hospice, SW assistance appreciated  - DVT ppx: Lovenox.   83 y.o M w/ PMHx of BPH w/ urinary retention, stroke (2013), emphysema, afib not on AC, dementia, erectile dysfunction, vertigo, leukocytosis of unknown origin (currently being worked up at [facility] Dr. 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 Labs significant for elevated WBC. CTH shows no changes from CTH in 11/2023. Xray show no fractures. CXR shows scattered non-specific pulmonary infiltrates. Admitted for ambulatory dysfunction and malignancy workup. Further workup shows multiple nodules in the lungs suggestive of metastases. Oncology consulted, recommending biopsy for further guidance of treatment, but patient is not considered a candidate for conventional chemotherapy given the age, low functional status and comorbidities. Surgery recommends needle core biopsy as the risk of surgical biopsy is deemed to overweigh the benefit. Attempted MRI but unable to obtain the imaging even with ativan as patient became restless.    # 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    # Lung nodules suspicious of malignancy   # Enlarged prostate, concerning for malignancy   # Leukocytosis, likely due to malignancy, ruled out leukemia with BM biopsy    # Anemia   # Delirium, possible hospital onset delirium; unlikely infection, CT head neg, electrolytes stable- resolved   # Hypercalcemia of malignancy   # Renal complex mass   # Abnormal EKG, appearing  2/2 wondering atrial pacemaker   # Atrial fibrillation not on AC   # hx of CVA, cerebellar infarct    # Emphysema    # Severe protein calorie malnutrition     - heme/onc recs appreciated   - patient was not able to tolerate MRI of R thigh for further evaluation  - GOC discussion held with primary team, palliative care Dr. Rodas, and patient's wife and HCP Sadie 1/10  - HCP would like to seek LTC with hospice, SW assistance appreciated  - DVT ppx: Lovenox.

## 2024-01-13 NOTE — PROGRESS NOTE ADULT - SUBJECTIVE AND OBJECTIVE BOX
DATE OF SERVICE: 01-13-24      No distress this am        aspirin  chewable 81 milliGRAM(s) Oral daily  chlorhexidine 2% Cloths 1 Application(s) Topical <User Schedule>  donepezil 5 milliGRAM(s) Oral at bedtime  enoxaparin Injectable 40 milliGRAM(s) SubCutaneous every 24 hours  LORazepam   Injectable 1 milliGRAM(s) IV Push every 6 hours PRN  melatonin 3 milliGRAM(s) Oral at bedtime  mupirocin 2% Ointment 1 Application(s) Both Nostrils two times a day  tamsulosin 0.4 milliGRAM(s) Oral at bedtime          Hemoglobin: 8.5 g/dL (01-11 @ 06:08)  Hemoglobin: 8.1 g/dL (01-10 @ 07:06)  Hemoglobin: 8.9 g/dL (01-09 @ 10:08)  Hemoglobin: 8.4 g/dL (01-09 @ 07:02)            Creatinine Trend: 0.91<--, 0.82<--, 0.90<--, 0.99<--, 0.81<--, 0.79<--    COAGS:           T(C): 36.4 (01-13-24 @ 05:23), Max: 36.8 (01-12-24 @ 21:48)  HR: 81 (01-13-24 @ 05:23) (80 - 83)  BP: 114/47 (01-13-24 @ 05:23) (113/48 - 129/90)  RR: 18 (01-13-24 @ 05:23) (18 - 20)  SpO2: 98% (01-13-24 @ 05:23) (94% - 100%)  Wt(kg): --    I&O's Summary    HEENT:  (-)icterus (-)pallor  CV: N S1 S2 1/6 DANNY (+)2 Pulses B/l  Resp:  Clear to ausculatation B/L, normal effort  GI: (+) BS Soft, NT, ND  Lymph:  (-)Edema, (-)obvious lymphadenopathy  Skin: Warm to touch, Normal turgor  Psych: Unable to adequately assess mood and affect    ASSESSMENT/PLAN: 	83y Male  PMHx of BPH w/ urinary retention, stroke (2013), HTN, DM, afib not on ac, dementia, erectile dysfunction, vertigo, leukocytosis of unknown origin (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    # Abnormal EKG  - Appear to have wondering atrial pacemaker  - tachy overnight, no complaints of palpitation   - ECHO noted , normal LV fx  - No need for further inpatient cardiac work up.    # Fall  - No evidence of LOC    # Leukocytosis  - Heme/onc f/u  - Surgery f/u noted      DATE OF SERVICE: 01-13-24      No distress this am        aspirin  chewable 81 milliGRAM(s) Oral daily  chlorhexidine 2% Cloths 1 Application(s) Topical <User Schedule>  donepezil 5 milliGRAM(s) Oral at bedtime  enoxaparin Injectable 40 milliGRAM(s) SubCutaneous every 24 hours  LORazepam   Injectable 1 milliGRAM(s) IV Push every 6 hours PRN  melatonin 3 milliGRAM(s) Oral at bedtime  mupirocin 2% Ointment 1 Application(s) Both Nostrils two times a day  tamsulosin 0.4 milliGRAM(s) Oral at bedtime          Hemoglobin: 8.5 g/dL (01-11 @ 06:08)  Hemoglobin: 8.1 g/dL (01-10 @ 07:06)  Hemoglobin: 8.9 g/dL (01-09 @ 10:08)  Hemoglobin: 8.4 g/dL (01-09 @ 07:02)            Creatinine Trend: 0.91<--, 0.82<--, 0.90<--, 0.99<--, 0.81<--, 0.79<--    COAGS:           T(C): 36.4 (01-13-24 @ 05:23), Max: 36.8 (01-12-24 @ 21:48)  HR: 81 (01-13-24 @ 05:23) (80 - 83)  BP: 114/47 (01-13-24 @ 05:23) (113/48 - 129/90)  RR: 18 (01-13-24 @ 05:23) (18 - 20)  SpO2: 98% (01-13-24 @ 05:23) (94% - 100%)  Wt(kg): --    I&O's Summary    HEENT:  (-)icterus (-)pallor  CV: N S1 S2 1/6 DANNY (+)2 Pulses B/l  Resp:  Clear to ausculatation B/L, normal effort  GI: (+) BS Soft, NT, ND  Lymph:  (-)Edema, (-)obvious lymphadenopathy  Skin: Warm to touch, Normal turgor  Psych: Unable to adequately assess mood and affect    ASSESSMENT/PLAN: 	83y Male  PMHx of BPH w/ urinary retention, stroke (2013), HTN, DM, afib not on ac, dementia, erectile dysfunction, vertigo, leukocytosis of unknown origin (currently being worked up at [facility] Dr. 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    # Abnormal EKG  - Appear to have wondering atrial pacemaker  - tachy overnight, no complaints of palpitation   - ECHO noted , normal LV fx  - No need for further inpatient cardiac work up.    # Fall  - No evidence of LOC    # Leukocytosis  - Heme/onc f/u  - Surgery f/u noted

## 2024-01-13 NOTE — PROGRESS NOTE ADULT - PROBLEM SELECTOR PLAN 5
-GOC as above, considering comfort care but unable to decide at this time. Agreeable to symptom management as above.  -remains full code despite multiple GOC and code status discussions.    Discussed with primary team.

## 2024-01-13 NOTE — PROGRESS NOTE ADULT - PROBLEM SELECTOR PLAN 1
-wife now agreeable to opioids, educated extensively regarding pain management in setting of metastatic disease  -start morphine 0.5 mg Q4h PRN severe pain -wife now agreeable to opioids, educated extensively regarding pain management in setting of metastatic disease  -start morphine 0.5 mg Q4h PRN severe pain  -start senna 2 tabs PO QHS PRN constipation  -start miralax 17 gm PO daily PRN constipation

## 2024-01-14 NOTE — PROGRESS NOTE ADULT - SUBJECTIVE AND OBJECTIVE BOX
DATE OF SERVICE: 01-14-24       No events overnight  no chest pain        aspirin  chewable 81 milliGRAM(s) Oral daily  chlorhexidine 2% Cloths 1 Application(s) Topical <User Schedule>  donepezil 5 milliGRAM(s) Oral at bedtime  enoxaparin Injectable 40 milliGRAM(s) SubCutaneous every 24 hours  LORazepam   Injectable 1 milliGRAM(s) IV Push every 6 hours PRN  melatonin 3 milliGRAM(s) Oral at bedtime  morphine  - Injectable 0.5 milliGRAM(s) IV Push every 4 hours PRN  mupirocin 2% Ointment 1 Application(s) Both Nostrils two times a day  polyethylene glycol 3350 17 Gram(s) Oral daily PRN  senna 2 Tablet(s) Oral at bedtime PRN  sodium chloride 0.9%. 1000 milliLiter(s) IV Continuous <Continuous>  tamsulosin 0.4 milliGRAM(s) Oral at bedtime          Hemoglobin: 8.5 g/dL (01-11 @ 06:08)  Hemoglobin: 8.1 g/dL (01-10 @ 07:06)  Hemoglobin: 8.9 g/dL (01-09 @ 10:08)            Creatinine Trend: 0.91<--, 0.82<--, 0.90<--, 0.99<--, 0.81<--, 0.79<--    COAGS:           T(C): 36.6 (01-14-24 @ 05:00), Max: 36.6 (01-14-24 @ 05:00)  HR: 51 (01-14-24 @ 05:00) (51 - 85)  BP: 104/84 (01-14-24 @ 05:00) (104/84 - 117/68)  RR: 19 (01-14-24 @ 05:00) (18 - 19)  SpO2: 95% (01-14-24 @ 05:00) (94% - 99%)  Wt(kg): --    I&O's Summary    HEENT:  (-)icterus (-)pallor  CV: N S1 S2 1/6 DANNY (+)2 Pulses B/l  Resp:  Clear to ausculatation B/L, normal effort  GI: (+) BS Soft, NT, ND  Lymph:  (-)Edema, (-)obvious lymphadenopathy  Skin: Warm to touch, Normal turgor  Psych: Unable to adequately assess mood and affect    ASSESSMENT/PLAN: 	83y Male  PMHx of BPH w/ urinary retention, stroke (2013), HTN, DM, afib not on ac, dementia, erectile dysfunction, vertigo, leukocytosis of unknown origin (currently being worked up at [facility] Dr. 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    # Abnormal EKG  - Appear to have wondering atrial pacemaker  - tachy overnight, no complaints of palpitation   - ECHO noted , normal LV fx  - No need for further inpatient cardiac work up.    # Fall  - No evidence of LOC    # Leukocytosis  - Heme/onc f/u  - Surgery f/u noted      DATE OF SERVICE: 01-14-24       No events overnight  no chest pain        aspirin  chewable 81 milliGRAM(s) Oral daily  chlorhexidine 2% Cloths 1 Application(s) Topical <User Schedule>  donepezil 5 milliGRAM(s) Oral at bedtime  enoxaparin Injectable 40 milliGRAM(s) SubCutaneous every 24 hours  LORazepam   Injectable 1 milliGRAM(s) IV Push every 6 hours PRN  melatonin 3 milliGRAM(s) Oral at bedtime  morphine  - Injectable 0.5 milliGRAM(s) IV Push every 4 hours PRN  mupirocin 2% Ointment 1 Application(s) Both Nostrils two times a day  polyethylene glycol 3350 17 Gram(s) Oral daily PRN  senna 2 Tablet(s) Oral at bedtime PRN  sodium chloride 0.9%. 1000 milliLiter(s) IV Continuous <Continuous>  tamsulosin 0.4 milliGRAM(s) Oral at bedtime          Hemoglobin: 8.5 g/dL (01-11 @ 06:08)  Hemoglobin: 8.1 g/dL (01-10 @ 07:06)  Hemoglobin: 8.9 g/dL (01-09 @ 10:08)            Creatinine Trend: 0.91<--, 0.82<--, 0.90<--, 0.99<--, 0.81<--, 0.79<--    COAGS:           T(C): 36.6 (01-14-24 @ 05:00), Max: 36.6 (01-14-24 @ 05:00)  HR: 51 (01-14-24 @ 05:00) (51 - 85)  BP: 104/84 (01-14-24 @ 05:00) (104/84 - 117/68)  RR: 19 (01-14-24 @ 05:00) (18 - 19)  SpO2: 95% (01-14-24 @ 05:00) (94% - 99%)  Wt(kg): --    I&O's Summary    HEENT:  (-)icterus (-)pallor  CV: N S1 S2 1/6 DANNY (+)2 Pulses B/l  Resp:  Clear to ausculatation B/L, normal effort  GI: (+) BS Soft, NT, ND  Lymph:  (-)Edema, (-)obvious lymphadenopathy  Skin: Warm to touch, Normal turgor  Psych: Unable to adequately assess mood and affect    ASSESSMENT/PLAN: 	83y Male  PMHx of BPH w/ urinary retention, stroke (2013), HTN, DM, afib not on ac, dementia, erectile dysfunction, vertigo, leukocytosis of unknown origin (currently being worked up at [facility] Dr. 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    # Abnormal EKG  - Appear to have wondering atrial pacemaker  - tachy overnight, no complaints of palpitation   - ECHO noted , normal LV fx  - No need for further inpatient cardiac work up.    # Fall  - No evidence of LOC    # Leukocytosis  - Heme/onc f/u  - Surgery f/u noted

## 2024-01-14 NOTE — PROGRESS NOTE ADULT - SUBJECTIVE AND OBJECTIVE BOX
[facility] Medicine  Patient is a 83y old  Male who presents with a chief complaint of frequent falls, weakness (14 Jan 2024 07:47)      SUBJECTIVE / OVERNIGHT EVENTS:  No acute events over night.   Patient opens eyes on verbal stimuli but is nonverbal, ROS unable to obtain.    MEDICATIONS  (STANDING):  aspirin  chewable 81 milliGRAM(s) Oral daily  chlorhexidine 2% Cloths 1 Application(s) Topical <User Schedule>  donepezil 5 milliGRAM(s) Oral at bedtime  enoxaparin Injectable 40 milliGRAM(s) SubCutaneous every 24 hours  melatonin 3 milliGRAM(s) Oral at bedtime  mupirocin 2% Ointment 1 Application(s) Both Nostrils two times a day  sodium chloride 0.9%. 1000 milliLiter(s) (100 mL/Hr) IV Continuous <Continuous>  tamsulosin 0.4 milliGRAM(s) Oral at bedtime    MEDICATIONS  (PRN):  LORazepam   Injectable 1 milliGRAM(s) IV Push every 6 hours PRN Agitation  morphine  - Injectable 0.5 milliGRAM(s) IV Push every 4 hours PRN Severe Pain (7 - 10)  polyethylene glycol 3350 17 Gram(s) Oral daily PRN Constipation  senna 2 Tablet(s) Oral at bedtime PRN Constipation          OBJECTIVE:  Vital Signs Last 24 Hrs  T(C): 36.6 (14 Jan 2024 05:00), Max: 36.6 (14 Jan 2024 05:00)  T(F): 97.8 (14 Jan 2024 05:00), Max: 97.8 (14 Jan 2024 05:00)  HR: 51 (14 Jan 2024 05:00) (51 - 85)  BP: 104/84 (14 Jan 2024 05:00) (104/84 - 117/68)  BP(mean): --  RR: 19 (14 Jan 2024 05:00) (18 - 19)  SpO2: 95% (14 Jan 2024 05:00) (94% - 99%)    Parameters below as of 14 Jan 2024 05:00  Patient On (Oxygen Delivery Method): nasal cannula  O2 Flow (L/min): 2      PHYSICAL EXAM:  GENERAL: NAD, well-developed  HEAD:  Atraumatic, Normocephalic  EYES: conjunctiva and sclera clear  NECK: Supple, No JVD  CHEST/LUNG: Clear to auscultation bilaterally; No wheeze  HEART: Regular rate and rhythm; No murmurs, rubs, or gallops  ABDOMEN: Soft, Nontender, Nondistended; Bowel sounds present  EXTREMITIES:  No clubbing, cyanosis, or edema  PSYCH: AAOx3  NEUROLOGY: non-focal  SKIN: No rashes or lesions      CAPILLARY BLOOD GLUCOSE        I&O's Summary            LABS:                        RADIOLOGY & ADDITIONAL TESTS:       [facility] Medicine  Patient is a 83y old  Male who presents with a chief complaint of frequent falls, weakness (14 Jan 2024 07:47)      SUBJECTIVE / OVERNIGHT EVENTS:  No acute events over night.   Patient opens eyes on verbal stimuli but is nonverbal, ROS unable to obtain.    MEDICATIONS  (STANDING):  aspirin  chewable 81 milliGRAM(s) Oral daily  chlorhexidine 2% Cloths 1 Application(s) Topical <User Schedule>  donepezil 5 milliGRAM(s) Oral at bedtime  enoxaparin Injectable 40 milliGRAM(s) SubCutaneous every 24 hours  melatonin 3 milliGRAM(s) Oral at bedtime  mupirocin 2% Ointment 1 Application(s) Both Nostrils two times a day  sodium chloride 0.9%. 1000 milliLiter(s) (100 mL/Hr) IV Continuous <Continuous>  tamsulosin 0.4 milliGRAM(s) Oral at bedtime    MEDICATIONS  (PRN):  LORazepam   Injectable 1 milliGRAM(s) IV Push every 6 hours PRN Agitation  morphine  - Injectable 0.5 milliGRAM(s) IV Push every 4 hours PRN Severe Pain (7 - 10)  polyethylene glycol 3350 17 Gram(s) Oral daily PRN Constipation  senna 2 Tablet(s) Oral at bedtime PRN Constipation          OBJECTIVE:  Vital Signs Last 24 Hrs  T(C): 36.6 (14 Jan 2024 05:00), Max: 36.6 (14 Jan 2024 05:00)  T(F): 97.8 (14 Jan 2024 05:00), Max: 97.8 (14 Jan 2024 05:00)  HR: 51 (14 Jan 2024 05:00) (51 - 85)  BP: 104/84 (14 Jan 2024 05:00) (104/84 - 117/68)  BP(mean): --  RR: 19 (14 Jan 2024 05:00) (18 - 19)  SpO2: 95% (14 Jan 2024 05:00) (94% - 99%)    Parameters below as of 14 Jan 2024 05:00  Patient On (Oxygen Delivery Method): nasal cannula  O2 Flow (L/min): 2      PHYSICAL EXAM:  GENERAL: NAD, well-developed  HEAD:  Atraumatic, Normocephalic  EYES: conjunctiva and sclera clear  NECK: Supple, No JVD  CHEST/LUNG: Clear to auscultation bilaterally; No wheeze  HEART: Regular rate and rhythm; No murmurs, rubs, or gallops  ABDOMEN: Soft, Nontender, Nondistended; Bowel sounds present  EXTREMITIES:  No clubbing, cyanosis, or edema  PSYCH: AAOx3  NEUROLOGY: non-focal  SKIN: No rashes or lesions      CAPILLARY BLOOD GLUCOSE        I&O's Summary            LABS:                        RADIOLOGY & ADDITIONAL TESTS:

## 2024-01-14 NOTE — PROGRESS NOTE ADULT - ASSESSMENT
83 y.o M w/ PMHx of BPH w/ urinary retention, stroke (2013), emphysema, afib not on AC, dementia, erectile dysfunction, vertigo, leukocytosis of unknown origin (currently being worked up at [facility] Dr. 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 Labs significant for elevated WBC. CTH shows no changes from CTH in 11/2023. Xray show no fractures. CXR shows scattered non-specific pulmonary infiltrates. Admitted for ambulatory dysfunction and malignancy workup. Further workup shows multiple nodules in the lungs suggestive of metastases. Oncology consulted, recommending biopsy for further guidance of treatment, but patient is not considered a candidate for conventional chemotherapy given the age, low functional status and comorbidities. Surgery recommends needle core biopsy as the risk of surgical biopsy is deemed to overweigh the benefit. Attempted MRI but unable to obtain the imaging even with ativan as patient became restless.    # 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    # Lung nodules suspicious of malignancy   # Enlarged prostate, concerning for malignancy   # Leukocytosis, likely due to malignancy, ruled out leukemia with BM biopsy    # Anemia   # Delirium, possible hospital onset delirium; unlikely infection, CT head neg, electrolytes stable- resolved   # Hypercalcemia of malignancy   # Renal complex mass   # Abnormal EKG, appearing  2/2 wondering atrial pacemaker   # Atrial fibrillation not on AC   # hx of CVA, cerebellar infarct    # Emphysema    # Severe protein calorie malnutrition     - heme/onc recs appreciated   - patient was not able to tolerate MRI of R thigh for further evaluation  - GOC discussion held with primary team, palliative care  - pall care further assistance appreciated, HCP desires full code for now  - HCP would like to seek LTC with hospice, SW assistance appreciated  - DVT ppx: Lovenox.     83 y.o M w/ PMHx of BPH w/ urinary retention, stroke (2013), emphysema, afib not on AC, dementia, erectile dysfunction, vertigo, leukocytosis of unknown origin (currently being worked up at [facility] Dr. 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 Labs significant for elevated WBC. CTH shows no changes from CTH in 11/2023. Xray show no fractures. CXR shows scattered non-specific pulmonary infiltrates. Admitted for ambulatory dysfunction and malignancy workup. Further workup shows multiple nodules in the lungs suggestive of metastases. Oncology consulted, recommending biopsy for further guidance of treatment, but patient is not considered a candidate for conventional chemotherapy given the age, low functional status and comorbidities. Surgery recommends needle core biopsy as the risk of surgical biopsy is deemed to overweigh the benefit. Attempted MRI but unable to obtain the imaging even with ativan as patient became restless.    # 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    # Lung nodules suspicious of malignancy   # Enlarged prostate, concerning for malignancy   # Leukocytosis, likely due to malignancy, ruled out leukemia with BM biopsy    # Anemia   # Delirium, possible hospital onset delirium; unlikely infection, CT head neg, electrolytes stable- resolved   # Hypercalcemia of malignancy   # Renal complex mass   # Abnormal EKG, appearing  2/2 wondering atrial pacemaker   # Atrial fibrillation not on AC   # hx of CVA, cerebellar infarct    # Emphysema    # Severe protein calorie malnutrition     - heme/onc recs appreciated   - patient was not able to tolerate MRI of R thigh for further evaluation  - GOC discussion held with primary team, palliative care  - pall care further assistance appreciated, HCP desires full code for now  - HCP would like to seek LTC with hospice, SW assistance appreciated  - DVT ppx: Lovenox.

## 2024-01-15 NOTE — PROGRESS NOTE ADULT - SUBJECTIVE AND OBJECTIVE BOX
DATE OF SERVICE: 01-15-24    resting comfortable, offers no complaints    aspirin  chewable 81 milliGRAM(s) Oral daily  chlorhexidine 2% Cloths 1 Application(s) Topical <User Schedule>  donepezil 5 milliGRAM(s) Oral at bedtime  enoxaparin Injectable 40 milliGRAM(s) SubCutaneous every 24 hours  LORazepam   Injectable 1 milliGRAM(s) IV Push every 6 hours PRN  melatonin 3 milliGRAM(s) Oral at bedtime  morphine  - Injectable 0.5 milliGRAM(s) IV Push every 4 hours PRN  polyethylene glycol 3350 17 Gram(s) Oral daily PRN  senna 2 Tablet(s) Oral at bedtime PRN  sodium chloride 0.9%. 1000 milliLiter(s) IV Continuous <Continuous>  tamsulosin 0.4 milliGRAM(s) Oral at bedtime                            10.7   54.36 )-----------( 359      ( 15 Rich 2024 08:00 )             38.3       Hemoglobin: 10.7 g/dL (01-15 @ 08:00)  Hemoglobin: 8.5 g/dL (01-11 @ 06:08)      01-15    151<H>  |  122<H>  |  31<H>  ----------------------------<  56<L>  4.3   |  18<L>  |  0.92    Ca    11.6<H>      15 Rich 2024 08:00    TPro  6.5  /  Alb  1.5<L>  /  TBili  0.8  /  DBili  x   /  AST  27  /  ALT  20  /  AlkPhos  219<H>  01-15    Creatinine Trend: 0.92<--, 0.91<--, 0.82<--, 0.90<--, 0.99<--, 0.81<--    COAGS:           T(C): 36.3 (01-15-24 @ 05:00), Max: 36.4 (01-14-24 @ 14:50)  HR: 77 (01-15-24 @ 05:00) (70 - 81)  BP: 120/46 (01-15-24 @ 05:00) (106/44 - 140/63)  RR: 18 (01-15-24 @ 05:00) (16 - 18)  SpO2: 94% (01-15-24 @ 05:00) (93% - 96%)  Wt(kg): --    I&O's Summary    HEENT:  (-)icterus (-)pallor  CV: N S1 S2 1/6 DANNY (+)2 Pulses B/l  Resp:  Clear to ausculatation B/L, normal effort  GI: (+) BS Soft, NT, ND  Lymph:  (-)Edema, (-)obvious lymphadenopathy  Skin: Warm to touch, Normal turgor  Psych: Unable to adequately assess mood and affect    ASSESSMENT/PLAN: 	83y Male  PMHx of BPH w/ urinary retention, stroke (2013), HTN, DM, afib not on ac, dementia, erectile dysfunction, vertigo, leukocytosis of unknown origin (currently being worked up at [facility] Dr. 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    # Abnormal EKG  - Appear to have wondering atrial pacemaker  - tachy overnight, no complaints of palpitation   - ECHO noted , normal LV fx  - No need for further inpatient cardiac work up.    # Fall  - No evidence of LOC    # Leukocytosis  - Heme/onc f/u  - Surgery f/u noted    Fredi Wilson MD, [facility]  BEEPER [phone]     DATE OF SERVICE: 01-15-24    resting comfortable, offers no complaints    aspirin  chewable 81 milliGRAM(s) Oral daily  chlorhexidine 2% Cloths 1 Application(s) Topical <User Schedule>  donepezil 5 milliGRAM(s) Oral at bedtime  enoxaparin Injectable 40 milliGRAM(s) SubCutaneous every 24 hours  LORazepam   Injectable 1 milliGRAM(s) IV Push every 6 hours PRN  melatonin 3 milliGRAM(s) Oral at bedtime  morphine  - Injectable 0.5 milliGRAM(s) IV Push every 4 hours PRN  polyethylene glycol 3350 17 Gram(s) Oral daily PRN  senna 2 Tablet(s) Oral at bedtime PRN  sodium chloride 0.9%. 1000 milliLiter(s) IV Continuous <Continuous>  tamsulosin 0.4 milliGRAM(s) Oral at bedtime                            10.7   54.36 )-----------( 359      ( 15 Rich 2024 08:00 )             38.3       Hemoglobin: 10.7 g/dL (01-15 @ 08:00)  Hemoglobin: 8.5 g/dL (01-11 @ 06:08)      01-15    151<H>  |  122<H>  |  31<H>  ----------------------------<  56<L>  4.3   |  18<L>  |  0.92    Ca    11.6<H>      15 Rich 2024 08:00    TPro  6.5  /  Alb  1.5<L>  /  TBili  0.8  /  DBili  x   /  AST  27  /  ALT  20  /  AlkPhos  219<H>  01-15    Creatinine Trend: 0.92<--, 0.91<--, 0.82<--, 0.90<--, 0.99<--, 0.81<--    COAGS:           T(C): 36.3 (01-15-24 @ 05:00), Max: 36.4 (01-14-24 @ 14:50)  HR: 77 (01-15-24 @ 05:00) (70 - 81)  BP: 120/46 (01-15-24 @ 05:00) (106/44 - 140/63)  RR: 18 (01-15-24 @ 05:00) (16 - 18)  SpO2: 94% (01-15-24 @ 05:00) (93% - 96%)  Wt(kg): --    I&O's Summary    HEENT:  (-)icterus (-)pallor  CV: N S1 S2 1/6 DANNY (+)2 Pulses B/l  Resp:  Clear to ausculatation B/L, normal effort  GI: (+) BS Soft, NT, ND  Lymph:  (-)Edema, (-)obvious lymphadenopathy  Skin: Warm to touch, Normal turgor  Psych: Unable to adequately assess mood and affect    ASSESSMENT/PLAN: 	83y Male  PMHx of BPH w/ urinary retention, stroke (2013), HTN, DM, afib not on ac, dementia, erectile dysfunction, vertigo, leukocytosis of unknown origin (currently being worked up at [facility] Dr. 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    # Abnormal EKG  - Appear to have wondering atrial pacemaker  - tachy overnight, no complaints of palpitation   - ECHO noted , normal LV fx  - No need for further inpatient cardiac work up.    # Fall  - No evidence of LOC    # Leukocytosis  - Heme/onc f/u  - Surgery f/u noted    Fredi Wilson MD, [facility]  BEEPER [phone]     DATE OF SERVICE: 01-15-24    resting comfortable, offers no complaints    aspirin  chewable 81 milliGRAM(s) Oral daily  chlorhexidine 2% Cloths 1 Application(s) Topical <User Schedule>  donepezil 5 milliGRAM(s) Oral at bedtime  enoxaparin Injectable 40 milliGRAM(s) SubCutaneous every 24 hours  LORazepam   Injectable 1 milliGRAM(s) IV Push every 6 hours PRN  melatonin 3 milliGRAM(s) Oral at bedtime  morphine  - Injectable 0.5 milliGRAM(s) IV Push every 4 hours PRN  polyethylene glycol 3350 17 Gram(s) Oral daily PRN  senna 2 Tablet(s) Oral at bedtime PRN  sodium chloride 0.9%. 1000 milliLiter(s) IV Continuous <Continuous>  tamsulosin 0.4 milliGRAM(s) Oral at bedtime                            10.7   54.36 )-----------( 359      ( 15 Rich 2024 08:00 )             38.3       Hemoglobin: 10.7 g/dL (01-15 @ 08:00)  Hemoglobin: 8.5 g/dL (01-11 @ 06:08)      01-15    151<H>  |  122<H>  |  31<H>  ----------------------------<  56<L>  4.3   |  18<L>  |  0.92    Ca    11.6<H>      15 Rich 2024 08:00    TPro  6.5  /  Alb  1.5<L>  /  TBili  0.8  /  DBili  x   /  AST  27  /  ALT  20  /  AlkPhos  219<H>  01-15    Creatinine Trend: 0.92<--, 0.91<--, 0.82<--, 0.90<--, 0.99<--, 0.81<--    COAGS:           T(C): 36.3 (01-15-24 @ 05:00), Max: 36.4 (01-14-24 @ 14:50)  HR: 77 (01-15-24 @ 05:00) (70 - 81)  BP: 120/46 (01-15-24 @ 05:00) (106/44 - 140/63)  RR: 18 (01-15-24 @ 05:00) (16 - 18)  SpO2: 94% (01-15-24 @ 05:00) (93% - 96%)  Wt(kg): --    I&O's Summary    HEENT:  (-)icterus (-)pallor  CV: N S1 S2 1/6 DANNY (+)2 Pulses B/l  Resp:  Clear to ausculatation B/L, normal effort  GI: (+) BS Soft, NT, ND  Lymph:  (-)Edema, (-)obvious lymphadenopathy  Skin: Warm to touch, Normal turgor  Psych: Unable to adequately assess mood and affect    ASSESSMENT/PLAN: 	83y Male  PMHx of BPH w/ urinary retention, stroke (2013), HTN, DM, afib not on ac, dementia, erectile dysfunction, vertigo, leukocytosis of unknown origin (currently being worked up at [facility] Dr. 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    # Abnormal EKG  - Appear to have wondering atrial pacemaker  - tachy overnight, no complaints of palpitation   - ECHO noted , normal LV fx  - No need for further inpatient cardiac work up.    # Fall  - No evidence of LOC    # Leukocytosis  - Heme/onc f/u  - Surgery f/u noted    Fredi Wilson MD, [facility]  BEEPER [phone]

## 2024-01-15 NOTE — PROGRESS NOTE ADULT - SUBJECTIVE AND OBJECTIVE BOX
[facility] Medicine  Patient is a 83y old  Male who presents with a chief complaint of frequent falls, weakness (15 Rich 2024 12:52)      SUBJECTIVE / OVERNIGHT EVENTS:  No acute events over night.  patient is seen at bedside, nonverbal, closing eyes but reactive to verbal stimuli. ROS unable to obtain.    MEDICATIONS  (STANDING):  aspirin  chewable 81 milliGRAM(s) Oral daily  chlorhexidine 2% Cloths 1 Application(s) Topical <User Schedule>  donepezil 5 milliGRAM(s) Oral at bedtime  enoxaparin Injectable 40 milliGRAM(s) SubCutaneous every 24 hours  melatonin 3 milliGRAM(s) Oral at bedtime  sodium chloride 0.9%. 1000 milliLiter(s) (100 mL/Hr) IV Continuous <Continuous>  tamsulosin 0.4 milliGRAM(s) Oral at bedtime    MEDICATIONS  (PRN):  LORazepam   Injectable 1 milliGRAM(s) IV Push every 6 hours PRN Agitation  morphine  - Injectable 0.5 milliGRAM(s) IV Push every 4 hours PRN Severe Pain (7 - 10)  polyethylene glycol 3350 17 Gram(s) Oral daily PRN Constipation  senna 2 Tablet(s) Oral at bedtime PRN Constipation          OBJECTIVE:  Vital Signs Last 24 Hrs  T(C): 36.3 (15 Rich 2024 05:00), Max: 36.4 (14 Jan 2024 14:50)  T(F): 97.4 (15 Rich 2024 05:00), Max: 97.6 (14 Jan 2024 14:50)  HR: 77 (15 Rich 2024 05:00) (70 - 81)  BP: 120/46 (15 Rich 2024 05:00) (106/44 - 140/63)  BP(mean): --  RR: 18 (15 Rich 2024 05:00) (16 - 18)  SpO2: 94% (15 Rich 2024 05:00) (93% - 96%)    Parameters below as of 15 Rich 2024 05:00  Patient On (Oxygen Delivery Method): room air        PHYSICAL EXAM:  GENERAL: somnolent, opens eyes to verbal stimuli  HEAD:  Atraumatic, Normocephalic  EYES: conjunctiva and sclera clear  NECK: Supple, No JVD  CHEST/LUNG: Clear to auscultation bilaterally; No wheeze  HEART: Regular rate and rhythm; No murmurs, rubs, or gallops  ABDOMEN: Soft, Nontender, Nondistended; Bowel sounds present  EXTREMITIES:  No clubbing, cyanosis, or edema. Has R thigh mass   PSYCH: AAOx0  NEUROLOGY: non-focal  SKIN: No rashes or lesions      CAPILLARY BLOOD GLUCOSE        I&O's Summary            LABS:                        10.7   54.36 )-----------( 359      ( 15 Rich 2024 08:00 )             38.3     01-15    151<H>  |  122<H>  |  31<H>  ----------------------------<  56<L>  4.3   |  18<L>  |  0.92    Ca    11.6<H>      15 Rich 2024 08:00    TPro  6.5  /  Alb  1.5<L>  /  TBili  0.8  /  DBili  x   /  AST  27  /  ALT  20  /  AlkPhos  219<H>  01-15          Urinalysis Basic - ( 15 Rich 2024 08:00 )    Color: x / Appearance: x / SG: x / pH: x  Gluc: 56 mg/dL / Ketone: x  / Bili: x / Urobili: x   Blood: x / Protein: x / Nitrite: x   Leuk Esterase: x / RBC: x / WBC x   Sq Epi: x / Non Sq Epi: x / Bacteria: x            RADIOLOGY & ADDITIONAL TESTS:       [facility] Medicine  Patient is a 83y old  Male who presents with a chief complaint of frequent falls, weakness (15 Rich 2024 12:52)      SUBJECTIVE / OVERNIGHT EVENTS:  No acute events over night.  patient is seen at bedside, nonverbal, closing eyes but reactive to verbal stimuli. ROS unable to obtain.    MEDICATIONS  (STANDING):  aspirin  chewable 81 milliGRAM(s) Oral daily  chlorhexidine 2% Cloths 1 Application(s) Topical <User Schedule>  donepezil 5 milliGRAM(s) Oral at bedtime  enoxaparin Injectable 40 milliGRAM(s) SubCutaneous every 24 hours  melatonin 3 milliGRAM(s) Oral at bedtime  sodium chloride 0.9%. 1000 milliLiter(s) (100 mL/Hr) IV Continuous <Continuous>  tamsulosin 0.4 milliGRAM(s) Oral at bedtime    MEDICATIONS  (PRN):  LORazepam   Injectable 1 milliGRAM(s) IV Push every 6 hours PRN Agitation  morphine  - Injectable 0.5 milliGRAM(s) IV Push every 4 hours PRN Severe Pain (7 - 10)  polyethylene glycol 3350 17 Gram(s) Oral daily PRN Constipation  senna 2 Tablet(s) Oral at bedtime PRN Constipation          OBJECTIVE:  Vital Signs Last 24 Hrs  T(C): 36.3 (15 Rich 2024 05:00), Max: 36.4 (14 Jan 2024 14:50)  T(F): 97.4 (15 Rich 2024 05:00), Max: 97.6 (14 Jan 2024 14:50)  HR: 77 (15 Rich 2024 05:00) (70 - 81)  BP: 120/46 (15 Rich 2024 05:00) (106/44 - 140/63)  BP(mean): --  RR: 18 (15 Rich 2024 05:00) (16 - 18)  SpO2: 94% (15 Rich 2024 05:00) (93% - 96%)    Parameters below as of 15 Rich 2024 05:00  Patient On (Oxygen Delivery Method): room air        PHYSICAL EXAM:  GENERAL: somnolent, opens eyes to verbal stimuli  HEAD:  Atraumatic, Normocephalic  EYES: conjunctiva and sclera clear  NECK: Supple, No JVD  CHEST/LUNG: Clear to auscultation bilaterally; No wheeze  HEART: Regular rate and rhythm; No murmurs, rubs, or gallops  ABDOMEN: Soft, Nontender, Nondistended; Bowel sounds present  EXTREMITIES:  No clubbing, cyanosis, or edema. Has R thigh mass   PSYCH: AAOx0  NEUROLOGY: non-focal  SKIN: No rashes or lesions      CAPILLARY BLOOD GLUCOSE        I&O's Summary            LABS:                        10.7   54.36 )-----------( 359      ( 15 Rich 2024 08:00 )             38.3     01-15    151<H>  |  122<H>  |  31<H>  ----------------------------<  56<L>  4.3   |  18<L>  |  0.92    Ca    11.6<H>      15 Rich 2024 08:00    TPro  6.5  /  Alb  1.5<L>  /  TBili  0.8  /  DBili  x   /  AST  27  /  ALT  20  /  AlkPhos  219<H>  01-15          Urinalysis Basic - ( 15 Rich 2024 08:00 )    Color: x / Appearance: x / SG: x / pH: x  Gluc: 56 mg/dL / Ketone: x  / Bili: x / Urobili: x   Blood: x / Protein: x / Nitrite: x   Leuk Esterase: x / RBC: x / WBC x   Sq Epi: x / Non Sq Epi: x / Bacteria: x            RADIOLOGY & ADDITIONAL TESTS:       [facility] Medicine  Patient is a 83y old  Male who presents with a chief complaint of frequent falls, weakness (15 Rich 2024 12:52)      SUBJECTIVE / OVERNIGHT EVENTS:  No acute events over night.  patient is seen at bedside, nonverbal, closing eyes but reactive to verbal stimuli. ROS unable to obtain.    MEDICATIONS  (STANDING):  aspirin  chewable 81 milliGRAM(s) Oral daily  chlorhexidine 2% Cloths 1 Application(s) Topical <User Schedule>  donepezil 5 milliGRAM(s) Oral at bedtime  enoxaparin Injectable 40 milliGRAM(s) SubCutaneous every 24 hours  melatonin 3 milliGRAM(s) Oral at bedtime  sodium chloride 0.9%. 1000 milliLiter(s) (100 mL/Hr) IV Continuous <Continuous>  tamsulosin 0.4 milliGRAM(s) Oral at bedtime    MEDICATIONS  (PRN):  LORazepam   Injectable 1 milliGRAM(s) IV Push every 6 hours PRN Agitation  morphine  - Injectable 0.5 milliGRAM(s) IV Push every 4 hours PRN Severe Pain (7 - 10)  polyethylene glycol 3350 17 Gram(s) Oral daily PRN Constipation  senna 2 Tablet(s) Oral at bedtime PRN Constipation          OBJECTIVE:  Vital Signs Last 24 Hrs  T(C): 36.3 (15 Rich 2024 05:00), Max: 36.4 (14 Jan 2024 14:50)  T(F): 97.4 (15 Rich 2024 05:00), Max: 97.6 (14 Jan 2024 14:50)  HR: 77 (15 Rich 2024 05:00) (70 - 81)  BP: 120/46 (15 Rich 2024 05:00) (106/44 - 140/63)  BP(mean): --  RR: 18 (15 Rich 2024 05:00) (16 - 18)  SpO2: 94% (15 Rich 2024 05:00) (93% - 96%)    Parameters below as of 15 Rich 2024 05:00  Patient On (Oxygen Delivery Method): room air        PHYSICAL EXAM:  GENERAL: somnolent, opens eyes to verbal stimuli  HEAD:  Atraumatic, Normocephalic  EYES: conjunctiva and sclera clear  NECK: Supple, No JVD  CHEST/LUNG: Clear to auscultation bilaterally; No wheeze  HEART: Regular rate and rhythm; No murmurs, rubs, or gallops  ABDOMEN: Soft, Nontender, Nondistended; Bowel sounds present  EXTREMITIES:  No clubbing, cyanosis, or edema. Has R thigh mass   PSYCH: AAOx0  NEUROLOGY: non-focal  SKIN: No rashes or lesions      CAPILLARY BLOOD GLUCOSE        I&O's Summary            LABS:                        10.7   54.36 )-----------( 359      ( 15 Rich 2024 08:00 )             38.3     01-15    151<H>  |  122<H>  |  31<H>  ----------------------------<  56<L>  4.3   |  18<L>  |  0.92    Ca    11.6<H>      15 Rich 2024 08:00    TPro  6.5  /  Alb  1.5<L>  /  TBili  0.8  /  DBili  x   /  AST  27  /  ALT  20  /  AlkPhos  219<H>  01-15          Urinalysis Basic - ( 15 Rich 2024 08:00 )    Color: x / Appearance: x / SG: x / pH: x  Gluc: 56 mg/dL / Ketone: x  / Bili: x / Urobili: x   Blood: x / Protein: x / Nitrite: x   Leuk Esterase: x / RBC: x / WBC x   Sq Epi: x / Non Sq Epi: x / Bacteria: x            RADIOLOGY & ADDITIONAL TESTS:

## 2024-01-15 NOTE — CHART NOTE - NSCHARTNOTEFT_GEN_A_CORE
EVENT: AM labs reviewed and notable for hypernatremia.          Vital Signs Last 24 Hrs  T(C): 37 (15 Rich 2024 14:05), Max: 37 (15 Rich 2024 14:05)  T(F): 98.6 (15 Rich 2024 14:05), Max: 98.6 (15 Rich 2024 14:05)  HR: 89 (15 Rich 2024 14:05) (70 - 89)  BP: 130/60 (15 Rich 2024 14:05) (106/44 - 130/60)  RR: 18 (15 Rich 2024 14:05) (16 - 18)  SpO2: 100% (15 Rich 2024 14:05) (93% - 100%)    Parameters below as of 15 Rich 2024 14:05  Patient On (Oxygen Delivery Method): room air        LABS:                        10.7   54.36 )-----------( 359      ( 15 Rich 2024 08:00 )             38.3     01-15    151<H>  |  122<H>  |  31<H>  ----------------------------<  56<L>  4.3   |  18<L>  |  0.92    Ca    11.6<H>      15 Rich 2024 08:00    TPro  6.5  /  Alb  1.5<L>  /  TBili  0.8  /  DBili  x   /  AST  27  /  ALT  20  /  AlkPhos  219<H>  01-15      EKG:   IMAGING:    ASSESSMENT:  HPI:  83 year old, Male, w/ PMH of BPH w/ urinary retention, stroke (2013), emphysema, afib not on ac, dementia, erectile dysfunction, vertigo, leukocytosis of unknown origin (currently being worked up at [facility] Dr. 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  Admitted for ambulatory dysfunction and malignancy workup.        PLAN:   - Planning for LTC Hospice.    - Started D5 x 1d.  Reassess Na in AM-f/u and adjust IVF.      - Above discussed w/ Attending. EVENT: AM labs reviewed and notable for hypernatremia.          Vital Signs Last 24 Hrs  T(C): 37 (15 Rich 2024 14:05), Max: 37 (15 Rich 2024 14:05)  T(F): 98.6 (15 Rich 2024 14:05), Max: 98.6 (15 Rich 2024 14:05)  HR: 89 (15 Rich 2024 14:05) (70 - 89)  BP: 130/60 (15 Rich 2024 14:05) (106/44 - 130/60)  RR: 18 (15 Rich 2024 14:05) (16 - 18)  SpO2: 100% (15 Rich 2024 14:05) (93% - 100%)    Parameters below as of 15 Rich 2024 14:05  Patient On (Oxygen Delivery Method): room air        LABS:                        10.7   54.36 )-----------( 359      ( 15 Rich 2024 08:00 )             38.3     01-15    151<H>  |  122<H>  |  31<H>  ----------------------------<  56<L>  4.3   |  18<L>  |  0.92    Ca    11.6<H>      15 Rich 2024 08:00    TPro  6.5  /  Alb  1.5<L>  /  TBili  0.8  /  DBili  x   /  AST  27  /  ALT  20  /  AlkPhos  219<H>  01-15      EKG:   IMAGING:    ASSESSMENT:  HPI:  83 year old, Male, w/ PMH of BPH w/ urinary retention, stroke (2013), emphysema, afib not on ac, dementia, erectile dysfunction, vertigo, leukocytosis of unknown origin (currently being worked up at [facility] Dr. 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  Admitted for ambulatory dysfunction and malignancy workup.        PLAN:   - Planning for LTC Hospice.    - Started D5 x 1d.  Reassess Na in AM-f/u and adjust IVF.      - Above discussed w/ Attending. EVENT: AM labs reviewed and notable for hypernatremia.          Vital Signs Last 24 Hrs  T(C): 37 (15 Rich 2024 14:05), Max: 37 (15 Rich 2024 14:05)  T(F): 98.6 (15 Rich 2024 14:05), Max: 98.6 (15 Rich 2024 14:05)  HR: 89 (15 Rich 2024 14:05) (70 - 89)  BP: 130/60 (15 Rich 2024 14:05) (106/44 - 130/60)  RR: 18 (15 Rich 2024 14:05) (16 - 18)  SpO2: 100% (15 Rich 2024 14:05) (93% - 100%)    Parameters below as of 15 Rich 2024 14:05  Patient On (Oxygen Delivery Method): room air        LABS:                        10.7   54.36 )-----------( 359      ( 15 Rich 2024 08:00 )             38.3     01-15    151<H>  |  122<H>  |  31<H>  ----------------------------<  56<L>  4.3   |  18<L>  |  0.92    Ca    11.6<H>      15 Rich 2024 08:00    TPro  6.5  /  Alb  1.5<L>  /  TBili  0.8  /  DBili  x   /  AST  27  /  ALT  20  /  AlkPhos  219<H>  01-15      EKG:   IMAGING:    ASSESSMENT:  HPI:  83 year old, Male, w/ PMH of BPH w/ urinary retention, stroke (2013), emphysema, afib not on ac, dementia, erectile dysfunction, vertigo, leukocytosis of unknown origin (currently being worked up at [facility] Dr. 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  Admitted for ambulatory dysfunction and malignancy workup.        PLAN:   - Planning for LTC Hospice.    - Started D5 x 1d.  Reassess Na in AM-f/u and adjust IVF.      - Above discussed w/ Attending. EVENT: AM labs reviewed and notable for hypernatremia.          Vital Signs Last 24 Hrs  T(C): 37 (15 Rich 2024 14:05), Max: 37 (15 Rich 2024 14:05)  T(F): 98.6 (15 Rich 2024 14:05), Max: 98.6 (15 Rich 2024 14:05)  HR: 89 (15 Rich 2024 14:05) (70 - 89)  BP: 130/60 (15 Rich 2024 14:05) (106/44 - 130/60)  RR: 18 (15 Rich 2024 14:05) (16 - 18)  SpO2: 100% (15 Rich 2024 14:05) (93% - 100%)    Parameters below as of 15 Rich 2024 14:05  Patient On (Oxygen Delivery Method): room air        LABS:                        10.7   54.36 )-----------( 359      ( 15 Rich 2024 08:00 )             38.3     01-15    151<H>  |  122<H>  |  31<H>  ----------------------------<  56<L>  4.3   |  18<L>  |  0.92    Ca    11.6<H>      15 Rich 2024 08:00    TPro  6.5  /  Alb  1.5<L>  /  TBili  0.8  /  DBili  x   /  AST  27  /  ALT  20  /  AlkPhos  219<H>  01-15      EKG:   IMAGING:    ASSESSMENT:  HPI:  83 year old, Male, w/ PMH of BPH w/ urinary retention, stroke (2013), emphysema, afib not on ac, dementia, erectile dysfunction, vertigo, leukocytosis of unknown origin (currently being worked up at [facility] Dr. 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  Admitted for ambulatory dysfunction and malignancy workup.        PLAN:   - Planning for LTC Hospice.    - Started D5 x 1d.  Reassess Na in AM-f/u and adjust IVF.  Labs in AM-f/u.     - Above discussed w/ Attending. EVENT: AM labs reviewed and notable for hypernatremia.          Vital Signs Last 24 Hrs  T(C): 37 (15 Rich 2024 14:05), Max: 37 (15 Rich 2024 14:05)  T(F): 98.6 (15 Rich 2024 14:05), Max: 98.6 (15 Rich 2024 14:05)  HR: 89 (15 Rich 2024 14:05) (70 - 89)  BP: 130/60 (15 Rich 2024 14:05) (106/44 - 130/60)  RR: 18 (15 Rich 2024 14:05) (16 - 18)  SpO2: 100% (15 Rich 2024 14:05) (93% - 100%)    Parameters below as of 15 Rich 2024 14:05  Patient On (Oxygen Delivery Method): room air        LABS:                        10.7   54.36 )-----------( 359      ( 15 Rich 2024 08:00 )             38.3     01-15    151<H>  |  122<H>  |  31<H>  ----------------------------<  56<L>  4.3   |  18<L>  |  0.92    Ca    11.6<H>      15 Rich 2024 08:00    TPro  6.5  /  Alb  1.5<L>  /  TBili  0.8  /  DBili  x   /  AST  27  /  ALT  20  /  AlkPhos  219<H>  01-15      EKG:   IMAGING:    ASSESSMENT:  HPI:  83 year old, Male, w/ PMH of BPH w/ urinary retention, stroke (2013), emphysema, afib not on ac, dementia, erectile dysfunction, vertigo, leukocytosis of unknown origin (currently being worked up at [facility] Dr. 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  Admitted for ambulatory dysfunction and malignancy workup.        PLAN:   - Planning for LTC Hospice.    - Started D5 x 1d.  Reassess Na in AM-f/u and adjust IVF.  Labs in AM-f/u.     - Above discussed w/ Attending. EVENT: AM labs reviewed and notable for hypernatremia.          Vital Signs Last 24 Hrs  T(C): 37 (15 Rich 2024 14:05), Max: 37 (15 Rich 2024 14:05)  T(F): 98.6 (15 Rich 2024 14:05), Max: 98.6 (15 Rich 2024 14:05)  HR: 89 (15 Rich 2024 14:05) (70 - 89)  BP: 130/60 (15 Rich 2024 14:05) (106/44 - 130/60)  RR: 18 (15 Rich 2024 14:05) (16 - 18)  SpO2: 100% (15 Rich 2024 14:05) (93% - 100%)    Parameters below as of 15 Rich 2024 14:05  Patient On (Oxygen Delivery Method): room air        LABS:                        10.7   54.36 )-----------( 359      ( 15 Rich 2024 08:00 )             38.3     01-15    151<H>  |  122<H>  |  31<H>  ----------------------------<  56<L>  4.3   |  18<L>  |  0.92    Ca    11.6<H>      15 Rich 2024 08:00    TPro  6.5  /  Alb  1.5<L>  /  TBili  0.8  /  DBili  x   /  AST  27  /  ALT  20  /  AlkPhos  219<H>  01-15      EKG:   IMAGING:    ASSESSMENT:  HPI:  83 year old, Male, w/ PMH of BPH w/ urinary retention, stroke (2013), emphysema, afib not on ac, dementia, erectile dysfunction, vertigo, leukocytosis of unknown origin (currently being worked up at [facility] Dr. 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  Admitted for ambulatory dysfunction and malignancy workup.        PLAN:   - Planning for LTC Hospice.    - Started D5 x 1d.  Reassess Na in AM-f/u and adjust IVF.  Labs in AM-f/u.     - Above discussed w/ Attending.

## 2024-01-15 NOTE — PROGRESS NOTE ADULT - SUBJECTIVE AND OBJECTIVE BOX
HPI:  Pt is a 83 y.o M w/ PMHx of BPH w/ urinary retention, stroke (2013), HTN, DM, afib not on ac, dementia, erectile dysfunction, vertigo, leukocytosis of unknown origin (currently being worked up at [facility] Dr. 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 Wife in the HCP and is at bedside assisting in history taking. This was purely a mechanical fall as per patient and family, denies LOC, hitting head. States that he has vertigo and balance issues, but he has also been very weak, especially in the past three months. Pt endorses losing 60 pounds in the past 1.5 years, and wife endorses that he has had decreased appetite for the past 3 months. Pt also has intermittent night sweats. Pt and wife have noticed a right thigh mass, that they saw surgical oncologist Dr. Pugh for out-patient, and in order to do a biopsy on the mass, pt need cardiac clearance (which is why they were going to the cardiologist this morning). According to the wife, pt has also been seeing Dr. Domingo for his BPH and questionable prostate cancer. Denies dizziness currently, chest pain, sob, palpitations, fevers, chills, sick contacts, recent travel , urinary symptoms.     [facility] w/u from paperwork brought in by wife:  1. 11/2023 MRI Prostate- 2.1 x 1.4 cm lesion in right anterior base to mid gland transition zone suspicious for prostate ca.   2. 11/2023 CT chest ab/p/soft tissues- complex 14 cm mass of the medial right upper thigh in the level of the gracilis muscle concerning for  malignant soft tissue neoplasm, pulmonary nodules w/ emphysema, complex 0.9 cm left renal lesion possibly neoplasm   3. 10/2023 MRI Brain non con - mild to mod cerebral atrophy and chronic microvascular ischemic changes in the white matter. Chronic right cerebellar infarct  (04 Jan 2024 18:35)     Pt is seen and examined  pt is awake and lying in bed/out of bed to chair  pt seems comfortable and denies any complaints at this time    ROS:  Negative except for:    MEDICATIONS  (STANDING):  aspirin  chewable 81 milliGRAM(s) Oral daily  chlorhexidine 2% Cloths 1 Application(s) Topical <User Schedule>  donepezil 5 milliGRAM(s) Oral at bedtime  enoxaparin Injectable 40 milliGRAM(s) SubCutaneous every 24 hours  melatonin 3 milliGRAM(s) Oral at bedtime  sodium chloride 0.9%. 1000 milliLiter(s) (100 mL/Hr) IV Continuous <Continuous>  tamsulosin 0.4 milliGRAM(s) Oral at bedtime    MEDICATIONS  (PRN):  LORazepam   Injectable 1 milliGRAM(s) IV Push every 6 hours PRN Agitation  morphine  - Injectable 0.5 milliGRAM(s) IV Push every 4 hours PRN Severe Pain (7 - 10)  polyethylene glycol 3350 17 Gram(s) Oral daily PRN Constipation  senna 2 Tablet(s) Oral at bedtime PRN Constipation      Allergies    Seafood (Unknown)  No Known Drug Allergies    Intolerances        Vital Signs Last 24 Hrs  T(C): 36.3 (15 Rich 2024 05:00), Max: 36.4 (14 Jan 2024 14:50)  T(F): 97.4 (15 Rich 2024 05:00), Max: 97.6 (14 Jan 2024 14:50)  HR: 77 (15 Rich 2024 05:00) (70 - 81)  BP: 120/46 (15 Rich 2024 05:00) (106/44 - 140/63)  BP(mean): --  RR: 18 (15 Rich 2024 05:00) (16 - 18)  SpO2: 94% (15 Rich 2024 05:00) (93% - 96%)    Parameters below as of 15 Rich 2024 05:00  Patient On (Oxygen Delivery Method): room air        PHYSICAL EXAM  General: adult in NAD  HEENT: clear oropharynx, anicteric sclera, pink conjunctiva  Neck: supple  CV: normal S1/S2 with no murmur rubs or gallops  Lungs: positive air movement b/l ant lungs,clear to auscultation, no wheezes, no rales  Abdomen: soft non-tender non-distended, no hepatosplenomegaly  Ext: no clubbing cyanosis or edema  Skin: no rashes and no petechiae  Neuro: alert and oriented X 4, no focal deficits  LABS:                          10.7   54.36 )-----------( 359      ( 15 Rich 2024 08:00 )             38.3         Mean Cell Volume : 102.7 fl  Mean Cell Hemoglobin : 28.7 pg  Mean Cell Hemoglobin Concentration : 27.9 gm/dL  Auto Neutrophil # : x  Auto Lymphocyte # : x  Auto Monocyte # : x  Auto Eosinophil # : x  Auto Basophil # : x  Auto Neutrophil % : x  Auto Lymphocyte % : x  Auto Monocyte % : x  Auto Eosinophil % : x  Auto Basophil % : x    Serial CBC  Hematocrit 38.3  Hemoglobin 10.7  Plat 359  RBC 3.73  WBC 54.36                          BLOOD SMEAR INTERPRETATION:       RADIOLOGY & ADDITIONAL STUDIES:       HPI:  Pt is a 83 y.o M w/ PMHx of BPH w/ urinary retention, stroke (2013), HTN, DM, afib not on ac, dementia, erectile dysfunction, vertigo, leukocytosis of unknown origin (currently being worked up at [facility] Dr. 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 Wife in the HCP and is at bedside assisting in history taking. This was purely a mechanical fall as per patient and family, denies LOC, hitting head. States that he has vertigo and balance issues, but he has also been very weak, especially in the past three months. Pt endorses losing 60 pounds in the past 1.5 years, and wife endorses that he has had decreased appetite for the past 3 months. Pt also has intermittent night sweats. Pt and wife have noticed a right thigh mass, that they saw surgical oncologist Dr. Pugh for out-patient, and in order to do a biopsy on the mass, pt need cardiac clearance (which is why they were going to the cardiologist this morning). According to the wife, pt has also been seeing Dr. Domingo for his BPH and questionable prostate cancer. Denies dizziness currently, chest pain, sob, palpitations, fevers, chills, sick contacts, recent travel , urinary symptoms.     [facility] w/u from paperwork brought in by wife:  1. 11/2023 MRI Prostate- 2.1 x 1.4 cm lesion in right anterior base to mid gland transition zone suspicious for prostate ca.   2. 11/2023 CT chest ab/p/soft tissues- complex 14 cm mass of the medial right upper thigh in the level of the gracilis muscle concerning for  malignant soft tissue neoplasm, pulmonary nodules w/ emphysema, complex 0.9 cm left renal lesion possibly neoplasm   3. 10/2023 MRI Brain non con - mild to mod cerebral atrophy and chronic microvascular ischemic changes in the white matter. Chronic right cerebellar infarct  (04 Jan 2024 18:35)     Pt is seen and examined  pt is awake and lying in bed/out of bed to chair  pt seems comfortable and denies any complaints at this time    ROS:  Negative except for:    MEDICATIONS  (STANDING):  aspirin  chewable 81 milliGRAM(s) Oral daily  chlorhexidine 2% Cloths 1 Application(s) Topical <User Schedule>  donepezil 5 milliGRAM(s) Oral at bedtime  enoxaparin Injectable 40 milliGRAM(s) SubCutaneous every 24 hours  melatonin 3 milliGRAM(s) Oral at bedtime  sodium chloride 0.9%. 1000 milliLiter(s) (100 mL/Hr) IV Continuous <Continuous>  tamsulosin 0.4 milliGRAM(s) Oral at bedtime    MEDICATIONS  (PRN):  LORazepam   Injectable 1 milliGRAM(s) IV Push every 6 hours PRN Agitation  morphine  - Injectable 0.5 milliGRAM(s) IV Push every 4 hours PRN Severe Pain (7 - 10)  polyethylene glycol 3350 17 Gram(s) Oral daily PRN Constipation  senna 2 Tablet(s) Oral at bedtime PRN Constipation      Allergies    Seafood (Unknown)  No Known Drug Allergies    Intolerances        Vital Signs Last 24 Hrs  T(C): 36.3 (15 Rich 2024 05:00), Max: 36.4 (14 Jan 2024 14:50)  T(F): 97.4 (15 Rich 2024 05:00), Max: 97.6 (14 Jan 2024 14:50)  HR: 77 (15 Rich 2024 05:00) (70 - 81)  BP: 120/46 (15 Rich 2024 05:00) (106/44 - 140/63)  BP(mean): --  RR: 18 (15 Rich 2024 05:00) (16 - 18)  SpO2: 94% (15 Rich 2024 05:00) (93% - 96%)    Parameters below as of 15 Rich 2024 05:00  Patient On (Oxygen Delivery Method): room air        PHYSICAL EXAM  General: adult in NAD  HEENT: clear oropharynx, anicteric sclera, pink conjunctiva  Neck: supple  CV: normal S1/S2 with no murmur rubs or gallops  Lungs: positive air movement b/l ant lungs,clear to auscultation, no wheezes, no rales  Abdomen: soft non-tender non-distended, no hepatosplenomegaly  Ext: no clubbing cyanosis or edema  Skin: no rashes and no petechiae  Neuro: alert and oriented X 4, no focal deficits  LABS:                          10.7   54.36 )-----------( 359      ( 15 Rich 2024 08:00 )             38.3         Mean Cell Volume : 102.7 fl  Mean Cell Hemoglobin : 28.7 pg  Mean Cell Hemoglobin Concentration : 27.9 gm/dL  Auto Neutrophil # : x  Auto Lymphocyte # : x  Auto Monocyte # : x  Auto Eosinophil # : x  Auto Basophil # : x  Auto Neutrophil % : x  Auto Lymphocyte % : x  Auto Monocyte % : x  Auto Eosinophil % : x  Auto Basophil % : x    Serial CBC  Hematocrit 38.3  Hemoglobin 10.7  Plat 359  RBC 3.73  WBC 54.36                          BLOOD SMEAR INTERPRETATION:       RADIOLOGY & ADDITIONAL STUDIES:       HPI:  Pt is a 83 y.o M w/ PMHx of BPH w/ urinary retention, stroke (2013), HTN, DM, afib not on ac, dementia, erectile dysfunction, vertigo, leukocytosis of unknown origin (currently being worked up at [facility] Dr. 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 Wife in the HCP and is at bedside assisting in history taking. This was purely a mechanical fall as per patient and family, denies LOC, hitting head. States that he has vertigo and balance issues, but he has also been very weak, especially in the past three months. Pt endorses losing 60 pounds in the past 1.5 years, and wife endorses that he has had decreased appetite for the past 3 months. Pt also has intermittent night sweats. Pt and wife have noticed a right thigh mass, that they saw surgical oncologist Dr. Pugh for out-patient, and in order to do a biopsy on the mass, pt need cardiac clearance (which is why they were going to the cardiologist this morning). According to the wife, pt has also been seeing Dr. Domingo for his BPH and questionable prostate cancer. Denies dizziness currently, chest pain, sob, palpitations, fevers, chills, sick contacts, recent travel , urinary symptoms.     [facility] w/u from paperwork brought in by wife:  1. 11/2023 MRI Prostate- 2.1 x 1.4 cm lesion in right anterior base to mid gland transition zone suspicious for prostate ca.   2. 11/2023 CT chest ab/p/soft tissues- complex 14 cm mass of the medial right upper thigh in the level of the gracilis muscle concerning for  malignant soft tissue neoplasm, pulmonary nodules w/ emphysema, complex 0.9 cm left renal lesion possibly neoplasm   3. 10/2023 MRI Brain non con - mild to mod cerebral atrophy and chronic microvascular ischemic changes in the white matter. Chronic right cerebellar infarct  (04 Jan 2024 18:35)     Pt is seen and examined  pt is awake and lying in bed/out of bed to chair  pt seems comfortable and denies any complaints at this time    ROS:  Negative except for:    MEDICATIONS  (STANDING):  aspirin  chewable 81 milliGRAM(s) Oral daily  chlorhexidine 2% Cloths 1 Application(s) Topical <User Schedule>  donepezil 5 milliGRAM(s) Oral at bedtime  enoxaparin Injectable 40 milliGRAM(s) SubCutaneous every 24 hours  melatonin 3 milliGRAM(s) Oral at bedtime  sodium chloride 0.9%. 1000 milliLiter(s) (100 mL/Hr) IV Continuous <Continuous>  tamsulosin 0.4 milliGRAM(s) Oral at bedtime    MEDICATIONS  (PRN):  LORazepam   Injectable 1 milliGRAM(s) IV Push every 6 hours PRN Agitation  morphine  - Injectable 0.5 milliGRAM(s) IV Push every 4 hours PRN Severe Pain (7 - 10)  polyethylene glycol 3350 17 Gram(s) Oral daily PRN Constipation  senna 2 Tablet(s) Oral at bedtime PRN Constipation      Allergies    Seafood (Unknown)  No Known Drug Allergies    Intolerances        Vital Signs Last 24 Hrs  T(C): 36.3 (15 Rich 2024 05:00), Max: 36.4 (14 Jan 2024 14:50)  T(F): 97.4 (15 Rich 2024 05:00), Max: 97.6 (14 Jan 2024 14:50)  HR: 77 (15 Rich 2024 05:00) (70 - 81)  BP: 120/46 (15 Rich 2024 05:00) (106/44 - 140/63)  BP(mean): --  RR: 18 (15 Rich 2024 05:00) (16 - 18)  SpO2: 94% (15 Rich 2024 05:00) (93% - 96%)    Parameters below as of 15 Rich 2024 05:00  Patient On (Oxygen Delivery Method): room air        PHYSICAL EXAM  General: adult in NAD  HEENT: clear oropharynx, anicteric sclera, pink conjunctiva  Neck: supple  CV: normal S1/S2 with no murmur rubs or gallops  Lungs: positive air movement b/l ant lungs,clear to auscultation, no wheezes, no rales  Abdomen: soft non-tender non-distended, no hepatosplenomegaly  Ext: no clubbing cyanosis or edema  Skin: no rashes and no petechiae  Neuro: alert and oriented X 4, no focal deficits  LABS:                          10.7   54.36 )-----------( 359      ( 15 Rich 2024 08:00 )             38.3         Mean Cell Volume : 102.7 fl  Mean Cell Hemoglobin : 28.7 pg  Mean Cell Hemoglobin Concentration : 27.9 gm/dL  Auto Neutrophil # : x  Auto Lymphocyte # : x  Auto Monocyte # : x  Auto Eosinophil # : x  Auto Basophil # : x  Auto Neutrophil % : x  Auto Lymphocyte % : x  Auto Monocyte % : x  Auto Eosinophil % : x  Auto Basophil % : x    Serial CBC  Hematocrit 38.3  Hemoglobin 10.7  Plat 359  RBC 3.73  WBC 54.36                          BLOOD SMEAR INTERPRETATION:       RADIOLOGY & ADDITIONAL STUDIES:

## 2024-01-15 NOTE — PROGRESS NOTE ADULT - ASSESSMENT
83 y.o M w/ PMHx of BPH w/ urinary retention, stroke (2013), emphysema, afib not on AC, dementia, erectile dysfunction, vertigo, leukocytosis of unknown origin (currently being worked up at [facility] Dr. 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 Labs significant for elevated WBC. CTH shows no changes from CTH in 11/2023. Xray show no fractures. CXR shows scattered non-specific pulmonary infiltrates. Admitted for ambulatory dysfunction and malignancy workup. Further workup shows multiple nodules in the lungs suggestive of metastases. Oncology consulted, recommending biopsy for further guidance of treatment, but patient is not considered a candidate for conventional chemotherapy given the age, low functional status and comorbidities. Surgery recommends needle core biopsy as the risk of surgical biopsy is deemed to overweigh the benefit. Attempted MRI but unable to obtain the imaging even with ativan as patient became restless.    # 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    # Lung nodules suspicious of malignancy   # Enlarged prostate, concerning for malignancy   # Leukocytosis, likely due to malignancy, ruled out leukemia with BM biopsy    # Anemia   # Delirium, possible hospital onset delirium; unlikely infection, CT head neg, electrolytes stable- resolved   # Hypercalcemia of malignancy   # Renal complex mass   # Abnormal EKG, appearing  2/2 wondering atrial pacemaker   # Atrial fibrillation not on AC   # hx of CVA, cerebellar infarct    # Emphysema    # Severe protein calorie malnutrition     - heme/onc recs appreciated   - patient was not able to tolerate MRI of R thigh for further evaluation  - lung biopsy discussed, HCP does not desire at this moment  - GOC discussion held with primary team, palliative care  - pall care further assistance appreciated, HCP desires full code for now  - HCP would like to seek LTC with hospice, SW assistance appreciated  - DVT ppx: Lovenox.   83 y.o M w/ PMHx of BPH w/ urinary retention, stroke (2013), emphysema, afib not on AC, dementia, erectile dysfunction, vertigo, leukocytosis of unknown origin (currently being worked up at [facility] Dr. 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 Labs significant for elevated WBC. CTH shows no changes from CTH in 11/2023. Xray show no fractures. CXR shows scattered non-specific pulmonary infiltrates. Admitted for ambulatory dysfunction and malignancy workup. Further workup shows multiple nodules in the lungs suggestive of metastases. Oncology consulted, recommending biopsy for further guidance of treatment, but patient is not considered a candidate for conventional chemotherapy given the age, low functional status and comorbidities. Surgery recommends needle core biopsy as the risk of surgical biopsy is deemed to overweigh the benefit. Attempted MRI but unable to obtain the imaging even with ativan as patient became restless.    # 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    # Lung nodules suspicious of malignancy   # Enlarged prostate, concerning for malignancy   # Leukocytosis, likely due to malignancy, ruled out leukemia with BM biopsy    # Anemia   # Delirium, possible hospital onset delirium; unlikely infection, CT head neg, electrolytes stable- resolved   # Hypercalcemia of malignancy   # Renal complex mass   # Abnormal EKG, appearing  2/2 wondering atrial pacemaker   # Atrial fibrillation not on AC   # hx of CVA, cerebellar infarct    # Emphysema    # Severe protein calorie malnutrition     - heme/onc recs appreciated   - patient was not able to tolerate MRI of R thigh for further evaluation  - lung biopsy discussed, HCP does not desire at this moment  - GOC discussion held with primary team, palliative care  - pall care further assistance appreciated, HCP desires full code for now  - HCP would like to seek LTC with hospice, SW assistance appreciated  - DVT ppx: Lovenox.   83 y.o M w/ PMHx of BPH w/ urinary retention, stroke (2013), emphysema, afib not on AC, dementia, erectile dysfunction, vertigo, leukocytosis of unknown origin (currently being worked up at [facility] Dr. 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 Labs significant for elevated WBC. CTH shows no changes from CTH in 11/2023. Xray show no fractures. CXR shows scattered non-specific pulmonary infiltrates. Admitted for ambulatory dysfunction and malignancy workup. Further workup shows multiple nodules in the lungs suggestive of metastases. Oncology consulted, recommending biopsy for further guidance of treatment, but patient is not considered a candidate for conventional chemotherapy given the age, low functional status and comorbidities. Surgery recommends needle core biopsy as the risk of surgical biopsy is deemed to overweigh the benefit. Attempted MRI but unable to obtain the imaging even with ativan as patient became restless.    # 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    # Lung nodules suspicious of malignancy   # Enlarged prostate, concerning for malignancy   # Leukocytosis, likely due to malignancy, ruled out leukemia with BM biopsy    # Anemia   # Delirium, possible hospital onset delirium; unlikely infection, CT head neg, electrolytes stable- resolved   # Hypercalcemia of malignancy   # Renal complex mass   # Abnormal EKG, appearing  2/2 wondering atrial pacemaker   # Atrial fibrillation not on AC   # hx of CVA, cerebellar infarct    # Emphysema    # Severe protein calorie malnutrition     - heme/onc recs appreciated   - patient was not able to tolerate MRI of R thigh for further evaluation  - lung biopsy discussed, HCP does not desire at this moment  - GOC discussion held with primary team, palliative care  - pall care further assistance appreciated, HCP desires full code for now  - HCP would like to seek LTC with hospice, SW assistance appreciated  - DVT ppx: Lovenox.

## 2024-01-15 NOTE — PROGRESS NOTE ADULT - ASSESSMENT
· Assessment	  NIKHIL CASTRO is a 83y Male who presents with a chief complaint of falls    Metastatic Malignancy  ·	Patient with known right upper thigh mass. Imaging also showed metastatic burden in the lungs.  ·	Concern for sarcoma. Patient may also have a prostate primary given elevated PSA (though < 10) with recent MRI of the prostate PIRADS-5.   ·	Patient is not a surgical candidate for excision.  ·	Interventional radiology consultation for biopsy. Will follow-up on pathology.  ·	MRI was unable to be done 1/9, consider biopsy of one of the lung met.   ·	Urology recommendations noted; no plans for inpatient workup or procedures at this time.  ·	Pallliative care on board.  GOC discussions ongoing.     Leukocytosis  ·	Bone marrow biopsy in November 2023 was unremarkable with no evidence of hematological malignancy.  ·	Likely reactive to ongoing malignancy or inflammation.  ·	Rule out infections.  ·	Continue to monitor and trend.    Anemia  ·	In the setting of malignancy, inflammation. Ferritin very elevated.  ·	overall stable  ·	No need for further workup or intervention at this time.   ·	Will follow-up on remaining labs.   ·	Monitor and maintain above 7.    Will continue to follow.

## 2024-01-16 NOTE — PROGRESS NOTE ADULT - PROBLEM SELECTOR PLAN 6
Clinical evidence indicates that the patient has Severe protein calorie malnutrition/ 3rd degree    In context of      Chronic Illness (>1 month)--progressive vascular dementia, now with metastatic cancer of unknown primary site, as evidenced by:    Energy/Food intake <50% of estimated energy requirement >5 days  Weight loss: Moderate - severe (lbs lost recently)  Body Fat loss: Severe   + mild temporal wasting  Muscle mass loss: Severe  -- albumin now 1.4   Strength: weakened severe (bedbound)    Recommend:   Continue soft and bite sized diet as tolerated - aspiration precautions, careful hand-feeding, teaching to caregivers  On supplementation with Ensure Enlive BID    Family undecided about long term feeding at this time--GOC discussions ongoing

## 2024-01-16 NOTE — PROGRESS NOTE ADULT - PROBLEM SELECTOR PLAN 3
H/o Leukocytosis, follows w/ Dr. Pugh @ [facility], with negative outpatient leukemia workup  - P/w WBC 47.92 on admission uptrended to 57  - Likely reactive in s/o malignancy   - Continue to monitor CBC in AM-f/u results   - [facility]-Dr. Flannery consulted, appreciated.  Bone marrow bx in November 2023 was unremarkable with no evidence of hematological malignancy. H/o Leukocytosis, follows w/ Dr. Pugh @ [facility], with negative outpatient leukemia workup  - P/w WBC 47.92 on admission uptrended to 57  - Likely reactive in s/o malignancy   - Continue to monitor CBC in AM-f/u results   - [facility]-Dr. Flannery consulted, appreciated.  Bone marrow bx in November 2023 was unremarkable with no evidence of hematological malignancy. H/o  mechanical fall outpt  - PT recommended ABRAHAM.  Possibly LTC w/ Hospice.

## 2024-01-16 NOTE — PROGRESS NOTE ADULT - PROBLEM SELECTOR PLAN 9
- Pt from home  - Palliative following for dc planning.  Possibly LTC w/ Hospice. - C/w Lovenox for DVT ppx

## 2024-01-16 NOTE — PROGRESS NOTE ADULT - PROBLEM SELECTOR PLAN 4
Wife now in agreement with PRN IV opioids  Continue IV morphine 0.5 mg Q 4 hrs PRN, titrate as needed    Bowel regimen in place with Senna and Dulcolax, may need to consider Dulcolax supp PRN

## 2024-01-16 NOTE — PROVIDER CONTACT NOTE (CRITICAL VALUE NOTIFICATION) - DATE AND TIME:
06-Jan-2024 08:56
07-Jan-2024 08:51
15-Rich-2024 09:33
16-Jan-2024 08:21
10-Rich-2024 08:28
10-Rich-2024 13:08
05-Jan-2024 09:46
09-Jan-2024 10:59

## 2024-01-16 NOTE — PROGRESS NOTE ADULT - PROBLEM SELECTOR PLAN 7
As above.  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  ECOG 4  with PPS for 30%.  He is a poor candidate for any cancer directed treatment (systemic or otherwise).  Hospice appropriate with prognosis of weeks to  months.  Now low threshold for inpatient hospice/IPU with worsening pain and agitation over last several days, although symptoms appear to be better managed today (started on PRN IV morphine over the weekend).    See GOC discussions from 1/10 and 1/12--wife does not want to pursue further biopsy or work-up, verbally in agreement with hospice in LTC/facility setting, exploring options. Still no decisions about LST or transitioning to comfort care as of today; asked wife for family meeting with her and children ASAP, she voiced agreement.      Remainder of management as per primary medical team  Continue IV lorazepam PRN agitation and IV morphine PRN pain  Ongoing collaboration with Pall Care team SW to explore LTC hospice options   Discussed with primary team and Dr. Payan in detail  Palliative Care team will continue to follow.

## 2024-01-16 NOTE — PROGRESS NOTE ADULT - PROBLEM SELECTOR PLAN 1
H/o Leukocytosis, follows w/ Dr. Pugh @ [facility], with negative outpatient leukemia workup  - S/p CT Abd & Pelvis showed right thigh mass  - S/p CT Chest showed extensive progressive metastatic burden to the lungs  - [facility]-Dr. Sage consulted, appreciated-not a candidate for excisional biopsy  - S/p IR for core biopsy of thigh and pt unable to tolerate MR despite Ativan prior  - [facility]-Dr. Flannery consulted, appreciated   - Palliative following for dc planning H/o Leukocytosis, follows w/ Dr. Pugh @ [facility], with negative outpatient leukemia workup  - S/p CT Abd & Pelvis showed right thigh mass  - S/p CT Chest showed extensive progressive metastatic burden to the lungs  - [facility]-Dr. Sage consulted, appreciated-not a candidate for excisional biopsy  - S/p IR for core biopsy of thigh and pt unable to tolerate MR despite Ativan prior  - [facility]-Dr. Flannery consulted, appreciated   - Palliative following for dc planning - Pt lethargic with decreased PO intake  - C/w Feeding assist  - C/w D5  - CMP in AM-f/u results   - Palliative following for dc planning - Pt lethargic with decreased PO intake  - Attending requested feeding assist.  Placed on calorie count.    - C/w D5  - CMP in AM-f/u results   - Palliative following for dc planning

## 2024-01-16 NOTE — PROGRESS NOTE ADULT - PROBLEM SELECTOR PLAN 4
- P/w Ca=11.1, likely in s/o malignancy  - Continue to monitor CMP in AM-f/u results   - [facility]-Dr. Flannery consulted, appreciated H/o Leukocytosis, follows w/ Dr. Pugh @ [facility], with negative outpatient leukemia workup  - P/w WBC 47.92 on admission uptrended to 57  - Likely reactive in s/o malignancy   - Continue to monitor CBC in AM-f/u results   - [facility]-Dr. Flannery consulted, appreciated.  Bone marrow bx in November 2023 was unremarkable with no evidence of hematological malignancy. H/o Leukocytosis, follows w/ Dr. Pugh @ [facility], with negative outpatient leukemia workup  - P/w WBC 47.92 on admission uptrended to 57  - Likely reactive in s/o malignancy   - Continue to monitor CBC in AM-f/u results   - [facility]-Dr. Flannery consulted, appreciated.  Bone marrow bx in November 2023 was unremarkable with no evidence of hematological malignancy.

## 2024-01-16 NOTE — PROGRESS NOTE ADULT - PROBLEM SELECTOR PLAN 7
- C/w Flomax  - Urology-Dr. Hunt consulted, appreciated H/o AFib, not on meds    - Stable   - C/w ASA   - [facility]-Dr. Wilson consulted, appreciated

## 2024-01-16 NOTE — PROGRESS NOTE ADULT - PROBLEM SELECTOR PLAN 6
H/o AFib, not on meds    - Stable   - C/w ASA   - CV-Dr. Wilson consulted, appreciated - Most likely in s/o metastatic disease

## 2024-01-16 NOTE — PROGRESS NOTE ADULT - ASSESSMENT
83 year old, Male w/ PMH of BPH w/ urinary retention, stroke (2013), emphysema, afib not on ac, dementia, erectile dysfunction, vertigo, leukocytosis of unknown origin (currently being worked up at [facility] Dr. 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  Admitted for ambulatory dysfunction and malignancy workup.   83 year old, Male w/ PMH of BPH w/ urinary retention, stroke (2013), emphysema, afib not on ac, dementia, erectile dysfunction, vertigo, leukocytosis of unknown origin (currently being worked up at [facility] Dr. 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  Admitted for ambulatory dysfunction and malignancy workup.   83 year old, Male w/ PMH of BPH w/ urinary retention, stroke (2013), emphysema, afib (not on ac), dementia, erectile dysfunction, vertigo, leukocytosis of unknown origin (currently being worked up at [facility] Dr. 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  presented s/p fall on the way to his Cardiologist.  Admitted for Ambulatory dysfunction and malignancy workup.   83 year old, Male w/ PMH of BPH w/ urinary retention, stroke (2013), emphysema, afib (not on ac), dementia, erectile dysfunction, vertigo, leukocytosis of unknown origin (currently being worked up at [facility] Dr. 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  presented s/p fall on the way to his Cardiologist.  Admitted for Ambulatory dysfunction and malignancy workup.

## 2024-01-16 NOTE — PROVIDER CONTACT NOTE (CRITICAL VALUE NOTIFICATION) - NAME OF MD/NP/PA/DO NOTIFIED:
Adam, NP
VINEET Garza
VINEET Umanzor Essie
VINEET Garza
Kayla Mendiola
Kayla Mendiola
NP Samantha
VINEET Umanzor

## 2024-01-16 NOTE — PROGRESS NOTE ADULT - PROBLEM SELECTOR PLAN 5
- Most likely in s/o metastatic disease - P/w Ca=11.1, likely in s/o malignancy  - Continue to monitor CMP in AM-f/u results   - [facility]-Dr. Flannery consulted, appreciated

## 2024-01-16 NOTE — PROGRESS NOTE ADULT - SUBJECTIVE AND OBJECTIVE BOX
DATE OF SERVICE: 01-16-24    remains solmenent     aspirin  chewable 81 milliGRAM(s) Oral daily  chlorhexidine 2% Cloths 1 Application(s) Topical <User Schedule>  dextrose 5%. 1000 milliLiter(s) IV Continuous <Continuous>  donepezil 5 milliGRAM(s) Oral at bedtime  enoxaparin Injectable 40 milliGRAM(s) SubCutaneous every 24 hours  LORazepam   Injectable 1 milliGRAM(s) IV Push every 6 hours PRN  melatonin 3 milliGRAM(s) Oral at bedtime  morphine  - Injectable 0.5 milliGRAM(s) IV Push every 4 hours PRN  polyethylene glycol 3350 17 Gram(s) Oral daily PRN  senna 2 Tablet(s) Oral at bedtime PRN  tamsulosin 0.4 milliGRAM(s) Oral at bedtime                            9.1    54.83 )-----------( 398      ( 16 Jan 2024 06:30 )             31.4       Hemoglobin: 9.1 g/dL (01-16 @ 06:30)  Hemoglobin: 10.7 g/dL (01-15 @ 08:00)      01-16    148<H>  |  118<H>  |  27<H>  ----------------------------<  125<H>  4.0   |  27  |  0.80    Ca    10.9<H>      16 Jan 2024 06:30    TPro  6.0  /  Alb  1.4<L>  /  TBili  0.8  /  DBili  x   /  AST  25  /  ALT  18  /  AlkPhos  178<H>  01-16    Creatinine Trend: 0.80<--, 0.92<--, 0.91<--, 0.82<--, 0.90<--, 0.99<--    COAGS:           T(C): 35.7 (01-16-24 @ 13:57), Max: 36.4 (01-15-24 @ 22:00)  HR: 68 (01-16-24 @ 13:57) (65 - 72)  BP: 110/42 (01-16-24 @ 13:57) (108/46 - 131/72)  RR: 18 (01-16-24 @ 13:57) (18 - 20)  SpO2: 94% (01-16-24 @ 13:57) (94% - 94%)  Wt(kg): --    I&O's Summary    HEENT:  (-)icterus (-)pallor  CV: N S1 S2 1/6 DANNY (+)2 Pulses B/l  Resp:  Clear to ausculatation B/L, normal effort  GI: (+) BS Soft, NT, ND  Lymph:  (-)Edema, (-)obvious lymphadenopathy  Skin: Warm to touch, Normal turgor  Psych: Unable to adequately assess mood and affect    ASSESSMENT/PLAN: 	83y Male  PMHx of BPH w/ urinary retention, stroke (2013), HTN, DM, afib not on ac, dementia, erectile dysfunction, vertigo, leukocytosis of unknown origin (currently being worked up at [facility] Dr. 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    # Abnormal EKG  - Appear to have wondering atrial pacemaker  - tachy overnight, no complaints of palpitation   - ECHO noted , normal LV fx  - No need for further inpatient cardiac work up.    # Fall  - No evidence of LOC    # Leukocytosis  - Heme/onc f/u  - Surgery f/u noted  - for potential hospice placement     Fredi Wilson MD, [facility]  BEEPER [phone]     DATE OF SERVICE: 01-16-24    remains solmenent     aspirin  chewable 81 milliGRAM(s) Oral daily  chlorhexidine 2% Cloths 1 Application(s) Topical <User Schedule>  dextrose 5%. 1000 milliLiter(s) IV Continuous <Continuous>  donepezil 5 milliGRAM(s) Oral at bedtime  enoxaparin Injectable 40 milliGRAM(s) SubCutaneous every 24 hours  LORazepam   Injectable 1 milliGRAM(s) IV Push every 6 hours PRN  melatonin 3 milliGRAM(s) Oral at bedtime  morphine  - Injectable 0.5 milliGRAM(s) IV Push every 4 hours PRN  polyethylene glycol 3350 17 Gram(s) Oral daily PRN  senna 2 Tablet(s) Oral at bedtime PRN  tamsulosin 0.4 milliGRAM(s) Oral at bedtime                            9.1    54.83 )-----------( 398      ( 16 Jan 2024 06:30 )             31.4       Hemoglobin: 9.1 g/dL (01-16 @ 06:30)  Hemoglobin: 10.7 g/dL (01-15 @ 08:00)      01-16    148<H>  |  118<H>  |  27<H>  ----------------------------<  125<H>  4.0   |  27  |  0.80    Ca    10.9<H>      16 Jan 2024 06:30    TPro  6.0  /  Alb  1.4<L>  /  TBili  0.8  /  DBili  x   /  AST  25  /  ALT  18  /  AlkPhos  178<H>  01-16    Creatinine Trend: 0.80<--, 0.92<--, 0.91<--, 0.82<--, 0.90<--, 0.99<--    COAGS:           T(C): 35.7 (01-16-24 @ 13:57), Max: 36.4 (01-15-24 @ 22:00)  HR: 68 (01-16-24 @ 13:57) (65 - 72)  BP: 110/42 (01-16-24 @ 13:57) (108/46 - 131/72)  RR: 18 (01-16-24 @ 13:57) (18 - 20)  SpO2: 94% (01-16-24 @ 13:57) (94% - 94%)  Wt(kg): --    I&O's Summary    HEENT:  (-)icterus (-)pallor  CV: N S1 S2 1/6 DANNY (+)2 Pulses B/l  Resp:  Clear to ausculatation B/L, normal effort  GI: (+) BS Soft, NT, ND  Lymph:  (-)Edema, (-)obvious lymphadenopathy  Skin: Warm to touch, Normal turgor  Psych: Unable to adequately assess mood and affect    ASSESSMENT/PLAN: 	83y Male  PMHx of BPH w/ urinary retention, stroke (2013), HTN, DM, afib not on ac, dementia, erectile dysfunction, vertigo, leukocytosis of unknown origin (currently being worked up at [facility] Dr. 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    # Abnormal EKG  - Appear to have wondering atrial pacemaker  - tachy overnight, no complaints of palpitation   - ECHO noted , normal LV fx  - No need for further inpatient cardiac work up.    # Fall  - No evidence of LOC    # Leukocytosis  - Heme/onc f/u  - Surgery f/u noted  - for potential hospice placement     Fredi Wilson MD, [facility]  BEEPER [phone]

## 2024-01-16 NOTE — PROGRESS NOTE ADULT - PROBLEM SELECTOR PLAN 2
H/o  mechanical fall outpt  - PT recommended ABRAHAM.  Possibly LTC w/ Hospice. H/o Leukocytosis, follows w/ Dr. Pugh @ [facility], with negative outpatient leukemia workup  - S/p CT Abd & Pelvis showed right thigh mass  - S/p CT Chest showed extensive progressive metastatic burden to the lungs  - [facility]-Dr. Sage consulted, appreciated-not a candidate for excisional biopsy  - S/p IR for core biopsy of thigh and pt unable to tolerate MR despite Ativan prior  - [facility]-Dr. Flannery consulted, appreciated   - Palliative following for dc planning H/o Leukocytosis, follows w/ Dr. Pugh @ [facility], with negative outpatient leukemia workup  - S/p CT Abd & Pelvis showed right thigh mass  - S/p CT Chest showed extensive progressive metastatic burden to the lungs  - [facility]-Dr. Sage consulted, appreciated-not a candidate for excisional biopsy  - S/p IR for core biopsy of thigh and pt unable to tolerate MR despite Ativan prior  - [facility]-Dr. Flannery consulted, appreciated   - Palliative following for dc planning

## 2024-01-16 NOTE — PROGRESS NOTE ADULT - NS ATTEND AMEND GEN_ALL_CORE FT
# 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  - stage 4 cancer likely  # Lung nodules suspicious of malignancy   # Enlarged prostate, concerning for malignancy   # Leukocytosis, likely due to malignancy, ruled out leukemia with BM biopsy    # Anemia   # Delirium, possible hospital onset delirium; unlikely infection, CT head neg, electrolytes stable- resolved   # Hypercalcemia of malignancy   # Renal complex mass   # Abnormal EKG, appearing  2/2 wondering atrial pacemaker   # Atrial fibrillation not on AC   # hx of CVA, cerebellar infarct    # Emphysema    # Severe protein calorie malnutrition     patient somnolent not responding to verbal commands. per report unable to get MRI, and not a surgical candidate. given poor prognosis, multiple comorbidities and advance age unlikely to tolerate chemo. will not recommend biospy at this time. Case d/w Pall care- Dr. Rodas twice today- plan for LTC hospice if family agreeable. feeding assits. c/w D5W  -aspiration precautions

## 2024-01-16 NOTE — PROGRESS NOTE ADULT - SUBJECTIVE AND OBJECTIVE BOX
NP Note discussed with  primary attending    Patient is a 83y old  Male who presents with a chief complaint of frequent falls, weakness (16 Jan 2024 14:35)      INTERVAL HPI/OVERNIGHT EVENTS: Pt sleeping, aroused to touch.  Limited exam in lethargic pt.      MEDICATIONS  (STANDING):  aspirin  chewable 81 milliGRAM(s) Oral daily  chlorhexidine 2% Cloths 1 Application(s) Topical <User Schedule>  dextrose 5%. 1000 milliLiter(s) (75 mL/Hr) IV Continuous <Continuous>  donepezil 5 milliGRAM(s) Oral at bedtime  enoxaparin Injectable 40 milliGRAM(s) SubCutaneous every 24 hours  melatonin 3 milliGRAM(s) Oral at bedtime  tamsulosin 0.4 milliGRAM(s) Oral at bedtime    MEDICATIONS  (PRN):  LORazepam   Injectable 1 milliGRAM(s) IV Push every 6 hours PRN Agitation  morphine  - Injectable 0.5 milliGRAM(s) IV Push every 4 hours PRN Severe Pain (7 - 10)  polyethylene glycol 3350 17 Gram(s) Oral daily PRN Constipation  senna 2 Tablet(s) Oral at bedtime PRN Constipation      __________________________________________________  REVIEW OF SYSTEMS:  Limited exam in lethargic pt      CONSTITUTIONAL: No fever  EYES: No acute visual disturbances  NECK: No pain or stiffness  RESPIRATORY: No cough; No shortness of breath  CARDIOVASCULAR: No chest pain, no palpitations  GASTROINTESTINAL: No pain. No nausea or vomiting.  No diarrhea   NEUROLOGICAL: No headache or numbness, no tremors  MUSCULOSKELETAL: No joint pain, no muscle pain  GENITOURINARY: No dysuria, no frequency, no hesitancy  PSYCHIATRY: No depression , no anxiety  ALL OTHER  ROS negative        Vital Signs Last 24 Hrs  T(C): 35.7 (16 Jan 2024 13:57), Max: 36.4 (15 Rich 2024 22:00)  T(F): 96.3 (16 Jan 2024 13:57), Max: 97.6 (16 Jan 2024 05:16)  HR: 68 (16 Jan 2024 13:57) (65 - 72)  BP: 110/42 (16 Jan 2024 13:57) (108/46 - 131/72)  RR: 18 (16 Jan 2024 13:57) (18 - 20)  SpO2: 94% (16 Jan 2024 13:57) (94% - 94%)    Parameters below as of 16 Jan 2024 13:57  Patient On (Oxygen Delivery Method): room air        ________________________________________________  PHYSICAL EXAM:  Limited exam in lethargic pt.    GENERAL: NAD, frail, ill appearing  HEENT: Normocephalic; conjunctivae and sclerae clear; Dry mucous membranes   NECK : Supple  CHEST/LUNG: Clear to auscultation bilaterally with good air entry   HEART: S1 S2  regular; no murmurs, gallops or rubs  ABDOMEN: Soft, Nontender, Nondistended; Bowel sounds present x 4 quad  EXTREMITIES: No cyanosis; no edema; no calf tenderness  SKIN: Warm and dry; no rash  NERVOUS SYSTEM:  Lethargic, aroused to touch   _________________________________________________  LABS:                        9.1    54.83 )-----------( 398      ( 16 Jan 2024 06:30 )             31.4     01-16    148<H>  |  118<H>  |  27<H>  ----------------------------<  125<H>  4.0   |  27  |  0.80    Ca    10.9<H>      16 Jan 2024 06:30    TPro  6.0  /  Alb  1.4<L>  /  TBili  0.8  /  DBili  x   /  AST  25  /  ALT  18  /  AlkPhos  178<H>  01-16      Urinalysis Basic - ( 16 Jan 2024 06:30 )    Color: x / Appearance: x / SG: x / pH: x  Gluc: 125 mg/dL / Ketone: x  / Bili: x / Urobili: x   Blood: x / Protein: x / Nitrite: x   Leuk Esterase: x / RBC: x / WBC x   Sq Epi: x / Non Sq Epi: x / Bacteria: x      CAPILLARY BLOOD GLUCOSE            RADIOLOGY & ADDITIONAL TESTS:    Imaging Personally Reviewed:  YES    Consultant(s) Notes Reviewed:   YES    Care Discussed with Consultants :     Plan of care was discussed with patient and /or primary care giver; all questions and concerns were addressed and care was aligned with patient's wishes.

## 2024-01-16 NOTE — PROGRESS NOTE ADULT - ASSESSMENT
83 y.o M w/ PMHx of BPH w/ urinary retention, stroke (2013), HTN, DM, afib not on A/C, dementia (on home Aricept), erectile dysfunction, vertigo, leukocytosis of unknown origin (currently followed by Dr. 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  Initial ER labs were notable for WBC of 47, albumin of 1.7, and hypercalcemia (corrected calcium of 13).  CXR showed scattered small pulmonary infiltrates, no fractures identified on additional x-ray imaging, CT of head was negative for acute intracranial infarct/hemorrhage/mass  (+ minimal anterior periventricular white matter ischemic changes).      Patient was admitted for ambulatory dysfunction, hypercalcemia, and malignancy work-up.  Heme/Onc consulted; per their notes patient with previous BM biopsy in Dec. 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  CT of chest/abd/pelvis (1/7) showed increasing metastatic disease burden to the lungs.  Surgical Oncology consulted, but patient no longer considered a candidate for excisional biopsy of leg mass due to worsening functional/mental status.  IR consulted for possible core biopsy of R thigh mass, but patient unable to tolerate MRI yesterday 1/9 despite sedation with IV Ativan, possible IR mass of lung biopsy now being considered.  Palliative Care service consulted for medical decision making and additional GOC discussion.

## 2024-01-16 NOTE — PROGRESS NOTE ADULT - SUBJECTIVE AND OBJECTIVE BOX
follow up on:  complex medical decision making related to goals of care    [facility] Geriatric and Palliative Consult Service:  Tamela Vasquez DO: cell ([phone])  Raf Rodas MD: cell ([phone])  Benito Osorio NP: cell ([phone])   Toni Rico LMSW: cell ([phone])   Sharon Rousseau NP: via [employer] Teams    Can contact any Palliative Team member via [employer] Teams for consults and questions      OVERNIGHT EVENTS:    Present Symptoms: Mild, Moderate, Severe  Pain:             Location -                               Aggravating factors -             Quality -             Radiation -             Timing-             Severity (0-10 scale):             Minimal acceptable level (0-10 scale):  Fatigue:  Nausea:  Lack of Appetite:   SOB:  Depression:  Anxiety:  Review of Systems: [All others negative or Unable to obtain due to poor mentation]    CPOT:    https://ccs-st.org/resources/Documents/Sedation%20Analgesia%20Delirium%20in%20CC/CCS%20STH%20Guideline%20template%20CPOT.pdf  PAIN AD Score:   http://geriatrictoolkit.[facility]/cog/painad.pdf (press ctrl +  left click to view)MEDICATIONS  (STANDING):  aspirin  chewable 81 milliGRAM(s) Oral daily  chlorhexidine 2% Cloths 1 Application(s) Topical <User Schedule>  dextrose 5%. 1000 milliLiter(s) (75 mL/Hr) IV Continuous <Continuous>  donepezil 5 milliGRAM(s) Oral at bedtime  enoxaparin Injectable 40 milliGRAM(s) SubCutaneous every 24 hours  melatonin 3 milliGRAM(s) Oral at bedtime  tamsulosin 0.4 milliGRAM(s) Oral at bedtime    MEDICATIONS  (PRN):  LORazepam   Injectable 1 milliGRAM(s) IV Push every 6 hours PRN Agitation  morphine  - Injectable 0.5 milliGRAM(s) IV Push every 4 hours PRN Severe Pain (7 - 10)  polyethylene glycol 3350 17 Gram(s) Oral daily PRN Constipation  senna 2 Tablet(s) Oral at bedtime PRN Constipation      PHYSICAL EXAM:  Vital Signs Last 24 Hrs  T(C): 35.7 (16 Jan 2024 13:57), Max: 36.4 (15 Rich 2024 22:00)  T(F): 96.3 (16 Jan 2024 13:57), Max: 97.6 (16 Jan 2024 05:16)  HR: 68 (16 Jan 2024 13:57) (65 - 72)  BP: 110/42 (16 Jan 2024 13:57) (108/46 - 131/72)  BP(mean): --  RR: 18 (16 Jan 2024 13:57) (18 - 20)  SpO2: 94% (16 Jan 2024 13:57) (94% - 94%)    Parameters below as of 16 Jan 2024 13:57  Patient On (Oxygen Delivery Method): room air        General: alert  oriented x ____    lethargic distressed cachexia  verbal nonverbal  unarousable     Palliative Performance Scale/Karnofsky Score:  http://npcrc.org/files/news/palliative_performance_scale_ppsv2.pdf    HEENT: no abnormal lesion, dry mouth  ET tube/trach oral lesions:  Lungs: tachypnea/labored breathing, audible excessive secretions  CV: RRR, S1S2, tachycardia  GI: soft non distended non tender  incontinent               PEG/NG/OG tube  constipation  last BM:   : incontinent  oliguria/anuria  mckeon  Musculoskeletal: weakness x4 edema x4    ambulatory with assistance   mostly/fully bedbound/wheelchair bound  Skin: no abnormal skin lesions, poor skin turgor, pressure ulcers stage:   Neuro: no deficits, mild cognitive impairment dsyphagia/dysarthria paresis  Oral intake ability: unable/only mouth care, minimal moderate full capability    LABS:                          9.1    54.83 )-----------( 398      ( 16 Jan 2024 06:30 )             31.4     01-16    148<H>  |  118<H>  |  27<H>  ----------------------------<  125<H>  4.0   |  27  |  0.80    Ca    10.9<H>      16 Jan 2024 06:30    TPro  6.0  /  Alb  1.4<L>  /  TBili  0.8  /  DBili  x   /  AST  25  /  ALT  18  /  AlkPhos  178<H>  01-16    Urinalysis Basic - ( 16 Jan 2024 06:30 )    Color: x / Appearance: x / SG: x / pH: x  Gluc: 125 mg/dL / Ketone: x  / Bili: x / Urobili: x   Blood: x / Protein: x / Nitrite: x   Leuk Esterase: x / RBC: x / WBC x   Sq Epi: x / Non Sq Epi: x / Bacteria: x        RADIOLOGY & ADDITIONAL STUDIES: follow up on:  complex medical decision making related to goals of care    [facility] Geriatric and Palliative Consult Service:  Tamela Vasquez DO: cell ([phone])  Raf Rodas MD: cell ([phone])  Benito Osorio NP: cell ([phone])   Toni Rico LMSW: cell ([phone])   Sharon Rousseau NP: via [employer] Teams    Can contact any Palliative Team member via [employer] Teams for consults and questions      OVERNIGHT EVENTS:  Required PRN IV morphine x 1 on 1/14 and 1/15 (in the evenings).  Otherwise none.      Patient examined this afternoon with wife at bedside.  Alert and oriented x zero, remains mostly lethargic with minimal verbal response.  Overall appears quite comfortable.  Continued very poor oral intake per wife and nursing staff.  No overt signs of pain or SOB presently.  Otherwise has no acute complaints.      Present Symptoms: Mild, Moderate, Severe  Pain:  Lethargic, unable to verbalize, CPOT 0                     Review of Systems:  Unable to obtain due to poor mentation/lethargy/dementia    CPOT:  0  https://ccs-st.org/resources/Documents/Sedation%20Analgesia%20Delirium%20in%20CC/CCS%20STH%20Guideline%20template%20CPOT.pdf      MEDICATIONS  (STANDING):  aspirin  chewable 81 milliGRAM(s) Oral daily  chlorhexidine 2% Cloths 1 Application(s) Topical <User Schedule>  dextrose 5%. 1000 milliLiter(s) (75 mL/Hr) IV Continuous <Continuous>  donepezil 5 milliGRAM(s) Oral at bedtime  enoxaparin Injectable 40 milliGRAM(s) SubCutaneous every 24 hours  melatonin 3 milliGRAM(s) Oral at bedtime  tamsulosin 0.4 milliGRAM(s) Oral at bedtime    MEDICATIONS  (PRN):  LORazepam   Injectable 1 milliGRAM(s) IV Push every 6 hours PRN Agitation  morphine  - Injectable 0.5 milliGRAM(s) IV Push every 4 hours PRN Severe Pain (7 - 10)  polyethylene glycol 3350 17 Gram(s) Oral daily PRN Constipation  senna 2 Tablet(s) Oral at bedtime PRN Constipation      PHYSICAL EXAM:  Vital Signs Last 24 Hrs  T(C): 35.7 (16 Jan 2024 13:57), Max: 36.4 (15 Rich 2024 22:00)  T(F): 96.3 (16 Jan 2024 13:57), Max: 97.6 (16 Jan 2024 05:16)  HR: 68 (16 Jan 2024 13:57) (65 - 72)  BP: 110/42 (16 Jan 2024 13:57) (108/46 - 131/72)  BP(mean): --  RR: 18 (16 Jan 2024 13:57) (18 - 20)  SpO2: 94% (16 Jan 2024 13:57) (94% - 94%)    Parameters below as of 16 Jan 2024 13:57  Patient On (Oxygen Delivery Method): room air        General: alert  oriented x __0__    lethargic   + mild temporal wasting, in NAD    Palliative Performance Scale/Karnofsky Score:  30%  http://[facility].org/files/news/palliative_performance_scale_ppsv2.pdf    HEENT:  anicteric, pharynx clear, MM sl dry  Lungs: clear to auscultation, respirations unlabored  CV: RRR,  normal S1 and S2, no murmur  GI: soft non distended non tender  + normal bowel sounds  : incontinent  oliguria/anuria  mckeon  Musculoskeletal:  weakness x4  in all extremities, essentially bedbound, ++ hard mass of R medial thigh, no edema noted  Skin: no abnormal skin lesions or DU noted  Neuro: no focal deficits, ++ severe cognitive impairment  + dysphagia   Oral intake ability:  minimal to moderate capability, on soft and bite sized diet      LABS:                          9.1    54.83 )-----------( 398      ( 16 Jan 2024 06:30 )             31.4     01-16    148<H>  |  118<H>  |  27<H>  ----------------------------<  125<H>  4.0   |  27  |  0.80    Ca    10.9<H>      16 Jan 2024 06:30    TPro  6.0  /  Alb  1.4<L>  /  TBili  0.8  /  DBili  x   /  AST  25  /  ALT  18  /  AlkPhos  178<H>  01-16    Urinalysis Basic - ( 16 Jan 2024 06:30 )    Color: x / Appearance: x / SG: x / pH: x  Gluc: 125 mg/dL / Ketone: x  / Bili: x / Urobili: x   Blood: x / Protein: x / Nitrite: x   Leuk Esterase: x / RBC: x / WBC x   Sq Epi: x / Non Sq Epi: x / Bacteria: x        RADIOLOGY & ADDITIONAL STUDIES:

## 2024-01-16 NOTE — PROGRESS NOTE ADULT - PROBLEM SELECTOR PLAN 1
CT with worsening metastatic disease to lungs  Has R thigh mass concerning for sarcoma, also with concern for primary prostate Ca  [facility] also discussed patient with Dr. Pugh previously by phone     Patient is currently ECOG 4 with poor functional status and worsening delirium/encephalopathy on top of presumed vascular dementia.  He is now essentially bedbound and has become increasingly obtunded.  He would likely now require IR biopsy of a lung nodule for tissue diagnosis (risks far outweigh benefits).  Even if a responsive tumor was found, I do not see how patient would be able to tolerate chemotherapy or any type of cancer directed treatment.  He is medically appropriate for hospice with likely prognosis of weeks to less than 2-3 months.  Remains low threshold for inpatient hospice/IPU    See GOC discussion from 1/10 and 1/12--wife unable to care for patient at home, wishes to consider LTC placement with hospice, exploring options, unable to make decisions about comfort care or code status at this time.  Discussed with wife at bedside again today, still no decision regarding code status, impressed upon her need to have family meeting with children (either in-person or by phone) tomorrow 1/17.  Wife voiced understanding.      Continue supportive care

## 2024-01-16 NOTE — PROGRESS NOTE ADULT - PROBLEM SELECTOR PLAN 2
Now with superimposed delirium/metabolic encephalopathy, likely multi-factorial in the setting of metastatic cancer and hypercalcemia.  Patient is currently bedbound and total care, equivalent to FAST stage 7A.  PPS 30%.  Hospice appropriate, low threshold for IPU    Continue supportive care.  Consider D/C donepezil given patient's end stage disease

## 2024-01-17 NOTE — PROGRESS NOTE ADULT - PROBLEM SELECTOR PLAN 7
As above.  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  ECOG 4  with PPS for 30%.  He is a poor candidate for any cancer directed treatment (systemic or otherwise).  Hospice appropriate with prognosis of weeks to  months.  Low threshold for IPU, although symptoms appear reasonably managed at this time    See additional GOC discussion above--wife now in agreement with DNR, DNI, DNH, and no PEG    Remainder of management as per primary medical team  Continue IV lorazepam PRN agitation and IV morphine PRN pain, will start Fentanyl patch 12 mcg  Ongoing collaboration with Pall Care team SW   D/C planning for LTC with hospice   Discussed with primary team and Dr. Payan in detail  Palliative Care team will continue to follow.

## 2024-01-17 NOTE — PROGRESS NOTE ADULT - SUBJECTIVE AND OBJECTIVE BOX
DATE OF SERVICE: 01-17-24    remains solmenent    aspirin  chewable 81 milliGRAM(s) Oral daily  chlorhexidine 2% Cloths 1 Application(s) Topical <User Schedule>  dextrose 5%. 1000 milliLiter(s) IV Continuous <Continuous>  donepezil 5 milliGRAM(s) Oral at bedtime  enoxaparin Injectable 40 milliGRAM(s) SubCutaneous every 24 hours  LORazepam   Injectable 1 milliGRAM(s) IV Push every 6 hours PRN  melatonin 3 milliGRAM(s) Oral at bedtime  morphine  - Injectable 0.5 milliGRAM(s) IV Push every 4 hours PRN  polyethylene glycol 3350 17 Gram(s) Oral daily PRN  senna 2 Tablet(s) Oral at bedtime PRN  tamsulosin 0.4 milliGRAM(s) Oral at bedtime                            8.2    46.92 )-----------( 344      ( 17 Jan 2024 07:10 )             27.8       Hemoglobin: 8.2 g/dL (01-17 @ 07:10)  Hemoglobin: 9.1 g/dL (01-16 @ 06:30)  Hemoglobin: 10.7 g/dL (01-15 @ 08:00)      01-17    148<H>  |  116<H>  |  24<H>  ----------------------------<  115<H>  3.2<L>   |  29  |  0.96    Ca    10.7<H>      17 Jan 2024 07:10  Phos  2.8     01-17  Mg     2.2     01-17    TPro  5.3<L>  /  Alb  1.2<L>  /  TBili  0.8  /  DBili  x   /  AST  10  /  ALT  15  /  AlkPhos  154<H>  01-17    Creatinine Trend: 0.96<--, 0.80<--, 0.92<--, 0.91<--, 0.82<--, 0.90<--    COAGS:           T(C): 36.7 (01-17-24 @ 05:20), Max: 36.7 (01-17-24 @ 05:20)  HR: 78 (01-17-24 @ 05:20) (56 - 78)  BP: 113/69 (01-17-24 @ 05:20) (110/42 - 122/50)  RR: 18 (01-17-24 @ 05:20) (18 - 18)  SpO2: 97% (01-17-24 @ 05:20) (94% - 97%)  Wt(kg): --    I&O's Summary      HEENT:  (-)icterus (-)pallor  CV: N S1 S2 1/6 DANNY (+)2 Pulses B/l  Resp:  Clear to ausculatation B/L, normal effort  GI: (+) BS Soft, NT, ND  Lymph:  (-)Edema, (-)obvious lymphadenopathy  Skin: Warm to touch, Normal turgor  Psych: Unable to adequately assess mood and affect    ASSESSMENT/PLAN: 	83y Male  PMHx of BPH w/ urinary retention, stroke (2013), HTN, DM, afib not on ac, dementia, erectile dysfunction, vertigo, leukocytosis of unknown origin (currently being worked up at [facility] Dr. 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    # Abnormal EKG  - Appear to have wondering atrial pacemaker  - tachy overnight, no complaints of palpitation   - ECHO noted , normal LV fx  - No need for further inpatient cardiac work up.    # Fall  - No evidence of LOC    # Leukocytosis  - Heme/onc f/u  - Surgery f/u noted    # GOC  - for potential hospice placement   - awaiting family decision    Fredi Wilson MD, [facility]  BEEPER [phone]

## 2024-01-17 NOTE — PROGRESS NOTE ADULT - PROBLEM SELECTOR PLAN 4
H/o Leukocytosis, follows w/ Dr. Pugh @ [facility], with negative outpatient leukemia workup  - P/w WBC 47.92 on admission uptrended to 57  - Likely reactive in s/o malignancy   - Continue to monitor CBC in AM-f/u results   - [facility]-Dr. Flannery consulted, appreciated.  Bone marrow bx in November 2023 was unremarkable with no evidence of hematological malignancy.

## 2024-01-17 NOTE — PROGRESS NOTE ADULT - ASSESSMENT
83 year old, Male w/ PMH of BPH w/ urinary retention, stroke (2013), emphysema, afib (not on ac), dementia, erectile dysfunction, vertigo, leukocytosis of unknown origin (currently being worked up at [facility] Dr. 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  presented s/p fall on the way to his Cardiologist.  Admitted for Ambulatory dysfunction and malignancy workup.      Palliative following.  Pt DNR/DNI.

## 2024-01-17 NOTE — PROGRESS NOTE ADULT - PROBLEM SELECTOR PLAN 1
CT with worsening metastatic disease to lungs  Has R thigh mass concerning for sarcoma, also with concern for primary prostate Ca  [facility]; also discussed patient with Dr. Pugh previously by phone     Patient is currently ECOG 4 with poor functional status and worsening delirium/encephalopathy on top of presumed vascular dementia.  He is now essentially bedbound and has become increasingly obtunded.  He would likely now require IR biopsy of a lung nodule for tissue diagnosis (risks far outweigh benefits).  Even if a responsive tumor was found, I do not see how patient would be able to tolerate chemotherapy or any type of cancer directed treatment.  He is medically appropriate for hospice with likely prognosis of weeks to less than 2-3 months.  Remains low threshold for inpatient hospice/IPU (does not meet IPU criteria at this time).    See GOC discussion from 1/10, 1/12, and above-- wife now in agreement for DNR, DNI, DNH, and no PEG  Continue with D/C planning for LTC with hospice  Continue supportive care

## 2024-01-17 NOTE — PROGRESS NOTE ADULT - SUBJECTIVE AND OBJECTIVE BOX
follow up on:  complex medical decision making related to goals of care    [facility] Geriatric and Palliative Consult Service:  Tamela Vasquez DO: cell ([phone])  Raf Rodas MD: cell ([phone])  Benito Osorio NP: cell ([phone])   Toni Rico LMSW: cell ([phone])   Sharon Rousseau NP: via [employer]    Can contact any Palliative Team member via [employer] Teams for consults and questions      OVERNIGHT EVENTS:    Present Symptoms: Mild, Moderate, Severe  Pain:             Location -                               Aggravating factors -             Quality -             Radiation -             Timing-             Severity (0-10 scale):             Minimal acceptable level (0-10 scale):  Fatigue:  Nausea:  Lack of Appetite:   SOB:  Depression:  Anxiety:  Review of Systems: [All others negative or Unable to obtain due to poor mentation]    CPOT:    https://ccs-st.org/resources/Documents/Sedation%20Analgesia%20Delirium%20in%20CC/CCS%20STH%20Guideline%20template%20CPOT.pdf  PAIN AD Score:   http://geriatrictoolkit.[email or web address]/cog/painad.pdf (press ctrl +  left click to view)MEDICATIONS  (STANDING):  aspirin  chewable 81 milliGRAM(s) Oral daily  chlorhexidine 2% Cloths 1 Application(s) Topical <User Schedule>  dextrose 5%. 1000 milliLiter(s) (75 mL/Hr) IV Continuous <Continuous>  enoxaparin Injectable 40 milliGRAM(s) SubCutaneous every 24 hours  fentaNYL   Patch  12 MICROgram(s)/Hr 1 Patch Transdermal every 72 hours  melatonin 3 milliGRAM(s) Oral at bedtime  tamsulosin 0.4 milliGRAM(s) Oral at bedtime    MEDICATIONS  (PRN):  LORazepam   Injectable 1 milliGRAM(s) IV Push every 6 hours PRN Agitation  morphine  - Injectable 0.5 milliGRAM(s) IV Push every 4 hours PRN Severe Pain (7 - 10)      PHYSICAL EXAM:  Vital Signs Last 24 Hrs  T(C): 36.3 (17 Jan 2024 20:12), Max: 36.7 (17 Jan 2024 05:20)  T(F): 97.3 (17 Jan 2024 20:12), Max: 98.1 (17 Jan 2024 05:20)  HR: 68 (17 Jan 2024 20:12) (68 - 78)  BP: 103/41 (17 Jan 2024 20:12) (103/41 - 123/63)  BP(mean): --  RR: 16 (17 Jan 2024 20:12) (16 - 18)  SpO2: 92% (17 Jan 2024 20:12) (92% - 97%)    Parameters below as of 17 Jan 2024 20:12  Patient On (Oxygen Delivery Method): room air        General: alert  oriented x ____    lethargic distressed cachexia  verbal nonverbal  unarousable     Palliative Performance Scale/Karnofsky Score:  http://npcrc.org/files/news/palliative_performance_scale_ppsv2.pdf    HEENT: no abnormal lesion, dry mouth  ET tube/trach oral lesions:  Lungs: tachypnea/labored breathing, audible excessive secretions  CV: RRR, S1S2, tachycardia  GI: soft non distended non tender  incontinent               PEG/NG/OG tube  constipation  last BM:   : incontinent  oliguria/anuria  mckeon  Musculoskeletal: weakness x4 edema x4    ambulatory with assistance   mostly/fully bedbound/wheelchair bound  Skin: no abnormal skin lesions, poor skin turgor, pressure ulcers stage:   Neuro: no deficits, mild cognitive impairment dsyphagia/dysarthria paresis  Oral intake ability: unable/only mouth care, minimal moderate full capability    LABS:                          8.2    46.92 )-----------( 344      ( 17 Jan 2024 07:10 )             27.8     01-17    148<H>  |  116<H>  |  24<H>  ----------------------------<  115<H>  3.2<L>   |  29  |  0.96    Ca    10.7<H>      17 Jan 2024 07:10  Phos  2.8     01-17  Mg     2.2     01-17    TPro  5.3<L>  /  Alb  1.2<L>  /  TBili  0.8  /  DBili  x   /  AST  10  /  ALT  15  /  AlkPhos  154<H>  01-17    Urinalysis Basic - ( 17 Jan 2024 07:10 )    Color: x / Appearance: x / SG: x / pH: x  Gluc: 115 mg/dL / Ketone: x  / Bili: x / Urobili: x   Blood: x / Protein: x / Nitrite: x   Leuk Esterase: x / RBC: x / WBC x   Sq Epi: x / Non Sq Epi: x / Bacteria: x        RADIOLOGY & ADDITIONAL STUDIES: follow up on:  complex medical decision making related to goals of care    [facility] Geriatric and Palliative Consult Service:  Tamela Vasquez DO: cell ([phone])  Raf Rodas MD: cell ([phone])  Benito Osorio NP: cell ([phone])   Toni Rico LMSW: cell ([phone])   Sharon Rousseau NP: via [employer] Teams    Can contact any Palliative Team member via [employer] Teams for consults and questions      OVERNIGHT EVENTS:  Received PRN IV morphine x 1 on 1/16, and PRN IV lorazepam x 1 early this AM.  Otherwise none.    Patient examined at bedside earlier today.  Remains alert and oriented x zero, lethargic, minimally responsive.  Overall appears comfortable but with easy grimacing/moaning upon physical exam (especially of legs).  No overt signs of respiratory distress.  Continued poor p.o. intake per nursing staff.  No other acute issues reported.    Present Symptoms: Mild, Moderate, Severe  Pain:  Unable to verbalize, CPOT  3                            Review of Systems:  Unable to obtain due to poor mentation/lethargy    CPOT:  3  https://ccs-st.org/resources/Documents/Sedation%20Analgesia%20Delirium%20in%20CC/CCS%20STH%20Guideline%20template%20CPOT.pdf    MEDICATIONS  (STANDING):  aspirin  chewable 81 milliGRAM(s) Oral daily  chlorhexidine 2% Cloths 1 Application(s) Topical <User Schedule>  dextrose 5%. 1000 milliLiter(s) (75 mL/Hr) IV Continuous <Continuous>  enoxaparin Injectable 40 milliGRAM(s) SubCutaneous every 24 hours  fentaNYL   Patch  12 MICROgram(s)/Hr 1 Patch Transdermal every 72 hours  melatonin 3 milliGRAM(s) Oral at bedtime  tamsulosin 0.4 milliGRAM(s) Oral at bedtime    MEDICATIONS  (PRN):  LORazepam   Injectable 1 milliGRAM(s) IV Push every 6 hours PRN Agitation  morphine  - Injectable 0.5 milliGRAM(s) IV Push every 4 hours PRN Severe Pain (7 - 10)      PHYSICAL EXAM:  Vital Signs Last 24 Hrs  T(C): 36.3 (17 Jan 2024 20:12), Max: 36.7 (17 Jan 2024 05:20)  T(F): 97.3 (17 Jan 2024 20:12), Max: 98.1 (17 Jan 2024 05:20)  HR: 68 (17 Jan 2024 20:12) (68 - 78)  BP: 103/41 (17 Jan 2024 20:12) (103/41 - 123/63)  BP(mean): --  RR: 16 (17 Jan 2024 20:12) (16 - 18)  SpO2: 92% (17 Jan 2024 20:12) (92% - 97%)    Parameters below as of 17 Jan 2024 20:12  Patient On (Oxygen Delivery Method): room air        General: alert  oriented x __0__    lethargic  cachectic with temporal wasting, NAD    Palliative Performance Scale/Karnofsky Score:  20%  http://npcrc.org/files/news/palliative_performance_scale_ppsv2.pdf    HEENT:  EOMI anicteric, pharynx clear, MM sl dry  Lungs:  clear to auscultation, respirations unlabored  CV: RRR,  normal S1 and S2, no murmur  GI: soft non distended non tender + normal bowel sounds  : incontinent    Musculoskeletal:  weakness x4  in all extremities, essentially bedbound, ++ hard mass of R medial thigh, ++ tenderness upon palpation of LE bilaterally, no edema  Skin: no abnormal skin lesions or DU noted  Neuro: no focal deficits, ++ severe cognitive impairment  + dysphagia   Oral intake ability:  minimal to moderate capability, on soft and bite sized diet      LABS:                          8.2    46.92 )-----------( 344      ( 17 Jan 2024 07:10 )             27.8     01-17    148<H>  |  116<H>  |  24<H>  ----------------------------<  115<H>  3.2<L>   |  29  |  0.96    Ca    10.7<H>      17 Jan 2024 07:10  Phos  2.8     01-17  Mg     2.2     01-17    TPro  5.3<L>  /  Alb  1.2<L>  /  TBili  0.8  /  DBili  x   /  AST  10  /  ALT  15  /  AlkPhos  154<H>  01-17    Urinalysis Basic - ( 17 Jan 2024 07:10 )    Color: x / Appearance: x / SG: x / pH: x  Gluc: 115 mg/dL / Ketone: x  / Bili: x / Urobili: x   Blood: x / Protein: x / Nitrite: x   Leuk Esterase: x / RBC: x / WBC x   Sq Epi: x / Non Sq Epi: x / Bacteria: x        RADIOLOGY & ADDITIONAL STUDIES:

## 2024-01-17 NOTE — PROGRESS NOTE ADULT - PROBLEM SELECTOR PLAN 4
Wife now in agreement with Fentanyl patch, will start at 12 mcg  Continue IV morphine 0.5 mg Q 4 hrs PRN, titrate as needed    Bowel regimen in place with Senna and Dulcolax, may need to consider Dulcolax supp PRN

## 2024-01-17 NOTE — PROGRESS NOTE ADULT - CONVERSATION DETAILS
Met with the pt's wife at the bedside to discuss the GOC. We reviewed the pmhx, recent events and hospital course. She stated he has been weaker for the last year with significant wt loss, noticed the thigh mass in November, had a lot of falls recently PTA. Educated extensively on the malignancy trajectory and the benefits and burdens of LST including PEG tube, CPR/intubation and mechanical ventilation. Educated on DNR/allow natural death at length. EOL issues discussed extensively and educated on hospice philosophy and locations of care. She verbalized understanding of the poor prognosis in the setting of metastatic disease and that her children aren't ready to "give up" and that there are some complicated family dynamics with the pt's children from his first marriage. Much support provided. She is now agreeable to morphine for pain control. Not ready to contemplate MOLST form and encouraged her to continue to discuss with her children/family.
Additional face to face family meeting held with wife Sadie at bedside x 25 minutes (as separately identifiable service) to discuss prognosis, ACP, goals of care, and treatment preferences.  Spouse reported that she spoke with her children overnight and that they are now all in agreement for DNR and DNI.  After further discussion, wife also agreed with orders for DNH and no PEG/feeding tube; new MOLST form completed and placed in chart.  Spouse stated that she is still working on finding a facility for LTC placement with hospice; discussed that this still appears to be the most reasonable discharge plan as patient does not meet IPU criteria at this time (and overall appears relatively comfortable on current medication regimen).  Discussed possible transition to comfort measures only, including no further labs, no further testing, correction of electrolytes etc, but wife not ready to make decision for total comfort care at this time.  Discussed that patient would benefit from additional opioids for long acting pain control, wife agreed to initiation of Fentanyl patch with close monitoring, does not want further titration of IV morphine at this time.  Wife was appreciative of the conversation, and all of her questions were answered.
Additional face to face family meeting held with wife at bedside x 30 minutes (as separately identifiable service) to discuss prognosis, ACP, goals of care, and treatment preferences).  Palliative Care team SW also joined in the conversation.  Counseled wife that patient's symptoms, delirium, and agitation appear to have progressed over the past 48 hours, and that we may be approaching the point where we may need to consider transitioning to comfort oriented care only, including possible consideration of inpatient hospice/IPU level of care.  Wife unwilling to consider comfort oriented care at this time, remains overwhelmed by process of choosing LTC facility with hospice, as well as continued emotional overwhelm due to multiple psychosocial stressors (see my previous note from 1/10).  Advised wife that patient is clearly in pain and would benefit from starting PRN low dose opioids at this time for pain management, but she refused, asking "are we really at that point yet?".  Wife wishes to continue PRN Tylenol for pain management, advised her that this is likely to be inadequate, asked her to reconsider starting opioids for the future.      In regards to advance directives/LST, again counseled wife regarding risks/benefits/burdens of allowing natural death vs full resuscitation, advised her that patient unlikely to survive cardiac event requiring CPR at expense of significant harms/burdens etc.  She remains unable to make a decision about code status at this time, patient to remain Full Code.  Offered to reach out to her children for additional GOC/decision making support. Wife initially refused, but then stated that she would ask her daughter to come in with her for additional in-person discussions, which we encouraged her to do so over the next few days.  All of the wife's questions were answered.

## 2024-01-17 NOTE — PROGRESS NOTE ADULT - PROBLEM SELECTOR PLAN 1
- Pt lethargic with decreased PO intake  - Attending requested feeding assist.  Placed on calorie count.    - C/w D5  - CMP in AM-f/u results   - Palliative following for dc planning

## 2024-01-17 NOTE — PROGRESS NOTE ADULT - CONVERSATION/DISCUSSION
Diagnosis/Prognosis/MOLST Discussed
Diagnosis/Prognosis/MOLST Discussed/Treatment Options
Diagnosis/Prognosis/MOLST Discussed/Treatment Options

## 2024-01-17 NOTE — PROGRESS NOTE ADULT - NS ATTEND AMEND GEN_ALL_CORE FT
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 Rodas- patient started on fentanyl pathch and made DNR/DNI- Plan for LTC hospice, will c/w D5., stop bowel regimen and aricept  -will not pursue labs further to provide comfort and avoid unnecessary sticks.  patient somnolent not responding to verbal commands. per report unable to get MRI, and not a surgical candidate. given poor prognosis, multiple comorbidities and advance age unlikely to tolerate chemo. will not recommend biospy at this time.   -aspiration precautions.

## 2024-01-17 NOTE — PROGRESS NOTE ADULT - PROBLEM SELECTOR PROBLEM 8
Problem: Infection - Risk of, Surgical Site Infection  Goal: *Absence of surgical site infection signs and symptoms  Outcome: Progressing Towards Goal     Problem: Patient Education: Go to Patient Education Activity  Goal: Patient/Family Education  Outcome: Progressing Towards Goal     Problem: Deep Venous Thrombosis - Risk of  Goal: *Absence of deep venous thrombosis signs and symptoms(Stroke Metric)  Outcome: Progressing Towards Goal     Problem: Deep Venous Thrombosis - Risk of  Goal: *Absence of impaired coagulation signs and symptoms  Outcome: Progressing Towards Goal     Problem: Deep Venous Thrombosis - Risk of  Goal: *Knowledge of prescribed medications  Outcome: Progressing Towards Goal     Problem: Deep Venous Thrombosis - Risk of  Goal: *Absence of bleeding  Outcome: Progressing Towards Goal     Problem: Falls - Risk of  Goal: *Absence of Falls  Description  Document H[place]tlyssa Navas Fall Risk and appropriate interventions in the flowsheet. Outcome: Progressing Towards Goal  Note: Fall Risk Interventions:  Mobility Interventions: Bed/chair exit alarm, Communicate number of staff needed for ambulation/transfer, Patient to call before getting OOB, Strengthening exercises (ROM-active/passive)         Medication Interventions: Bed/chair exit alarm, Evaluate medications/consider consulting pharmacy, Patient to call before getting OOB, Teach patient to arise slowly    Elimination Interventions: Bed/chair exit alarm, Call light in reach, Patient to call for help with toileting needs, Stay With Me (per policy), Toilet paper/wipes in reach, Toileting schedule/hourly rounds              Problem: Pressure Injury - Risk of  Goal: *Prevention of pressure injury  Description  Document Edin Scale and appropriate interventions in the flowsheet.   Outcome: Progressing Towards Goal  Note: Pressure Injury Interventions:       Moisture Interventions: Absorbent underpads, Apply protective barrier, creams and emollients, Assess need for specialty bed, Check for incontinence Q2 hours and as needed, Internal/External urinary devices, Maintain skin hydration (lotion/cream), Minimize layers, Moisture barrier    Activity Interventions: Increase time out of bed, Assess need for specialty bed    Mobility Interventions: Assess need for specialty bed, Float heels, HOB 30 degrees or less, Turn and reposition approx. every two hours(pillow and wedges), Pressure redistribution bed/mattress (bed type)    Nutrition Interventions: Discuss nutritional consult with provider    Friction and Shear Interventions: Apply protective barrier, creams and emollients, Feet elevated on foot rest, Foam dressings/transparent film/skin sealants, HOB 30 degrees or less, Minimize layers, Transfer aides:transfer board/Venessa lift/ceiling lift, Transferring/repositioning devices                Problem: Diabetes Self-Management  Goal: *Using medications safely  Description  State effect of diabetes medications on diabetes; name diabetes medication taking, action and side effects.   Outcome: Progressing Towards Goal     Problem: Injury - Risk of, Adverse Drug Event  Goal: *Absence of adverse drug events  Outcome: Progressing Towards Goal     Problem: Injury - Risk of, Adverse Drug Event  Goal: *Absence of medication errors  Outcome: Progressing Towards Goal BPH with elevated PSA

## 2024-01-17 NOTE — PROGRESS NOTE ADULT - ASSESSMENT
83 y.o M w/ PMHx of BPH w/ urinary retention, stroke (2013), HTN, DM, afib not on A/C, dementia (on home Aricept), erectile dysfunction, vertigo, leukocytosis of unknown origin (currently followed by Dr. 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  Initial ER labs were notable for WBC of 47, albumin of 1.7, and hypercalcemia (corrected calcium of 13).  CXR showed scattered small pulmonary infiltrates, no fractures identified on additional x-ray imaging, CT of head was negative for acute intracranial infarct/hemorrhage/mass  (+ minimal anterior periventricular white matter ischemic changes).      Patient was admitted for ambulatory dysfunction, hypercalcemia, and malignancy work-up.  Heme/Onc consulted; per their notes patient with previous BM biopsy in Dec. 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  CT of chest/abd/pelvis (1/7) showed increasing metastatic disease burden to the lungs.  Surgical Oncology consulted, but patient no longer considered a candidate for excisional biopsy of leg mass due to worsening functional/mental status.  IR consulted for possible core biopsy of R thigh mass, but patient unable to tolerate MRI yesterday 1/9 despite sedation with IV Ativan, possible IR mass of lung biopsy now being considered.  Palliative Care service consulted for medical decision making and additional GOC discussion.

## 2024-01-17 NOTE — PROGRESS NOTE ADULT - TIME BILLING
Complexity of active medical problems, bedside assessment, formulating treatment plan after discussion with medical team and consultants; as well as discussion of treatment plan, prognosis, care coordination, and GOC with family
care coordination with [facility] care and case management
- Review of hospital course, labs, vitals, medical records  - Bedside exam and interview  - Discussed plan of care with primary team ACP and housestaff, wife Sadie as above  - Documenting the encounter
medical management, care coordination and discussion with pall care team.

## 2024-01-17 NOTE — PROGRESS NOTE ADULT - TIME-BASED
Notification of Inpatient Admission/Inpatient Authorization Request   This is a Notification of Inpatient Admission for [place]  Be advised that this patient was admitted to our facility under Inpatient Status  Contact Malgorzata Sr at [phone] for additional admission information  [facility]  DEDICATED -[phone]  Patient Name:   Eufemia Brandt   YOB: 1993       [address] [facility]   [facility] [address]:   Angel Begum  Tax ID: [id number]  NPI: [id number] Attending Provider/NPI: Mikayla Huerta Md [[id number]]   Place of Service Code: 24     Place of Service Name:  [address] [place] [address]   Start Date: 8/19/20 1636     Discharge Date & Time: No discharge date for patient encounter  Type of Admission: Inpatient Status Discharge Disposition   (if discharged): Home/Self Care   Patient Diagnoses: Shortness of breath [R06 02]  Preeclampsia [O14 90]  HBP (high blood pressure) [I10]     Orders: Admission Orders (From admission, onward)     Ordered        08/19/20 1636  Inpatient Admission  Once                    Assigned Utilization Review Contact: Malgorzata Sr  Utilization   Network Utilization Review Department  Phone: [phone]; Fax [phone]  Email: [email or web address]   ATTENTION PAYERS: Please call the assigned Utilization  directly with any questions or concerns ALL voicemails in the department are confidential  Send all requests for admission clinical reviews, approved or denied determinations and any other requests to dedicated fax number belonging to the campus where the patient is receiving treatment 
[phone]
[phone]
[phone]
[phone]

## 2024-01-17 NOTE — PROGRESS NOTE ADULT - PROBLEM SELECTOR PLAN 5
- P/w Ca=11.1, likely in s/o malignancy  - Continue to monitor CMP in AM-f/u results   - HemeOn-Dr. Flanneyr consulted, appreciated

## 2024-01-17 NOTE — PROGRESS NOTE ADULT - SUBJECTIVE AND OBJECTIVE BOX
NP Note discussed with  primary attending    Patient is a 83y old  Male who presents with a chief complaint of frequent falls, weakness (17 Jan 2024 11:29)      INTERVAL HPI/OVERNIGHT EVENTS: Pt sleeping, aroused to touch.  Limited exam in lethargic pt    MEDICATIONS  (STANDING):  aspirin  chewable 81 milliGRAM(s) Oral daily  chlorhexidine 2% Cloths 1 Application(s) Topical <User Schedule>  dextrose 5%. 1000 milliLiter(s) (75 mL/Hr) IV Continuous <Continuous>  donepezil 5 milliGRAM(s) Oral at bedtime  enoxaparin Injectable 40 milliGRAM(s) SubCutaneous every 24 hours  melatonin 3 milliGRAM(s) Oral at bedtime  tamsulosin 0.4 milliGRAM(s) Oral at bedtime    MEDICATIONS  (PRN):  LORazepam   Injectable 1 milliGRAM(s) IV Push every 6 hours PRN Agitation  morphine  - Injectable 0.5 milliGRAM(s) IV Push every 4 hours PRN Severe Pain (7 - 10)  polyethylene glycol 3350 17 Gram(s) Oral daily PRN Constipation  senna 2 Tablet(s) Oral at bedtime PRN Constipation      __________________________________________________  REVIEW OF SYSTEMS:  Limited exam in lethargic pt    CONSTITUTIONAL: No fever  EYES: No acute visual disturbances  NECK: No pain or stiffness  RESPIRATORY: No cough; No shortness of breath  CARDIOVASCULAR: No chest pain, no palpitations  GASTROINTESTINAL: No pain. No nausea or vomiting.  No diarrhea   NEUROLOGICAL: No headache or numbness, no tremors  MUSCULOSKELETAL: No joint pain, no muscle pain  GENITOURINARY: No dysuria, no frequency, no hesitancy  PSYCHIATRY: No depression , no anxiety  ALL OTHER  ROS negative        Vital Signs Last 24 Hrs  T(C): 36.7 (17 Jan 2024 05:20), Max: 36.7 (17 Jan 2024 05:20)  T(F): 98.1 (17 Jan 2024 05:20), Max: 98.1 (17 Jan 2024 05:20)  HR: 78 (17 Jan 2024 05:20) (56 - 78)  BP: 113/69 (17 Jan 2024 05:20) (110/42 - 122/50)  BP(mean): 66 (16 Jan 2024 20:09) (66 - 66)  RR: 18 (17 Jan 2024 05:20) (18 - 18)  SpO2: 97% (17 Jan 2024 05:20) (94% - 97%)    Parameters below as of 17 Jan 2024 05:20  Patient On (Oxygen Delivery Method): room air        ________________________________________________  PHYSICAL EXAM:  Limited exam in lethargic pt.    GENERAL: NAD, frail, ill appearing  HEENT: Normocephalic; conjunctivae and sclerae clear; Dry mucous membranes   NECK : Supple  CHEST/LUNG: Clear to auscultation bilaterally with good air entry   HEART: S1 S2  regular; no murmurs, gallops or rubs  ABDOMEN: Soft, Nontender, Nondistended; Bowel sounds present x 4 quad  EXTREMITIES: No cyanosis; no edema; no calf tenderness  SKIN: Warm and dry; no rash  NERVOUS SYSTEM:  Lethargic, aroused to touch   _________________________________________________  LABS:                        8.2    46.92 )-----------( 344      ( 17 Jan 2024 07:10 )             27.8     01-17    148<H>  |  116<H>  |  24<H>  ----------------------------<  115<H>  3.2<L>   |  29  |  0.96    Ca    10.7<H>      17 Jan 2024 07:10  Phos  2.8     01-17  Mg     2.2     01-17    TPro  5.3<L>  /  Alb  1.2<L>  /  TBili  0.8  /  DBili  x   /  AST  10  /  ALT  15  /  AlkPhos  154<H>  01-17      Urinalysis Basic - ( 17 Jan 2024 07:10 )    Color: x / Appearance: x / SG: x / pH: x  Gluc: 115 mg/dL / Ketone: x  / Bili: x / Urobili: x   Blood: x / Protein: x / Nitrite: x   Leuk Esterase: x / RBC: x / WBC x   Sq Epi: x / Non Sq Epi: x / Bacteria: x      CAPILLARY BLOOD GLUCOSE            RADIOLOGY & ADDITIONAL TESTS:    Imaging Personally Reviewed:  YES    Consultant(s) Notes Reviewed:   YES    Care Discussed with Consultants :     Plan of care was discussed with patient and /or primary care giver; all questions and concerns were addressed and care was aligned with patient's wishes.

## 2024-01-17 NOTE — PROGRESS NOTE ADULT - PROBLEM SELECTOR PLAN 2
H/o Leukocytosis, follows w/ Dr. Pugh @ [facility], with negative outpatient leukemia workup  - S/p CT Abd & Pelvis showed right thigh mass  - S/p CT Chest showed extensive progressive metastatic burden to the lungs  - SxOn-Dr. Sage consulted, appreciated-not a candidate for excisional biopsy  - S/p IR for core biopsy of thigh and pt unable to tolerate MR despite Ativan prior  - HemeOn-Dr. Flannery consulted, appreciated   - Palliative following for dc planning

## 2024-01-17 NOTE — PROGRESS NOTE ADULT - PROBLEM SELECTOR PLAN 6
Clinical evidence indicates that the patient has Severe protein calorie malnutrition/ 3rd degree    In context of      Chronic Illness (>1 month)--progressive vascular dementia, now with metastatic cancer of unknown primary site, as evidenced by:    Energy/Food intake <50% of estimated energy requirement >5 days  Weight loss: Moderate - severe (lbs lost recently)  Body Fat loss: Severe   + mild temporal wasting  Muscle mass loss: Severe  -- albumin now 1.2   Strength: weakened severe (bedbound)    Recommend:   Continue soft and bite sized diet as tolerated - aspiration precautions, careful hand-feeding, teaching to caregivers  On supplementation with Ensure Enlive BID    See [facility] discussion above--wife has now elected for no feeding tube

## 2024-01-18 NOTE — PROGRESS NOTE ADULT - PROBLEM SELECTOR PLAN 4
H/o Leukocytosis, follows w/ Dr. Pugh @ [facility], with negative outpatient leukemia workup  P/w WBC 47.92 on admission  Likely reactive in s/o malignancy   No more Lab draws for pt comfort  Heme/Onc-Dr. Flannery consulted, appreciated

## 2024-01-18 NOTE — PROGRESS NOTE ADULT - PROBLEM SELECTOR PLAN 2
H/o Leukocytosis, follows w/ Dr. Pugh @ [facility], with negative outpatient leukemia workup  CT Abd & Pelvis showed right thigh mass  S/p CT Chest showed extensive progressive metastatic burden to the lungs  Sx/Onc-Dr. Sage consulted, not a candidate for excisional biopsy  IR consulted for core needle biopsy, pt unable to tolerate MR despite Ativan prior  Heme/Onc-Dr. Flannery consulted, appreciated   Palliative following for dc planning

## 2024-01-18 NOTE — PROGRESS NOTE ADULT - PROBLEM SELECTOR PLAN 5
P/w Ca=11.1, likely in s/o malignancy  No more lab draws for pt comfort  Heme/Onc-Dr. Flannery consulted, appreciated

## 2024-01-18 NOTE — PROGRESS NOTE ADULT - ASSESSMENT
83 year old, Male from home with PMH of BPH w/ urinary retention, stroke (2013), emphysema, afib (not on AC), dementia, erectile dysfunction, vertigo, leukocytosis of unknown origin (currently being worked up at [facility] Dr. 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  Presented s/p fall on the way to his Cardiologist.  Admitted for Ambulatory dysfunction and malignancy workup.  Palliative following Pt is now DNR/DNI

## 2024-01-18 NOTE — PROGRESS NOTE ADULT - PROBLEM SELECTOR PLAN 10
Pt from home  Palliative following for dc planning.  Possibly LTC w/ Hospice- pt accepted at [facility] pending financials from family

## 2024-01-18 NOTE — PROGRESS NOTE ADULT - SUBJECTIVE AND OBJECTIVE BOX
DATE OF SERVICE: 01-18-24    sleeping, appears comfortable     aspirin  chewable 81 milliGRAM(s) Oral daily  chlorhexidine 2% Cloths 1 Application(s) Topical <User Schedule>  dextrose 5%. 1000 milliLiter(s) IV Continuous <Continuous>  enoxaparin Injectable 40 milliGRAM(s) SubCutaneous every 24 hours  fentaNYL   Patch  12 MICROgram(s)/Hr 1 Patch Transdermal every 72 hours  LORazepam   Injectable 1 milliGRAM(s) IV Push every 6 hours PRN  melatonin 3 milliGRAM(s) Oral at bedtime  morphine  - Injectable 0.5 milliGRAM(s) IV Push every 4 hours PRN  tamsulosin 0.4 milliGRAM(s) Oral at bedtime                            8.2    46.92 )-----------( 344      ( 17 Jan 2024 07:10 )             27.8       Hemoglobin: 8.2 g/dL (01-17 @ 07:10)  Hemoglobin: 9.1 g/dL (01-16 @ 06:30)  Hemoglobin: 10.7 g/dL (01-15 @ 08:00)      01-17    148<H>  |  116<H>  |  24<H>  ----------------------------<  115<H>  3.2<L>   |  29  |  0.96    Ca    10.7<H>      17 Jan 2024 07:10  Phos  2.8     01-17  Mg     2.2     01-17    TPro  5.3<L>  /  Alb  1.2<L>  /  TBili  0.8  /  DBili  x   /  AST  10  /  ALT  15  /  AlkPhos  154<H>  01-17    Creatinine Trend: 0.96<--, 0.80<--, 0.92<--, 0.91<--, 0.82<--, 0.90<--    COAGS:           T(C): 36.8 (01-18-24 @ 14:16), Max: 36.8 (01-18-24 @ 05:03)  HR: 77 (01-18-24 @ 14:16) (68 - 77)  BP: 113/88 (01-18-24 @ 14:16) (102/42 - 113/88)  RR: 18 (01-18-24 @ 14:16) (16 - 19)  SpO2: 90% (01-18-24 @ 14:16) (90% - 99%)  Wt(kg): --    I&O's Summary      HEENT:  (-)icterus (-)pallor  CV: N S1 S2 1/6 DANNY (+)2 Pulses B/l  Resp:  Clear to ausculatation B/L, normal effort  GI: (+) BS Soft, NT, ND  Lymph:  (-)Edema, (-)obvious lymphadenopathy  Skin: Warm to touch, Normal turgor  Psych: Unable to adequately assess mood and affect    ASSESSMENT/PLAN: 	83y Male  PMHx of BPH w/ urinary retention, stroke (2013), HTN, DM, afib not on ac, dementia, erectile dysfunction, vertigo, leukocytosis of unknown origin (currently being worked up at [facility] Dr. 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    # Abnormal EKG  - Appear to have wondering atrial pacemaker  - tachy overnight, no complaints of palpitation   - ECHO noted , normal LV fx  - No need for further inpatient cardiac work up.    # Fall  - No evidence of LOC    # Leukocytosis  - Heme/onc f/u  - Surgery f/u noted    # GOC  - for potential hospice placement   - awaiting family decision    Fredi Wilson MD, [facility]  BEEPER [phone]

## 2024-01-18 NOTE — PROGRESS NOTE ADULT - SUBJECTIVE AND OBJECTIVE BOX
Patient is a 83y old  Male who presents with a chief complaint of frequent falls, weakness (17 Jan 2024 16:05)      INTERVAL HPI/OVERNIGHT EVENTS: no new complaints    MEDICATIONS  (STANDING):  aspirin  chewable 81 milliGRAM(s) Oral daily  chlorhexidine 2% Cloths 1 Application(s) Topical <User Schedule>  dextrose 5%. 1000 milliLiter(s) (75 mL/Hr) IV Continuous <Continuous>  enoxaparin Injectable 40 milliGRAM(s) SubCutaneous every 24 hours  fentaNYL   Patch  12 MICROgram(s)/Hr 1 Patch Transdermal every 72 hours  melatonin 3 milliGRAM(s) Oral at bedtime  tamsulosin 0.4 milliGRAM(s) Oral at bedtime    MEDICATIONS  (PRN):  LORazepam   Injectable 1 milliGRAM(s) IV Push every 6 hours PRN Agitation  morphine  - Injectable 0.5 milliGRAM(s) IV Push every 4 hours PRN Severe Pain (7 - 10)      __________________________________________________  REVIEW OF SYSTEMS:    CONSTITUTIONAL: No fever,   EYES: no acute visual disturbances  NECK: No pain or stiffness  RESPIRATORY: No cough; No shortness of breath  CARDIOVASCULAR: No chest pain, no palpitations  GASTROINTESTINAL: No pain. No nausea or vomiting; No diarrhea   NEUROLOGICAL: No headache or numbness, no tremors  MUSCULOSKELETAL: No joint pain, no muscle pain  GENITOURINARY: no dysuria, no frequency, no hesitancy  PSYCHIATRY: no depression , no anxiety  ALL OTHER  ROS negative      Vital Signs Last 24 Hrs  T(C): 36.8 (18 Jan 2024 05:03), Max: 36.8 (18 Jan 2024 05:03)  T(F): 98.3 (18 Jan 2024 05:03), Max: 98.3 (18 Jan 2024 05:03)  HR: 72 (18 Jan 2024 05:03) (68 - 78)  BP: 102/42 (18 Jan 2024 05:03) (102/42 - 123/63)  BP(mean): --  RR: 19 (18 Jan 2024 05:03) (16 - 19)  SpO2: 99% (18 Jan 2024 05:03) (92% - 99%)    Parameters below as of 18 Jan 2024 05:03  Patient On (Oxygen Delivery Method): nasal cannula  O2 Flow (L/min): 2      ________________________________________________  PHYSICAL EXAM:  GENERAL: NAD  HEENT: Normocephalic;  conjunctivae and sclerae clear; moist mucous membranes;   NECK : supple  CHEST/LUNG: Clear to auscultation bilaterally with good air entry   HEART: S1 S2  regular; no murmurs, gallops or rubs  ABDOMEN: Soft, Nontender, Nondistended; Bowel sounds present  EXTREMITIES: no cyanosis; no edema; no calf tenderness  SKIN: warm and dry; no rash  NERVOUS SYSTEM:  Awake and alert; Oriented  to place, person and time ; no new deficits    _________________________________________________  LABS:                        8.2    46.92 )-----------( 344      ( 17 Jan 2024 07:10 )             27.8     01-17    148<H>  |  116<H>  |  24<H>  ----------------------------<  115<H>  3.2<L>   |  29  |  0.96    Ca    10.7<H>      17 Jan 2024 07:10  Phos  2.8     01-17  Mg     2.2     01-17    TPro  5.3<L>  /  Alb  1.2<L>  /  TBili  0.8  /  DBili  x   /  AST  10  /  ALT  15  /  AlkPhos  154<H>  01-17      Urinalysis Basic - ( 17 Jan 2024 07:10 )    Color: x / Appearance: x / SG: x / pH: x  Gluc: 115 mg/dL / Ketone: x  / Bili: x / Urobili: x   Blood: x / Protein: x / Nitrite: x   Leuk Esterase: x / RBC: x / WBC x   Sq Epi: x / Non Sq Epi: x / Bacteria: x      CAPILLARY BLOOD GLUCOSE          RADIOLOGY & ADDITIONAL TESTS:    Imaging Personally Reviewed:  YES/NO    Consultant(s) Notes Reviewed:   YES/ No    Care Discussed with Consultants :     Plan of care was discussed with patient and /or primary care giver; all questions and concerns were addressed and care was aligned with patient's wishes.       Patient is a 83y old  Male who presents with a chief complaint of frequent falls, weakness (17 Jan 2024 16:05)      INTERVAL HPI/OVERNIGHT EVENTS: pt seen at bedside, he responsive but lethargic.    MEDICATIONS  (STANDING):  aspirin  chewable 81 milliGRAM(s) Oral daily  chlorhexidine 2% Cloths 1 Application(s) Topical <User Schedule>  dextrose 5%. 1000 milliLiter(s) (75 mL/Hr) IV Continuous <Continuous>  enoxaparin Injectable 40 milliGRAM(s) SubCutaneous every 24 hours  fentaNYL   Patch  12 MICROgram(s)/Hr 1 Patch Transdermal every 72 hours  melatonin 3 milliGRAM(s) Oral at bedtime  tamsulosin 0.4 milliGRAM(s) Oral at bedtime    MEDICATIONS  (PRN):  LORazepam   Injectable 1 milliGRAM(s) IV Push every 6 hours PRN Agitation  morphine  - Injectable 0.5 milliGRAM(s) IV Push every 4 hours PRN Severe Pain (7 - 10)  __________________________________________________  REVIEW OF SYSTEMS:    Unable due to lethargy     Vital Signs Last 24 Hrs  T(C): 36.8 (18 Jan 2024 05:03), Max: 36.8 (18 Jan 2024 05:03)  T(F): 98.3 (18 Jan 2024 05:03), Max: 98.3 (18 Jan 2024 05:03)  HR: 72 (18 Jan 2024 05:03) (68 - 78)  BP: 102/42 (18 Jan 2024 05:03) (102/42 - 123/63)  BP(mean): --  RR: 19 (18 Jan 2024 05:03) (16 - 19)  SpO2: 99% (18 Jan 2024 05:03) (92% - 99%)    Parameters below as of 18 Jan 2024 05:03  Patient On (Oxygen Delivery Method): nasal cannula  O2 Flow (L/min): 2    ________________________________________________    Limited due to pt lethargy  PHYSICAL EXAM:  GENERAL: NAD  HEENT: Normocephalic;  conjunctivae and sclerae clear; moist mucous membranes;   NECK : supple  CHEST/LUNG: Clear to auscultation bilaterally   HEART: S1 S2  regular; no murmurs, gallops or rubs  ABDOMEN: Soft, Nontender, Nondistended; Bowel sounds present  EXTREMITIES: no cyanosis; no edema; no calf tenderness  SKIN: warm and dry; no rash, mass right medial thigh  NERVOUS SYSTEM:  Lethargic responsive to painful stimuli, no new deficits    _________________________________________________  LABS:                        8.2    46.92 )-----------( 344      ( 17 Jan 2024 07:10 )             27.8     01-17    148<H>  |  116<H>  |  24<H>  ----------------------------<  115<H>  3.2<L>   |  29  |  0.96    Ca    10.7<H>      17 Jan 2024 07:10  Phos  2.8     01-17  Mg     2.2     01-17    TPro  5.3<L>  /  Alb  1.2<L>  /  TBili  0.8  /  DBili  x   /  AST  10  /  ALT  15  /  AlkPhos  154<H>  01-17    Urinalysis Basic - ( 17 Jan 2024 07:10 )    Color: x / Appearance: x / SG: x / pH: x  Gluc: 115 mg/dL / Ketone: x  / Bili: x / Urobili: x   Blood: x / Protein: x / Nitrite: x   Leuk Esterase: x / RBC: x / WBC x   Sq Epi: x / Non Sq Epi: x / Bacteria: x      RADIOLOGY & ADDITIONAL TESTS:  < from: CT Abdomen and Pelvis w/ IV Cont (01.07.24 @ 13:41) >  PROCEDURE DATE:  01/07/2024          INTERPRETATION:  CLINICAL INFORMATION: Right leg malignancy.    COMPARISON: Outside contrast-enhanced CT of the thorax, abdomen, and   pelvis November 6, 2023.    CONTRAST/COMPLICATIONS:  IV Contrast: Omnipaque 350 (accession [id number]), IV contrast documented   in unlinked concurrent exam (accession [id number] cc administered   10   cc discarded  Oral Contrast: NONE  Complications: None reported at time of study completion    PROCEDURE:  CT of the Chest, Abdomen and Pelvis was performed.  Sagittal and coronal reformats were performed.    FINDINGS:  CHEST:  LUNGS AND LARGE AIRWAYS: Patent central airways. There is been interval   increase in size and number of numerous centrally necrotic lung masses   measuring up to 3.7 cm in the left lower lobe compatible with metastatic   disease. Mild right lowerlobe dependent atelectasis.  PLEURA: Trace bilateral layering pleural effusions, right greater than   left.  VESSELS: Within normal limits.  HEART: Heart size is normal. No pericardial effusion.  MEDIASTINUM AND REMY: No lymphadenopathy.  CHEST WALL AND LOWER NECK: Within normal limits.    ABDOMEN AND PELVIS:  LIVER: Within normal limits.  BILE DUCTS: Normal caliber.  GALLBLADDER: Within normal limits.  SPLEEN: Within normal limits.  PANCREAS: Within normal limits.  ADRENALS: Within normal limits.  KIDNEYS/URETERS: Left renal cysts. Otherwise, within normal limits.    BLADDER: Within normal limits.  REPRODUCTIVE ORGANS: The prostate is not enlarged.    BOWEL: Colonic diverticulosis. No bowel obstruction. Appendix is normal.   There is no mesenteric adenopathy.  PERITONEUM: No ascites.  VESSELS: Within normal limits.  RETROPERITONEUM/LYMPH NODES: No lymphadenopathy.  ABDOMINAL WALL: Small fat-containing left inguinal hernia.  BONES: Mild anterolisthesis of L4 on L5. Degenerative disc disease at   L5-S1.. There is been interval increase in size of an incompletely   visualized peripherally enhancing mass in the right medial thigh.    IMPRESSION: Extensive progressive metastatic burden to the lungs.    --- End of Report ---      < end of copied text >  < from: Xray Knee 3 Views, Left (01.04.24 @ 17:31) >  PROCEDURE DATE:  01/04/2024          INTERPRETATION:  Left knee, pelvis, and chest. Patient had a fall.    Left knee. 2 views.    No effusion. Quite advanced degeneration. No fracture. Some arterial   calcification.    Pelvis.    There is slight symmetric spurring of theouter acetabular corners. There   is degenerative loss of disc height concentrated on the right at L4-5. No   fracture.    Right abdominal calcifications could be and lymph nodes.    AP chest on January 4, 2024 at 5:05 PM.    Heart magnified by technique.    There is a left base mass measuring 3.9 cm which can be seen on CAT scan   of November 6, 2023. The present film shows small scattered infiltrates   which appear new since prior.    IMPRESSION: No fracture. Quite advanced left knee degeneration. Stable   left breast mass which on outside CAT scan was largely fatty in nature.    Presently there are small scattered lung infiltrates.    --- End of Report ---      < end of copied text >      Imaging Personally Reviewed:  YES    Consultant(s) Notes Reviewed:   YES    Care Discussed with Consultants :     Plan of care was discussed with patient and /or primary care giver; all questions and concerns were addressed and care was aligned with patient's wishes.

## 2024-01-18 NOTE — PROGRESS NOTE ADULT - PROBLEM SELECTOR PLAN 1
Pt lethargic with decreased PO intake  C/W feed assist with aspiration precautions.    C/w D5  No more lab draws for pt comfort  Palliative following for dc planning

## 2024-01-18 NOTE — PROGRESS NOTE ADULT - NS ATTEND AMEND GEN_ALL_CORE FT
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  - stage 4 cancer likely  # Lung nodules suspicious of malignancy   # Enlarged prostate, concerning for malignancy   # Leukocytosis, likely due to malignancy, ruled out leukemia with BM biopsy    # Anemia   # Delirium, possible hospital onset delirium; unlikely infection, CT head neg, electrolytes stable- resolved   # Hypercalcemia of malignancy   # Renal complex mass   # Abnormal EKG, appearing  2/2 wondering atrial pacemaker   # Atrial fibrillation not on AC   # hx of CVA, cerebellar infarct    # Emphysema    # Severe protein calorie malnutrition     -no change in condition, does not response to verbal stimuli.  - case d.w Dr. Rodas- patient started on fentanyl pathch and made DNR/DNI- Plan for LTC hospice, will c/w D5., stop bowel regimen and aricept  -will not pursue labs further to provide comfort and avoid unnecessary sticks.  patient somnolent not responding to verbal commands. per report unable to get MRI, and not a surgical candidate. given poor prognosis, multiple comorbidities and advance age unlikely to tolerate chemo. will not recommend biospy at this time.   -aspiration precautions.

## 2024-01-19 NOTE — PROGRESS NOTE ADULT - PROBLEM SELECTOR PLAN 8
C/W [facility]  Urology-Dr. Hunt consulted, appreciated D/C Flomax  **comfort care only

## 2024-01-19 NOTE — PROGRESS NOTE ADULT - PROBLEM SELECTOR PLAN 1
Pt lethargic with decreased PO intake  C/W feed assist with aspiration precautions.    Palliative following  No more lab draws  comfort care only Pt lethargic with decreased PO intake  C/W feed assist with aspiration precautions.    Palliative following  No more lab draws  **Comfort care only

## 2024-01-19 NOTE — PROGRESS NOTE ADULT - PROBLEM SELECTOR PLAN 2
H/o Leukocytosis, follows w/ Dr. Pugh @ [facility], with negative outpatient leukemia workup  CT Abd & Pelvis showed right thigh mass  S/p CT Chest showed extensive progressive metastatic burden to the lungs  Sx/Onc-Dr. Sage consulted, not a candidate for excisional biopsy  IR consulted for core needle biopsy, pt unable to tolerate MR despite Ativan prior  Heme/Onc-Dr. Flannery   Palliative following   Comfort care only H/o Leukocytosis, follows w/ Dr. Pugh @ [facility], with negative outpatient leukemia workup  CT Abd & Pelvis showed right thigh mass  S/p CT Chest showed extensive progressive metastatic burden to the lungs  Sx/Onc-Dr. Sage consulted, not a candidate for excisional biopsy  IR consulted for core needle biopsy, pt unable to tolerate MR despite Ativan prior  Heme/Onc-Dr. Flannery   Palliative following   **Comfort care only

## 2024-01-19 NOTE — PROGRESS NOTE ADULT - NS ATTEND AMEND GEN_ALL_CORE FT
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 does not response to verbal stimuli. comfort care for LTC hospice   - case d.w Dr. Rodas- patient started on fentanyl pathch and made DNR/DNI-   -will not pursue labs further to provide comfort and avoid unnecessary sticks.  patient somnolent not responding to verbal commands. per report unable to get MRI, and not a surgical candidate. given poor prognosis, multiple comorbidities and advance age unlikely to tolerate chemo. will not recommend biopsy at this time.   -aspiration precautions.

## 2024-01-19 NOTE — PROGRESS NOTE ADULT - ASSESSMENT
83 year old, Male from home with PMH of BPH w/ urinary retention, stroke (2013), emphysema, afib (not on AC), dementia, erectile dysfunction, vertigo, leukocytosis of unknown origin (currently being worked up at [facility] Dr. 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  Presented s/p fall on the way to his Cardiologist.  Admitted for Ambulatory dysfunction and malignancy workup.  Palliative following Pt is now DNR/DNI and comfort measures

## 2024-01-19 NOTE — PROGRESS NOTE ADULT - PROBLEM SELECTOR PLAN 4
H/o Leukocytosis, follows w/ Dr. Pugh @ [facility], with negative outpatient leukemia workup  P/w WBC 47.92 on admission  Likely reactive in s/o malignancy   No more Lab draws for pt comfort  Heme/Onc-Dr. Flannery consulted, appreciated H/o Leukocytosis, follows w/ Dr. Pugh @ [facility], with negative outpatient leukemia workup  P/w WBC 47.92 on admission  Likely reactive in s/o malignancy   Heme/Onc-Dr. Flannery   **Comfort care only

## 2024-01-19 NOTE — PROGRESS NOTE ADULT - PROBLEM SELECTOR PLAN 3
H/o mechanical fall outpt  PT recommended [facility].  Possibly LTC w/ Hospice. H/o mechanical fall outpt  PT recommended [facility].  Pending Hospice transfer  **Comfort care only

## 2024-01-19 NOTE — PROGRESS NOTE ADULT - PROBLEM SELECTOR PLAN 5
P/w Ca=11.1, likely in s/o malignancy  No more lab draws for pt comfort  Heme/Onc-Dr. Flannery consulted, appreciated P/w Ca=11.1, likely in s/o malignancy  No more lab draws for pt comfort  Heme/Onc-Dr. Flannery   **Comfort care only

## 2024-01-19 NOTE — CHART NOTE - NSCHARTNOTEFT_GEN_A_CORE
Assessment:   83yMalePatient is a 83y old  Male who presents with a chief complaint of frequent falls, weakness (19 Jan 2024 11:25). Pt Visited. Pt asleep. Observed Post Lunch meal. Tray Untouched. D/W RN Pt with Poor appetite. Wife Brings food from home at dinner time which he eats little bit. Calorie. Pt is On calorie count ordered on 1/16. Palliative on case. Plan is for LTC with Possible Hospice. Labs noted Na 148, BUN 24 ,       Factors impacting intake: [ ] none [ ] nausea  [ ] vomiting [ ] diarrhea [ ] constipation  [ ]chewing problems [ ] swallowing issues  [ ] other:     Diet Prescription: Soft and Bite Size, Ensure Enlive BID   Intake: Wife     Current Weight:   % Weight Change    Pertinent Medications: MEDICATIONS  (STANDING):  chlorhexidine 2% Cloths 1 Application(s) Topical <User Schedule>  dextrose 5%. 1000 milliLiter(s) (75 mL/Hr) IV Continuous <Continuous>  enoxaparin Injectable 40 milliGRAM(s) SubCutaneous every 24 hours  fentaNYL   Patch  12 MICROgram(s)/Hr 1 Patch Transdermal every 72 hours  melatonin 3 milliGRAM(s) Oral at bedtime  tamsulosin 0.4 milliGRAM(s) Oral at bedtime    MEDICATIONS  (PRN):  LORazepam   Injectable 1 milliGRAM(s) IV Push every 6 hours PRN Agitation  morphine  - Injectable 0.5 milliGRAM(s) IV Push every 4 hours PRN Severe Pain (7 - 10)    Pertinent Labs: 01-17 Na148 mmol/L<H> Glu 115 mg/dL<H> K+ 3.2 mmol/L<L> Cr  0.96 mg/dL BUN 24 mg/dL<H> 01-17 Phos 2.8 mg/dL 01-17 Alb 1.2 g/dL<L>     CAPILLARY BLOOD GLUCOSE        Skin:     Estimated Needs:   [ ] no change since previous assessment  [ ] recalculated:     Previous Nutrition Diagnosis:   [ ] Inadequate Energy Intake [ ]Inadequate Oral Intake [ ] Excessive Energy Intake   [ ] Underweight [ ] Increased Nutrient Needs [ ] Overweight/Obesity   [ ] Altered GI Function [ ] Unintended Weight Loss [ ] Food & Nutrition Related Knowledge Deficit [x ] Malnutrition  Severe     Nutrition Diagnosis is [x ] ongoing  [ ] resolved [ ] not applicable     New Nutrition Diagnosis: [ ] not applicable       Interventions:   Recommend  [ ] Change Diet To:  [x ] Nutrition Supplement Continue With Ensure enlive BID   [ ] Nutrition Support  [ x] Other: Respect Pt and Family Wishes.     Monitoring and Evaluation:   [ ] PO intake [ x ] Tolerance to diet prescription [ x ] weights [ x ] labs[ x ] follow up per protocol  [ ] other:

## 2024-01-19 NOTE — PROGRESS NOTE ADULT - SUBJECTIVE AND OBJECTIVE BOX
Patient is a 83y old  Male who presents with a chief complaint of frequent falls, weakness (19 Jan 2024 11:25)      INTERVAL HPI/OVERNIGHT EVENTS: pt seen at bedside. pt is lethargic and responsive to painful stimuli.    MEDICATIONS  (STANDING):  chlorhexidine 2% Cloths 1 Application(s) Topical <User Schedule>  dextrose 5%. 1000 milliLiter(s) (75 mL/Hr) IV Continuous <Continuous>  enoxaparin Injectable 40 milliGRAM(s) SubCutaneous every 24 hours  fentaNYL   Patch  12 MICROgram(s)/Hr 1 Patch Transdermal every 72 hours  melatonin 3 milliGRAM(s) Oral at bedtime  tamsulosin 0.4 milliGRAM(s) Oral at bedtime    MEDICATIONS  (PRN):  LORazepam   Injectable 1 milliGRAM(s) IV Push every 6 hours PRN Agitation  morphine  - Injectable 0.5 milliGRAM(s) IV Push every 4 hours PRN Severe Pain (7 - 10)      __________________________________________________  REVIEW OF SYSTEMS:    Unable due to lethargic patient    Vital Signs Last 24 Hrs  T(C): 36.2 (19 Jan 2024 12:45), Max: 36.7 (18 Jan 2024 21:36)  T(F): 97.2 (19 Jan 2024 12:45), Max: 98.1 (18 Jan 2024 21:36)  HR: 77 (19 Jan 2024 12:45) (76 - 78)  BP: 108/46 (19 Jan 2024 12:45) (108/46 - 113/42)  BP(mean): --  RR: 16 (19 Jan 2024 12:45) (16 - 18)  SpO2: 93% (19 Jan 2024 12:45) (93% - 99%)    Parameters below as of 19 Jan 2024 12:45  Patient On (Oxygen Delivery Method): room air        ________________________________________________  PHYSICAL EXAM:  GENERAL: NAD  NECK : supple  CHEST/LUNG: Clear to auscultation bilaterally   HEART: S1 S2  regular; no murmurs, gallops or rubs  ABDOMEN: Soft, Nondistended; Bowel sounds present  EXTREMITIES: no cyanosis; no edema; mass right medial thigh  SKIN: warm and dry; no rash  NERVOUS SYSTEM:  lethargic, responsive to painful stimuli  _________________________________________________  LABS: comfort measures    RADIOLOGY & ADDITIONAL TESTS:      Plan of care was discussed with patient and /or primary care giver; all questions and concerns were addressed and care was aligned with patient's wishes.

## 2024-01-19 NOTE — CHART NOTE - NSCHARTNOTEFT_GEN_A_CORE
Assessment:   83yMalePatient is a 83y old  Male who presents with a chief complaint of frequent falls, weakness (19 Jan 2024 11:25). Pt Visited. Pt asleep,.       Factors impacting intake: [ ] none [ ] nausea  [ ] vomiting [ ] diarrhea [ ] constipation  [ ]chewing problems [ ] swallowing issues  [ ] other:     Diet Presciption:   Intake:     Current Weight:   % Weight Change    Pertinent Medications: MEDICATIONS  (STANDING):  chlorhexidine 2% Cloths 1 Application(s) Topical <User Schedule>  dextrose 5%. 1000 milliLiter(s) (75 mL/Hr) IV Continuous <Continuous>  enoxaparin Injectable 40 milliGRAM(s) SubCutaneous every 24 hours  fentaNYL   Patch  12 MICROgram(s)/Hr 1 Patch Transdermal every 72 hours  melatonin 3 milliGRAM(s) Oral at bedtime  tamsulosin 0.4 milliGRAM(s) Oral at bedtime    MEDICATIONS  (PRN):  LORazepam   Injectable 1 milliGRAM(s) IV Push every 6 hours PRN Agitation  morphine  - Injectable 0.5 milliGRAM(s) IV Push every 4 hours PRN Severe Pain (7 - 10)    Pertinent Labs: 01-17 Na148 mmol/L<H> Glu 115 mg/dL<H> K+ 3.2 mmol/L<L> Cr  0.96 mg/dL BUN 24 mg/dL<H> 01-17 Phos 2.8 mg/dL 01-17 Alb 1.2 g/dL<L>     CAPILLARY BLOOD GLUCOSE        Skin:     Estimated Needs:   [ ] no change since previous assessment  [ ] recalculated:     Previous Nutrition Diagnosis:   [ ] Inadequate Energy Intake [ ]Inadequate Oral Intake [ ] Excessive Energy Intake   [ ] Underweight [ ] Increased Nutrient Needs [ ] Overweight/Obesity   [ ] Altered GI Function [ ] Unintended Weight Loss [ ] Food & Nutrition Related Knowledge Deficit [ ] Malnutrition     Nutrition Diagnosis is [ ] ongoing  [ ] resolved [ ] not applicable     New Nutrition Diagnosis: [ ] not applicable       Interventions:   Recommend  [ ] Change Diet To:  [ ] Nutrition Supplement  [ ] Nutrition Support  [ ] Other:     Monitoring and Evaluation:   [ ] PO intake [ x ] Tolerance to diet prescription [ x ] weights [ x ] labs[ x ] follow up per protocol  [ ] other:

## 2024-01-19 NOTE — PROGRESS NOTE ADULT - SUBJECTIVE AND OBJECTIVE BOX
DATE OF SERVICE: 01-19-24    resting comfortable     chlorhexidine 2% Cloths 1 Application(s) Topical <User Schedule>  dextrose 5%. 1000 milliLiter(s) IV Continuous <Continuous>  enoxaparin Injectable 40 milliGRAM(s) SubCutaneous every 24 hours  fentaNYL   Patch  12 MICROgram(s)/Hr 1 Patch Transdermal every 72 hours  LORazepam   Injectable 1 milliGRAM(s) IV Push every 6 hours PRN  melatonin 3 milliGRAM(s) Oral at bedtime  morphine  - Injectable 0.5 milliGRAM(s) IV Push every 4 hours PRN  tamsulosin 0.4 milliGRAM(s) Oral at bedtime          Hemoglobin: 8.2 g/dL (01-17 @ 07:10)  Hemoglobin: 9.1 g/dL (01-16 @ 06:30)  Hemoglobin: 10.7 g/dL (01-15 @ 08:00)            Creatinine Trend: 0.96<--, 0.80<--, 0.92<--, 0.91<--, 0.82<--, 0.90<--    COAGS:           T(C): 36.3 (01-19-24 @ 05:05), Max: 36.8 (01-18-24 @ 14:16)  HR: 76 (01-19-24 @ 05:05) (76 - 78)  BP: 113/42 (01-19-24 @ 05:05) (109/65 - 113/88)  RR: 18 (01-19-24 @ 05:05) (17 - 18)  SpO2: 99% (01-19-24 @ 05:05) (90% - 99%)  Wt(kg): --    I&O's Summary      HEENT:  (-)icterus (-)pallor  CV: N S1 S2 1/6 DANNY (+)2 Pulses B/l  Resp:  Clear to ausculatation B/L, normal effort  GI: (+) BS Soft, NT, ND  Lymph:  (-)Edema, (-)obvious lymphadenopathy  Skin: Warm to touch, Normal turgor  Psych: Unable to adequately assess mood and affect    ASSESSMENT/PLAN: 	83y Male  PMHx of BPH w/ urinary retention, stroke (2013), HTN, DM, afib not on ac, dementia, erectile dysfunction, vertigo, leukocytosis of unknown origin (currently being worked up at [facility] Dr. 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    # Abnormal EKG  - Appear to have wondering atrial pacemaker  - tachy overnight, no complaints of palpitation   - ECHO noted , normal LV fx  - No need for further inpatient cardiac work up.    # Fall  - No evidence of LOC    # Leukocytosis  - Heme/onc f/u  - Surgery f/u noted    # GOC  - for potential hospice placement   - awaiting family decision    Fredi Wilson MD, [facility]  BEEPER [phone]

## 2024-01-19 NOTE — PROGRESS NOTE ADULT - PROBLEM SELECTOR PLAN 7
H/o AFib, not on meds    C/W ASA   Cards Dr. Wilson consulted, appreciated H/o AFib, not on meds    D/C ASA  **Comfort care only

## 2024-01-19 NOTE — PROGRESS NOTE ADULT - PROBLEM SELECTOR PLAN 10
Pt from home  Palliative following for dc planning.  Possibly LTC w/ Hospice- pt accepted at [facility] pending financials from family Pt from home  Palliative following for dc planning.  Possibly LTC w/ Hospice- Pending auth

## 2024-01-20 NOTE — PROGRESS NOTE ADULT - ASSESSMENT
Attending addendum:   Agree with above   TTE with normal LV function   No further inpatient cardiac workup needed at this time.     Nacho Plaza MD

## 2024-01-20 NOTE — PROGRESS NOTE ADULT - SUBJECTIVE AND OBJECTIVE BOX
DATE OF SERVICE: 01-20-24     No distress , pt resting in bed this am        chlorhexidine 2% Cloths 1 Application(s) Topical <User Schedule>  dextrose 5%. 1000 milliLiter(s) IV Continuous <Continuous>  enoxaparin Injectable 40 milliGRAM(s) SubCutaneous every 24 hours  fentaNYL   Patch  12 MICROgram(s)/Hr 1 Patch Transdermal every 72 hours  melatonin 3 milliGRAM(s) Oral at bedtime  morphine  - Injectable 0.5 milliGRAM(s) IV Push every 4 hours PRN          Hemoglobin: 8.2 g/dL (01-17 @ 07:10)  Hemoglobin: 9.1 g/dL (01-16 @ 06:30)            Creatinine Trend: 0.96<--, 0.80<--, 0.92<--, 0.91<--, 0.82<--, 0.90<--    COAGS:           T(C): 36.3 (01-20-24 @ 05:05), Max: 36.5 (01-19-24 @ 22:36)  HR: 100 (01-20-24 @ 05:05) (77 - 100)  BP: 112/71 (01-20-24 @ 05:05) (105/54 - 112/71)  RR: 19 (01-20-24 @ 05:05) (16 - 20)  SpO2: 94% (01-20-24 @ 05:05) (93% - 98%)  Wt(kg): --    I&O's Summary      HEENT:  (-)icterus (-)pallor  CV: N S1 S2 1/6 DANNY (+)2 Pulses B/l  Resp:  Clear to ausculatation B/L, normal effort  GI: (+) BS Soft, NT, ND  Lymph:  (-)Edema, (-)obvious lymphadenopathy  Skin: Warm to touch, Normal turgor  Psych: Unable to adequately assess mood and affect    ASSESSMENT/PLAN: 	83y Male  PMHx of BPH w/ urinary retention, stroke (2013), HTN, DM, afib not on ac, dementia, erectile dysfunction, vertigo, leukocytosis of unknown origin 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    # Abnormal EKG  - Appear to have wondering atrial pacemaker  - tachy overnight, no complaints of palpitation   - ECHO noted , normal LV fx  - No need for further inpatient cardiac work up.    # Fall  - No evidence of LOC    # Leukocytosis  - Heme/onc f/u  - Surgery f/u noted    # GOC  - for potential hospice placement   - awaiting family decision

## 2024-01-20 NOTE — PROGRESS NOTE ADULT - SUBJECTIVE AND OBJECTIVE BOX
Patient is a 83y old  Male who presents with a chief complaint of frequent falls, weakness (20 Jan 2024 08:24)      SUBJECTIVE / OVERNIGHT EVENTS: patient  looks comfortable. eyes opened does not follow all commands, says no to if in pain. per RN not eating much and wife requested if can stop Lovenox as for LTC hospice and comfort.    MEDICATIONS  (STANDING):  chlorhexidine 2% Cloths 1 Application(s) Topical <User Schedule>  dextrose 5%. 1000 milliLiter(s) (75 mL/Hr) IV Continuous <Continuous>  dextrose 5%. 1000 milliLiter(s) (75 mL/Hr) IV Continuous <Continuous>  fentaNYL   Patch  12 MICROgram(s)/Hr 1 Patch Transdermal every 72 hours  melatonin 3 milliGRAM(s) Oral at bedtime    MEDICATIONS  (PRN):  morphine  - Injectable 0.5 milliGRAM(s) IV Push every 4 hours PRN Severe Pain (7 - 10)      Vital Signs Last 24 Hrs  T(C): 36.3 (01-20-24 @ 05:05)  T(F): 97.4 (01-20-24 @ 05:05), Max: 97.7 (01-19-24 @ 22:36)  HR: 100 (01-20-24 @ 05:05) (77 - 100)  BP: 112/71 (01-20-24 @ 05:05)  BP(mean): --  RR: 19 (01-20-24 @ 05:05) (16 - 20)  SpO2: 94% (01-20-24 @ 05:05) (93% - 98%)  Wt(kg): --    CAPILLARY BLOOD GLUCOSE        I&O's Summary      PHYSICAL EXAM:  nad comfortable  aox1   soft bs+  cta bl      LABS: no new labs                    RADIOLOGY & ADDITIONAL TESTS:    Imaging Personally Reviewed:    Consultant(s) Notes Reviewed:      Care Discussed with Consultants/Other Providers:    Assessment and Plan:83 year old, Male from home with PMH of BPH w/ urinary retention, stroke (2013), emphysema, afib (not on AC), dementia, erectile dysfunction, vertigo, leukocytosis of unknown origin (currently being worked up at [facility] Dr. 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  Presented s/p fall on the way to his Cardiologist.  Admitted for Ambulatory dysfunction and malignancy workup.  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 d/c Lovenox. start D45 W.   - case d.w Dr. Rodas- patient started on fentanyl pathch and made DNR/DNI-   -will not pursue labs further to provide comfort and avoid unnecessary sticks  -awaiting LTC hospice   -aspiration precautions.   21

## 2024-01-21 NOTE — PROGRESS NOTE ADULT - SUBJECTIVE AND OBJECTIVE BOX
Patient is a 83y old  Male who presents with a chief complaint of frequent falls, weakness (20 Jan 2024 11:58)      SUBJECTIVE / OVERNIGHT EVENTS:    MEDICATIONS  (STANDING):  chlorhexidine 2% Cloths 1 Application(s) Topical <User Schedule>  dextrose 5%. 1000 milliLiter(s) (75 mL/Hr) IV Continuous <Continuous>  fentaNYL   Patch  12 MICROgram(s)/Hr 1 Patch Transdermal every 72 hours  melatonin 3 milliGRAM(s) Oral at bedtime    MEDICATIONS  (PRN):  bisacodyl Suppository 10 milliGRAM(s) Rectal at bedtime PRN Constipation      Vital Signs Last 24 Hrs  T(C): 36.6 (01-21-24 @ 05:48)  T(F): 97.8 (01-21-24 @ 05:48), Max: 97.8 (01-21-24 @ 05:48)  HR: 97 (01-21-24 @ 05:48) (77 - 97)  BP: 125/68 (01-21-24 @ 05:48)  BP(mean): 87 (01-21-24 @ 05:48) (87 - 87)  RR: 17 (01-21-24 @ 05:48) (16 - 19)  SpO2: 93% (01-21-24 @ 05:48) (93% - 97%)  Wt(kg): --    CAPILLARY BLOOD GLUCOSE        I&O's Summary      PHYSICAL EXAM:  GENERAL: NAD, well-developed  HEAD:  Atraumatic, Normocephalic  EYES: EOMI, PERRLA, conjunctiva and sclera clear  NECK: Supple, No JVD  CHEST/LUNG: Clear to auscultation bilaterally; No wheeze  HEART: Regular rate and rhythm; No murmurs, rubs, or gallops  ABDOMEN: Soft, Nontender, Nondistended; Bowel sounds present  EXTREMITIES:  2+ Peripheral Pulses, No clubbing, cyanosis, or edema  PSYCH: AAOx3  NEUROLOGY: non-focal  SKIN: No rashes or lesions    LABS:                    RADIOLOGY & ADDITIONAL TESTS:    Imaging Personally Reviewed:    Consultant(s) Notes Reviewed:      Care Discussed with Consultants/Other Providers:    Assessment and Plan:83 year old, Male from home with PMH of BPH w/ urinary retention, stroke (2013), emphysema, afib (not on AC), dementia, erectile dysfunction, vertigo, leukocytosis of unknown origin (currently being worked up at [facility] Dr. 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  Presented s/p fall on the way to his Cardiologist.  Admitted for Ambulatory dysfunction and malignancy workup.  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 d/c Lovenox. start D45 W.   - case d.w Dr. Rodas- patient started on fentanyl pathch and made DNR/DNI-   -will not pursue labs further to provide comfort and avoid unnecessary sticks  -awaiting LTC hospice          Patient is a 83y old  Male who presents with a chief complaint of frequent falls, weakness (20 Jan 2024 11:58)      SUBJECTIVE / OVERNIGHT EVENTS: looks comfortable. per RN report wife feeds him and he eats otherwise poor po intake, does not look in pain.     MEDICATIONS  (STANDING):  chlorhexidine 2% Cloths 1 Application(s) Topical <User Schedule>  dextrose 5%. 1000 milliLiter(s) (75 mL/Hr) IV Continuous <Continuous>  fentaNYL   Patch  12 MICROgram(s)/Hr 1 Patch Transdermal every 72 hours  melatonin 3 milliGRAM(s) Oral at bedtime    MEDICATIONS  (PRN):  bisacodyl Suppository 10 milliGRAM(s) Rectal at bedtime PRN Constipation      Vital Signs Last 24 Hrs  T(C): 36.6 (01-21-24 @ 05:48)  T(F): 97.8 (01-21-24 @ 05:48), Max: 97.8 (01-21-24 @ 05:48)  HR: 97 (01-21-24 @ 05:48) (77 - 97)  BP: 125/68 (01-21-24 @ 05:48)  BP(mean): 87 (01-21-24 @ 05:48) (87 - 87)  RR: 17 (01-21-24 @ 05:48) (16 - 19)  SpO2: 93% (01-21-24 @ 05:48) (93% - 97%)  Wt(kg): --    CAPILLARY BLOOD GLUCOSE        I&O's Summary      PHYSICAL EXAM:  nad comfortable chronically sick looking  ao1    LABS:                    RADIOLOGY & ADDITIONAL TESTS:    Imaging Personally Reviewed:    Consultant(s) Notes Reviewed:      Care Discussed with Consultants/Other Providers:    Assessment and Plan:83 year old, Male from home with PMH of BPH w/ urinary retention, stroke (2013), emphysema, afib (not on AC), dementia, erectile dysfunction, vertigo, leukocytosis of unknown origin (currently being worked up at [facility] Dr. 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  Presented s/p fall on the way to his Cardiologist.  Admitted for Ambulatory dysfunction and malignancy workup.  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 c/w D45 W.   - case d.w Dr. Rodas- patient started on fentanyl pathch and made DNR/DNI-   -will not pursue labs further to provide comfort and avoid unnecessary sticks  -awaiting LTC hospice

## 2024-01-21 NOTE — PROGRESS NOTE ADULT - ASSESSMENT
Attending addendum:   Agree with above   No further inpatient cardiac workup needed at this time.   Continue with goc discussions with family     Nacho Plaza MD

## 2024-01-21 NOTE — PROGRESS NOTE ADULT - SUBJECTIVE AND OBJECTIVE BOX
DATE OF SERVICE: 01-21-24      pt resting in bed, offers no complaints        bisacodyl Suppository 10 milliGRAM(s) Rectal at bedtime PRN  chlorhexidine 2% Cloths 1 Application(s) Topical <User Schedule>  dextrose 5%. 1000 milliLiter(s) IV Continuous <Continuous>  fentaNYL   Patch  12 MICROgram(s)/Hr 1 Patch Transdermal every 72 hours  melatonin 3 milliGRAM(s) Oral at bedtime          Hemoglobin: 8.2 g/dL (01-17 @ 07:10)            Creatinine Trend: 0.96<--, 0.80<--, 0.92<--, 0.91<--, 0.82<--, 0.90<--    COAGS:           T(C): 36.6 (01-21-24 @ 05:48), Max: 36.6 (01-21-24 @ 05:48)  HR: 97 (01-21-24 @ 05:48) (77 - 97)  BP: 125/68 (01-21-24 @ 05:48) (97/74 - 136/71)  RR: 17 (01-21-24 @ 05:48) (16 - 19)  SpO2: 93% (01-21-24 @ 05:48) (93% - 97%)  Wt(kg): --    I&O's Summary      HEENT:  (-)icterus (-)pallor  CV: N S1 S2 1/6 DANNY (+)2 Pulses B/l  Resp:  Clear to ausculatation B/L, normal effort  GI: (+) BS Soft, NT, ND  Lymph:  (-)Edema, (-)obvious lymphadenopathy  Skin: Warm to touch, Normal turgor  Psych: Unable to adequately assess mood and affect    ASSESSMENT/PLAN: 	83y Male  PMHx of BPH w/ urinary retention, stroke (2013), HTN, DM, afib not on ac, dementia, erectile dysfunction, vertigo, leukocytosis of unknown origin (currently being worked up at [facility] Dr. 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    # Abnormal EKG  - Appear to have wondering atrial pacemaker  - tachy overnight, no complaints of palpitation   - ECHO noted , normal LV fx  - No need for further inpatient cardiac work up.    # Fall  - No evidence of LOC    # Leukocytosis  - Heme/onc f/u  - Surgery f/u noted    # GOC  - for potential hospice placement   - awaiting family decision

## 2024-01-22 NOTE — PROGRESS NOTE ADULT - PROBLEM SELECTOR PLAN 2
CT Abd & Pelvis showed right thigh mass  CT Chest showed extensive progressive metastatic burden to the lungs  Sx/Onc: Not a candidate for excisional biopsy  Unable to tolerate MR for Needle biopsy  Palliative following   **Comfort care only  C/W pain management

## 2024-01-22 NOTE — PROGRESS NOTE ADULT - SUBJECTIVE AND OBJECTIVE BOX
DATE OF SERVICE: 01-22-24    remains somnolent, on fentanyl patch     bisacodyl Suppository 10 milliGRAM(s) Rectal at bedtime PRN  chlorhexidine 2% Cloths 1 Application(s) Topical <User Schedule>  dextrose 5%. 1000 milliLiter(s) IV Continuous <Continuous>  fentaNYL   Patch  12 MICROgram(s)/Hr 1 Patch Transdermal every 72 hours  melatonin 3 milliGRAM(s) Oral at bedtime          Creatinine Trend: 0.96<--, 0.80<--, 0.92<--, 0.91<--, 0.82<--, 0.90<--    COAGS:           T(C): 36.6 (01-22-24 @ 05:41), Max: 36.6 (01-22-24 @ 05:41)  HR: 86 (01-22-24 @ 05:41) (79 - 86)  BP: 130/70 (01-22-24 @ 05:41) (111/47 - 130/70)  RR: 18 (01-22-24 @ 05:41) (16 - 18)  SpO2: 94% (01-22-24 @ 05:41) (94% - 96%)  Wt(kg): --    I&O's Summary      HEENT:  (-)icterus (-)pallor  CV: N S1 S2 1/6 DANNY (+)2 Pulses B/l  Resp:  Clear to ausculatation B/L, normal effort  GI: (+) BS Soft, NT, ND  Lymph:  (-)Edema, (-)obvious lymphadenopathy  Skin: Warm to touch, Normal turgor  Psych: Unable to adequately assess mood and affect    ASSESSMENT/PLAN: 	83y Male  PMHx of BPH w/ urinary retention, stroke (2013), HTN, DM, afib not on ac, dementia, erectile dysfunction, vertigo, leukocytosis of unknown origin (currently being worked up at [facility] Dr. 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    # Abnormal EKG  - Appear to have wondering atrial pacemaker  - tachy overnight, no complaints of palpitation   - ECHO noted , normal LV fx  - No need for further inpatient cardiac work up.    # Fall  - No evidence of LOC    # Leukocytosis  - Heme/onc f/u  - Surgery f/u noted    # GOC  - started on fentanyl patch, no further blood draws  - goal appears to be comfort at this point  - palliative f/u  - I will sign off please call back if needed    Fredi Wilson MD, [facility]  BEEPER [phone]

## 2024-01-22 NOTE — PROGRESS NOTE ADULT - PROBLEM SELECTOR PLAN 2
Now with superimposed delirium/metabolic encephalopathy, likely multi-factorial in the setting of metastatic cancer and hypercalcemia.  Patient is currently bedbound and total care, equivalent to FAST stage 7A.  PPS 30%.  Hospice appropriate, low threshold for IPU, currently stable on Fentanyl patch 12 mcg    Continue supportive care.  Consider D/C donepezil given patient's end stage disease

## 2024-01-22 NOTE — PROGRESS NOTE ADULT - PROBLEM SELECTOR PLAN 6
Clinical evidence indicates that the patient has Severe protein calorie malnutrition/ 3rd degree    In context of      Chronic Illness (>1 month)--progressive vascular dementia, now with metastatic cancer of unknown primary site, as evidenced by:    Energy/Food intake <50% of estimated energy requirement >5 days  Weight loss: Moderate - severe (lbs lost recently)  Body Fat loss: Severe   + mild temporal wasting  Muscle mass loss: Severe  -- albumin now 1.2   Strength: weakened severe (bedbound)    Recommend:   Continue soft and bite sized diet as tolerated - aspiration precautions, careful hand-feeding, teaching to caregivers  On supplementation with Ensure Enlive BID    See [facility] discussion above--wife has now elected for no feeding tube

## 2024-01-22 NOTE — PROGRESS NOTE ADULT - ASSESSMENT
83 y.o M w/ PMHx of BPH w/ urinary retention, stroke (2013), HTN, DM, afib not on A/C, dementia (on home Aricept), erectile dysfunction, vertigo, leukocytosis of unknown origin (currently followed by Dr. 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  Initial ER labs were notable for WBC of 47, albumin of 1.7, and hypercalcemia (corrected calcium of 13).  CXR showed scattered small pulmonary infiltrates, no fractures identified on additional x-ray imaging, CT of head was negative for acute intracranial infarct/hemorrhage/mass  (+ minimal anterior periventricular white matter ischemic changes).      Patient was admitted for ambulatory dysfunction, hypercalcemia, and malignancy work-up.  Heme/Onc consulted; per their notes patient with previous BM biopsy in Dec. 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  CT of chest/abd/pelvis (1/7) showed increasing metastatic disease burden to the lungs.  Surgical Oncology consulted, but patient no longer considered a candidate for excisional biopsy of leg mass due to worsening functional/mental status.  IR consulted for possible core biopsy of R thigh mass, but patient unable to tolerate MRI yesterday 1/9 despite sedation with IV Ativan, possible IR mass of lung biopsy now being considered.  Palliative Care service consulted for medical decision making and additional GOC discussion.

## 2024-01-22 NOTE — PROGRESS NOTE ADULT - PROBLEM SELECTOR PLAN 4
H/o Leukocytosis, prior w/ Dr. Pugh @ [facility], negative outpt leukemia workup  P/w WBC 47.92 on admission  Likely reactive in s/o malignancy   No further blood draws  **Comfort care only

## 2024-01-22 NOTE — PROGRESS NOTE ADULT - PROBLEM SELECTOR PLAN 4
Needs to be seen in office or urgent care if sick Symptoms well managed, continue Fentanyl patch 12 mcg  Continue IV morphine 0.5 mg Q 4 hrs PRN, titrate as needed    Bowel regimen in place with Senna and Dulcolax, may need to consider Dulcolax supp PRN

## 2024-01-22 NOTE — PROGRESS NOTE ADULT - PROBLEM SELECTOR PLAN 7
As above.  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  ECOG 4  with PPS for 30%.  He is a poor candidate for any cancer directed treatment (systemic or otherwise).  Hospice appropriate with prognosis of weeks to  months.  Low threshold for IPU, although symptoms appear reasonably managed at this time    See previous GOC discussions--wife now in agreement with DNR, DNI, DNH, and no PEG    Remainder of management as per primary medical team  Continue Fentanyl patch 12 mcg  Continue IV lorazepam PRN agitation and IV morphine PRN pain  CMO/MEWS exempt  Ongoing collaboration with Pall Care team SW   D/C planning for LTC with hospice   Discussed with primary team and Dr. Payan in detail  Palliative Care team will continue to follow.

## 2024-01-22 NOTE — PROGRESS NOTE ADULT - PROBLEM SELECTOR PLAN 1
CT with worsening metastatic disease to lungs  Has R thigh mass concerning for sarcoma, also with concern for primary prostate Ca  [facility] also discussed patient with Dr. Pugh previously by phone     Patient is currently ECOG 4 with poor functional status and worsening delirium/encephalopathy on top of presumed vascular dementia.  He is now essentially bedbound and has become increasingly obtunded.  He would likely now require IR biopsy of a lung nodule for tissue diagnosis (risks far outweigh benefits).  Even if a responsive tumor was found, I do not see how patient would be able to tolerate chemotherapy or any type of cancer directed treatment.  He is medically appropriate for hospice with likely prognosis of weeks to less than 2-3 months.  Does not meet IPU criteria at this time).    See GOC discussion from 1/10, 1/12, and 1/17- wife now in agreement for DNR, DNI, DNH, and no PEG  Patient remains very comfortable on Fentanyl patch  Will add orders for CMO/MEWS exempt  Continue with D/C planning for LTC with hospice  Continue supportive care

## 2024-01-22 NOTE — PROGRESS NOTE ADULT - ASSESSMENT
83 year old, Male from home with PMH of BPH w/ urinary retention, stroke (2013), emphysema, afib (not on AC), dementia, erectile dysfunction, vertigo, leukocytosis of unknown origin. Prior to admission was being worked up at [facility] Dr. 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  Presented s/p fall on the way to his Cardiologist.  Admitted for Ambulatory dysfunction and malignancy workup.  Palliative following Pt is now DNR/DNI and comfort measures pending LTC with Hospice transfer

## 2024-01-22 NOTE — PROGRESS NOTE ADULT - SUBJECTIVE AND OBJECTIVE BOX
follow up on:  complex medical decision making related to goals of care    [facility] Geriatric and Palliative Consult Service:  Tamela Vasquez DO: cell ([phone])  Raf Rodas MD: cell ([phone])  Benito Osorio NP: cell [phone])   Toni Rico LMSW: cell ([phone])   Sharon Rousseau NP: via [employer] Teams    Can contact any Palliative Team member via [employer] Teams for consults and questions      OVERNIGHT EVENTS:    Present Symptoms: Mild, Moderate, Severe  Pain:             Location -                               Aggravating factors -             Quality -             Radiation -             Timing-             Severity (0-10 scale):             Minimal acceptable level (0-10 scale):  Fatigue:  Nausea:  Lack of Appetite:   SOB:  Depression:  Anxiety:  Review of Systems: [All others negative or Unable to obtain due to poor mentation]    CPOT:    https://ccs-st.org/resources/Documents/Sedation%20Analgesia%20Delirium%20in%20CC/CCS%20STH%20Guideline%20template%20CPOT.pdf  PAIN AD Score:   [email or web address] (press ctrl +  left click to view)MEDICATIONS  (STANDING):  chlorhexidine 2% Cloths 1 Application(s) Topical <User Schedule>  dextrose 5%. 1000 milliLiter(s) (75 mL/Hr) IV Continuous <Continuous>  fentaNYL   Patch  12 MICROgram(s)/Hr 1 Patch Transdermal every 72 hours  melatonin 3 milliGRAM(s) Oral at bedtime    MEDICATIONS  (PRN):  bisacodyl Suppository 10 milliGRAM(s) Rectal at bedtime PRN Constipation  LORazepam   Injectable 1 milliGRAM(s) IV Push every 6 hours PRN Agitation  morphine  - Injectable 0.5 milliGRAM(s) IV Push every 4 hours PRN Severe Pain (7 - 10)      PHYSICAL EXAM:  Vital Signs Last 24 Hrs  T(C): 37.1 (22 Jan 2024 14:46), Max: 37.1 (22 Jan 2024 14:46)  T(F): 98.8 (22 Jan 2024 14:46), Max: 98.8 (22 Jan 2024 14:46)  HR: 68 (22 Jan 2024 14:46) (68 - 86)  BP: 127/61 (22 Jan 2024 14:46) (111/47 - 130/70)  BP(mean): 90 (22 Jan 2024 05:41) (68 - 90)  RR: 16 (22 Jan 2024 14:46) (16 - 18)  SpO2: 94% (22 Jan 2024 14:46) (94% - 96%)    Parameters below as of 22 Jan 2024 14:46  Patient On (Oxygen Delivery Method): nasal cannula  O2 Flow (L/min): 2      General: alert  oriented x ____    lethargic distressed cachexia  verbal nonverbal  unarousable     Palliative Performance Scale/Karnofsky Score:  http://npcrc.org/files/news/palliative_performance_scale_ppsv2.pdf    HEENT: no abnormal lesion, dry mouth  ET tube/trach oral lesions:  Lungs: tachypnea/labored breathing, audible excessive secretions  CV: RRR, S1S2, tachycardia  GI: soft non distended non tender  incontinent               PEG/NG/OG tube  constipation  last BM:   : incontinent  oliguria/anuria  mckeon  Musculoskeletal: weakness x4 edema x4    ambulatory with assistance   mostly/fully bedbound/wheelchair bound  Skin: no abnormal skin lesions, poor skin turgor, pressure ulcers stage:   Neuro: no deficits, mild cognitive impairment dsyphagia/dysarthria paresis  Oral intake ability: unable/only mouth care, minimal moderate full capability    LABS:                RADIOLOGY & ADDITIONAL STUDIES: follow up on:  complex medical decision making related to goals of care    [facility] Geriatric and Palliative Consult Service:  Tamela Vasquez DO: cell ([phone])  Raf Rodas MD: cell ([phone])  Benito Osorio NP: cell ([phone])   Toni Rico LMSW: cell ([phone])   Sharon Rousseau NP: via [employer] Teams    Can contact any Palliative Team member via [employer] Teams for consults and questions      OVERNIGHT EVENTS:  None.  No recent PRN medication use    Patient examined late this afternoon with wife at bedside.  Remains alert and oriented x zero, lethargic, intermittently responsive.  Appears very comfortable.  Continued poor oral intake, wife states that patient took a few more spoonfuls of food today.  No apparent signs of pain or SOB.  No other acute issues reported by wife or bedside nursing staff    Present Symptoms: Mild, Moderate, Severe  Pain:  Unable to verbalize, CPOT 0                      Review of Systems:   Unable to obtain due to poor mentation/dementia    CPOT:  0  https://ccs-st.org/resources/Documents/Sedation%20Analgesia%20Delirium%20in%20CC/CCS%20STH%20Guideline%20template%20CPOT.pdf  PAIN AD Score:  0  [email or web address]/[email or web address] (press ctrl +  left click to view)      MEDICATIONS  (STANDING):  chlorhexidine 2% Cloths 1 Application(s) Topical <User Schedule>  dextrose 5%. 1000 milliLiter(s) (75 mL/Hr) IV Continuous <Continuous>  fentaNYL   Patch  12 MICROgram(s)/Hr 1 Patch Transdermal every 72 hours  melatonin 3 milliGRAM(s) Oral at bedtime    MEDICATIONS  (PRN):  bisacodyl Suppository 10 milliGRAM(s) Rectal at bedtime PRN Constipation  LORazepam   Injectable 1 milliGRAM(s) IV Push every 6 hours PRN Agitation  morphine  - Injectable 0.5 milliGRAM(s) IV Push every 4 hours PRN Severe Pain (7 - 10)      PHYSICAL EXAM:  Vital Signs Last 24 Hrs  T(C): 37.1 (22 Jan 2024 14:46), Max: 37.1 (22 Jan 2024 14:46)  T(F): 98.8 (22 Jan 2024 14:46), Max: 98.8 (22 Jan 2024 14:46)  HR: 68 (22 Jan 2024 14:46) (68 - 86)  BP: 127/61 (22 Jan 2024 14:46) (111/47 - 130/70)  BP(mean): 90 (22 Jan 2024 05:41) (68 - 90)  RR: 16 (22 Jan 2024 14:46) (16 - 18)  SpO2: 94% (22 Jan 2024 14:46) (94% - 96%)    Parameters below as of 22 Jan 2024 14:46  Patient On (Oxygen Delivery Method): nasal cannula  O2 Flow (L/min): 2      General: alert  oriented x _0_    lethargic   cachectic with temporal wasting, NAD    Palliative Performance Scale/Karnofsky Score:  20%  http://npcrc.org/files/news/palliative_performance_scale_ppsv2.pdf    HEENT:  Anicteric, pharynx clear, MM sl dry  Lungs:  clear to auscultation, respirations unlabored  CV: RRR,  normal S1 and S2, no murmur  GI: soft non distended non tender   + normal bowel sounds  : incontinent    Musculoskeletal:  weakness x4  in all extremities, essentially bedbound, ++ hard mass of R medial thigh,  no edema, no tenderness elicited on exam  Skin: no abnormal skin lesions or DU noted  Neuro: no focal deficits, ++ severe cognitive impairment  + dysphagia   Oral intake ability:  minimal  capability, on soft and bite sized diet      LABS:    Albumin: 1.2 g/dL (01.17.24 @ 07:10)        RADIOLOGY & ADDITIONAL STUDIES:

## 2024-01-22 NOTE — PROGRESS NOTE ADULT - PROBLEM SELECTOR PLAN 1
Pt lethargic with decreased PO intake  C/W feed assist with aspiration precautions.    Palliative following  No more lab draws  **Comfort care only

## 2024-01-22 NOTE — PROGRESS NOTE ADULT - SUBJECTIVE AND OBJECTIVE BOX
Patient is a 83y old  Male who presents with a chief complaint of frequent falls, weakness (21 Jan 2024 08:57)      INTERVAL HPI/OVERNIGHT EVENTS: no new complaints    MEDICATIONS  (STANDING):  chlorhexidine 2% Cloths 1 Application(s) Topical <User Schedule>  dextrose 5%. 1000 milliLiter(s) (75 mL/Hr) IV Continuous <Continuous>  fentaNYL   Patch  12 MICROgram(s)/Hr 1 Patch Transdermal every 72 hours  melatonin 3 milliGRAM(s) Oral at bedtime    MEDICATIONS  (PRN):  bisacodyl Suppository 10 milliGRAM(s) Rectal at bedtime PRN Constipation      __________________________________________________  REVIEW OF SYSTEMS:    CONSTITUTIONAL: No fever,   EYES: no acute visual disturbances  NECK: No pain or stiffness  RESPIRATORY: No cough; No shortness of breath  CARDIOVASCULAR: No chest pain, no palpitations  GASTROINTESTINAL: No pain. No nausea or vomiting; No diarrhea   NEUROLOGICAL: No headache or numbness, no tremors  MUSCULOSKELETAL: No joint pain, no muscle pain  GENITOURINARY: no dysuria, no frequency, no hesitancy  PSYCHIATRY: no depression , no anxiety  ALL OTHER  ROS negative      Vital Signs Last 24 Hrs  T(C): 36.6 (22 Jan 2024 05:41), Max: 36.6 (22 Jan 2024 05:41)  T(F): 97.9 (22 Jan 2024 05:41), Max: 97.9 (22 Jan 2024 05:41)  HR: 86 (22 Jan 2024 05:41) (79 - 86)  BP: 130/70 (22 Jan 2024 05:41) (111/47 - 130/70)  BP(mean): 90 (22 Jan 2024 05:41) (68 - 90)  RR: 18 (22 Jan 2024 05:41) (16 - 18)  SpO2: 94% (22 Jan 2024 05:41) (94% - 96%)    Parameters below as of 22 Jan 2024 05:41  Patient On (Oxygen Delivery Method): nasal cannula  O2 Flow (L/min): 2      ________________________________________________  PHYSICAL EXAM:  GENERAL: NAD  HEENT: Normocephalic;  conjunctivae and sclerae clear; moist mucous membranes;   NECK : supple  CHEST/LUNG: Clear to auscultation bilaterally with good air entry   HEART: S1 S2  regular; no murmurs, gallops or rubs  ABDOMEN: Soft, Nontender, Nondistended; Bowel sounds present  EXTREMITIES: no cyanosis; no edema; no calf tenderness  SKIN: warm and dry; no rash  NERVOUS SYSTEM:  Awake and alert; Oriented  to place, person and time ; no new deficits    _________________________________________________  LABS:              CAPILLARY BLOOD GLUCOSE          RADIOLOGY & ADDITIONAL TESTS:    Imaging Personally Reviewed:  YES/NO    Consultant(s) Notes Reviewed:   YES/ No    Care Discussed with Consultants :     Plan of care was discussed with patient and /or primary care giver; all questions and concerns were addressed and care was aligned with patient's wishes.       Patient is a 83y old  Male who presents with a chief complaint of frequent falls, weakness (21 Jan 2024 08:57)      INTERVAL HPI/OVERNIGHT EVENTS: Pt seen at bedside, pt lethargic but responsive to touch. Spouse reports limited oral intake with feed assist    MEDICATIONS  (STANDING):  chlorhexidine 2% Cloths 1 Application(s) Topical <User Schedule>  dextrose 5%. 1000 milliLiter(s) (75 mL/Hr) IV Continuous <Continuous>  fentaNYL   Patch  12 MICROgram(s)/Hr 1 Patch Transdermal every 72 hours  melatonin 3 milliGRAM(s) Oral at bedtime    MEDICATIONS  (PRN):  bisacodyl Suppository 10 milliGRAM(s) Rectal at bedtime PRN Constipation  __________________________________________________  REVIEW OF SYSTEMS:    unable to assess in lethargic patient     Vital Signs Last 24 Hrs  T(C): 36.6 (22 Jan 2024 05:41), Max: 36.6 (22 Jan 2024 05:41)  T(F): 97.9 (22 Jan 2024 05:41), Max: 97.9 (22 Jan 2024 05:41)  HR: 86 (22 Jan 2024 05:41) (79 - 86)  BP: 130/70 (22 Jan 2024 05:41) (111/47 - 130/70)  BP(mean): 90 (22 Jan 2024 05:41) (68 - 90)  RR: 18 (22 Jan 2024 05:41) (16 - 18)  SpO2: 94% (22 Jan 2024 05:41) (94% - 96%)    Parameters below as of 22 Jan 2024 05:41  Patient On (Oxygen Delivery Method): nasal cannula  O2 Flow (L/min): 2  ________________________________________________  PHYSICAL EXAM: limited for comfort care   GENERAL: NAD  CHEST/LUNG: Clear to auscultation bilaterally with good air entry   HEART: S1 S2  regular; no murmurs, gallops or rubs  EXTREMITIES: no cyanosis; no edema  SKIN: warm and dry  NERVOUS SYSTEM:  lethargic, nonverbal    _________________________________________________    Plan of care was discussed with patient and /or primary care giver; all questions and concerns were addressed and care was aligned with patient's wishes.

## 2024-01-22 NOTE — PROGRESS NOTE ADULT - NS ATTEND AMEND GEN_ALL_CORE FT
83 year old, Male from home with PMH of BPH w/ urinary retention, stroke (2013), emphysema, afib (not on AC), dementia, erectile dysfunction, vertigo, leukocytosis of unknown origin (currently being worked up at [facility] Dr. 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  Presented s/p fall on the way to his Cardiologist.  Admitted for Ambulatory dysfunction and malignancy workup.  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 does not respond to verbal commands.  - case d.w Dr. Rodas- patient started on fentanyl pathch and made DNR/DNI-   -will not pursue labs further to provide comfort and avoid unnecessary sticks  -awaiting LTC hospice

## 2024-01-23 NOTE — CHART NOTE - NSCHARTNOTEFT_GEN_A_CORE
Discussed with Dr. Brito  Patient medically stable, awaiting discharge to LTC with hospice  Symptoms adequately managed on Fentanyl patch at this time    No other additional/acute palliative care needs identified.  Palliative Care team will sign off.  Please reconsult PRN

## 2024-01-23 NOTE — PROGRESS NOTE ADULT - ASSESSMENT
83 year old, Male from home with PMH of BPH w/ urinary retention, stroke (2013), emphysema, afib (not on AC), dementia, erectile dysfunction, vertigo, leukocytosis of unknown origin. Prior to admission was being worked up at [facility] Dr. 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  Presented s/p fall on the way to his Cardiologist.  Admitted for Ambulatory dysfunction and malignancy workup.  Palliative following Pt is now DNR/DNI and comfort measures pending LTC with Hospice transfer

## 2024-01-23 NOTE — PROGRESS NOTE ADULT - SUBJECTIVE AND OBJECTIVE BOX
Patient is a 83y old  Male who presents with a chief complaint of frequent falls, weakness (22 Jan 2024 17:44)      INTERVAL HPI/OVERNIGHT EVENTS: No acute events overnight       REVIEW OF SYSTEMS: unable to assess due to pt mental status       T(C): 36.7 (01-23-24 @ 05:00), Max: 37.1 (01-22-24 @ 14:46)  HR: 74 (01-23-24 @ 05:00) (68 - 74)  BP: 138/55 (01-23-24 @ 05:00) (127/61 - 138/55)  RR: 17 (01-23-24 @ 05:00) (16 - 18)  SpO2: 96% (01-23-24 @ 05:00) (94% - 98%)  Wt(kg): --Vital Signs Last 24 Hrs  T(C): 36.7 (23 Jan 2024 05:00), Max: 37.1 (22 Jan 2024 14:46)  T(F): 98.1 (23 Jan 2024 05:00), Max: 98.8 (22 Jan 2024 14:46)  HR: 74 (23 Jan 2024 05:00) (68 - 74)  BP: 138/55 (23 Jan 2024 05:00) (127/61 - 138/55)  BP(mean): --  RR: 17 (23 Jan 2024 05:00) (16 - 18)  SpO2: 96% (23 Jan 2024 05:00) (94% - 98%)    Parameters below as of 23 Jan 2024 05:00  Patient On (Oxygen Delivery Method): nasal cannula  O2 Flow (L/min): 2    MEDICATIONS  (STANDING):  chlorhexidine 2% Cloths 1 Application(s) Topical <User Schedule>  dextrose 5%. 1000 milliLiter(s) (75 mL/Hr) IV Continuous <Continuous>  fentaNYL   Patch  12 MICROgram(s)/Hr 1 Patch Transdermal every 72 hours  melatonin 3 milliGRAM(s) Oral at bedtime    MEDICATIONS  (PRN):  bisacodyl Suppository 10 milliGRAM(s) Rectal at bedtime PRN Constipation  LORazepam   Injectable 1 milliGRAM(s) IV Push every 6 hours PRN Agitation  morphine  - Injectable 0.5 milliGRAM(s) IV Push every 4 hours PRN Severe Pain (7 - 10)    PHYSICAL EXAM:  GENERAL: NAD  EYES: clear conjunctiva; EOMI  ENMT: Moist mucous membranes  NECK: Supple, No JVD, Normal thyroid  CHEST/LUNG: Clear to auscultation bilaterally; No rales, rhonchi, wheezing, or rubs  HEART: S1, S2, Regular rate and rhythm  ABDOMEN: Soft, Nontender, Nondistended; Bowel sounds present  NEURO: lethargic   EXTREMITIES: No LE edema, no calf tenderness  LYMPH: No lymphadenopathy noted  SKIN: No rashes or lesions    Consultant(s) Notes Reviewed:  [x ] YES  [ ] NO  Care Discussed with Consultants/Other Providers [ x] YES  [ ] NO    LABS:      CAPILLARY BLOOD GLUCOSE      RADIOLOGY & ADDITIONAL TESTS:    Imaging Personally Reviewed:  [ x] YES  [ ] NO  < from: CT Abdomen and Pelvis w/ IV Cont (01.07.24 @ 13:41) >    ACC: [id number] EXAM:  CT ABDOMEN AND PELVIS IC   ORDERED BY: JACKI JARQUIN     ACC: [id number] EXAM:  CT CHEST IC   ORDERED BY: JACKI JARQUIN     PROCEDURE DATE:  01/07/2024          INTERPRETATION:  CLINICAL INFORMATION: Right leg malignancy.    COMPARISON: Outside contrast-enhanced CT of the thorax, abdomen, and   pelvis November 6, 2023.    CONTRAST/COMPLICATIONS:  IV Contrast: Omnipaque 350 (accession [id number]), IV contrast documented   in unlinked concurrent exam (accession [id number])90 cc administered   10   cc discarded  Oral Contrast: NONE  Complications: None reported at time of study completion    PROCEDURE:  CT of the Chest, Abdomen and Pelvis was performed.  Sagittal and coronal reformats were performed.    FINDINGS:  CHEST:  LUNGS AND LARGE AIRWAYS: Patent central airways. There is been interval   increase in size and number of numerous centrally necrotic lung masses   measuring up to 3.7 cm in the left lower lobe compatible with metastatic   disease. Mild right lowerlobe dependent atelectasis.  PLEURA: Trace bilateral layering pleural effusions, right greater than   left.  VESSELS: Within normal limits.  HEART: Heart size is normal. No pericardial effusion.  MEDIASTINUM AND REMY: No lymphadenopathy.  CHEST WALL AND LOWER NECK: Within normal limits.    ABDOMEN AND PELVIS:  LIVER: Within normal limits.  BILE DUCTS: Normal caliber.  GALLBLADDER: Within normal limits.  SPLEEN: Within normal limits.  PANCREAS: Within normal limits.  ADRENALS: Within normal limits.  KIDNEYS/URETERS: Left renal cysts. Otherwise, within normal limits.    BLADDER: Within normal limits.  REPRODUCTIVE ORGANS: The prostate is not enlarged.    BOWEL: Colonic diverticulosis. No bowel obstruction. Appendix is normal.   There is no mesenteric adenopathy.  PERITONEUM: No ascites.  VESSELS: Within normal limits.  RETROPERITONEUM/LYMPH NODES: No lymphadenopathy.  ABDOMINAL WALL: Small fat-containing left inguinal hernia.  BONES: Mild anterolisthesis of L4 on L5. Degenerative disc disease at   L5-S1.. There is been interval increase in size of an incompletely   visualized peripherally enhancing mass in the right medial thigh.    IMPRESSION: Extensive progressive metastatic burden to the lungs.    --- End of Report ---            ARLIN XIAO MD; Attending Radiologist  This document has been electronically signed. Jan 7 2024  5:10PM    < end of copied text >

## 2024-01-23 NOTE — PROGRESS NOTE ADULT - NS ATTEND AMEND GEN_ALL_CORE FT
83 year old, Male from home with PMH of BPH w/ urinary retention, stroke (2013), emphysema, afib (not on AC), dementia, erectile dysfunction, vertigo, leukocytosis of unknown origin (currently being worked up at [facility] Dr. 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  Admitted for Ambulatory dysfunction and malignancy workup.  Palliative initially consulted, pt is now DNR/DNI and comfort measures.     No acute events overnight. Patient seen and examined at bedside this morning, with former daughter in law present as well.  Vitals reviewed, normotensive this morning. On exam: elderly male, lying in bed, NAD, arousable but not turning to voice, does not speak or respond to commands.     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 Rodas- patient on fentanyl patch, to be renewed for tomorrow  -will not pursue labs further to provide comfort and avoid unnecessary sticks  -pleasure feeds as tolerated  -awaiting LTC/hospice  -SW on board, assisting with placement in hospice  -DNR/DNI

## 2024-01-23 NOTE — PROGRESS NOTE ADULT - PROBLEM SELECTOR PLAN 4
H/o Leukocytosis, prior w/ Dr. Pugh @ [facility], negative outpt leukemia workup  P/w WBC 47.92 on admission  Likely reactive in s/o malignancy   No further blood draws  **Comfort care only

## 2024-01-24 NOTE — PROGRESS NOTE ADULT - NS ATTEND AMEND GEN_ALL_CORE FT
83 year old, Male from home with PMH of BPH w/ urinary retention, stroke (2013), emphysema, afib (not on AC), dementia, erectile dysfunction, vertigo, leukocytosis of unknown origin (currently being worked up at [facility] Dr. 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  Admitted for Ambulatory dysfunction and malignancy workup. Palliative initially consulted, pt is now DNR/DNI and comfort measures.     No acute events overnight. Patient seen and examined at bedside this morning.  Vitals reviewed, normotensive this morning. Breathing 100% on 3L NC. On exam: elderly male, lying in bed, NAD, arousable but not turning to voice, does not speak or respond to commands.     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 Rodas- patient on fentanyl patch, to be renewed today  -will not pursue labs further to provide comfort and avoid unnecessary sticks  -pleasure feeds as tolerated  -awaiting LTC/hospice  -SW on board, assisting with placement in hospice  -DNR/DNI

## 2024-01-24 NOTE — PROGRESS NOTE ADULT - PROBLEM SELECTOR PLAN 2
H/o Leukocytosis, prior w/ Dr. Pugh @ [facility], negative outpt leukemia workup  P/w WBC 47.92 on admission  Likely reactive in s/o malignancy   No further blood draws  **Comfort care only

## 2024-01-24 NOTE — PROGRESS NOTE ADULT - SUBJECTIVE AND OBJECTIVE BOX
Patient is a 83y old  Male who presents with a chief complaint of frequent falls, weakness (23 Jan 2024 09:22)      INTERVAL HPI/OVERNIGHT EVENTS: No acute events overnight       REVIEW OF SYSTEMS: unable to assess due to pt mental status      T(C): 36.9 (01-24-24 @ 04:59), Max: 36.9 (01-24-24 @ 04:59)  HR: 96 (01-24-24 @ 04:59) (86 - 96)  BP: 116/50 (01-24-24 @ 04:59) (92/51 - 116/50)  RR: 20 (01-24-24 @ 04:59) (19 - 20)  SpO2: 94% (01-24-24 @ 04:59) (94% - 97%)  Wt(kg): --Vital Signs Last 24 Hrs  T(C): 36.9 (24 Jan 2024 04:59), Max: 36.9 (24 Jan 2024 04:59)  T(F): 98.5 (24 Jan 2024 04:59), Max: 98.5 (24 Jan 2024 04:59)  HR: 96 (24 Jan 2024 04:59) (86 - 96)  BP: 116/50 (24 Jan 2024 04:59) (92/51 - 116/50)  BP(mean): --  RR: 20 (24 Jan 2024 04:59) (19 - 20)  SpO2: 94% (24 Jan 2024 04:59) (94% - 97%)    Parameters below as of 24 Jan 2024 04:59  Patient On (Oxygen Delivery Method): nasal cannula  O2 Flow (L/min): 2    MEDICATIONS  (STANDING):  chlorhexidine 2% Cloths 1 Application(s) Topical <User Schedule>  dextrose 5%. 1000 milliLiter(s) (75 mL/Hr) IV Continuous <Continuous>  fentaNYL   Patch  12 MICROgram(s)/Hr 1 Patch Transdermal every 72 hours  melatonin 3 milliGRAM(s) Oral at bedtime    MEDICATIONS  (PRN):  bisacodyl Suppository 10 milliGRAM(s) Rectal at bedtime PRN Constipation  LORazepam   Injectable 1 milliGRAM(s) IV Push every 6 hours PRN Agitation  morphine  - Injectable 0.5 milliGRAM(s) IV Push every 4 hours PRN Severe Pain (7 - 10)    PHYSICAL EXAM:  GENERAL: NADm breathing comfortably   EYES: clear conjunctiva; EOMI  ENMT: Moist mucous membranes  NECK: Supple, No JVD, Normal thyroid  CHEST/LUNG: Clear to auscultation bilaterally; No rales, rhonchi, wheezing, or rubs  HEART: S1, S2, Regular rate and rhythm  ABDOMEN: Soft, Nontender, Nondistended; Bowel sounds present  NEURO: lethargic   EXTREMITIES: No LE edema, no calf tenderness  LYMPH: No lymphadenopathy noted  SKIN: No rashes or lesions    Consultant(s) Notes Reviewed:  [x ] YES  [ ] NO  Care Discussed with Consultants/Other Providers [ x] YES  [ ] NO    LABS:            CAPILLARY BLOOD GLUCOSE                RADIOLOGY & ADDITIONAL TESTS:    Imaging Personally Reviewed:  [ ] YES  [ ] NO

## 2024-01-24 NOTE — PROGRESS NOTE ADULT - ASSESSMENT
83 year old, Male from home with PMH of BPH w/ urinary retention, stroke (2013), emphysema, afib (not on AC), dementia, erectile dysfunction, vertigo, leukocytosis of unknown origin. Prior to admission was being worked up at [facility] Dr. 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  Presented s/p fall on the way to his Cardiologist.  Admitted for Ambulatory dysfunction and malignancy workup.  Palliative following Pt is now DNR/DNI and comfort measures pending LTC with Hospice transfer

## 2024-01-25 NOTE — PROGRESS NOTE ADULT - PROBLEM SELECTOR PLAN 2
H/o Leukocytosis, prior w/ Dr. Pugh @ [facility], negative outpt leukemia workup  P/w WBC 47.92 on admission  Likely reactive in s/o malignancy   No further blood draws  **Comfort care only

## 2024-01-25 NOTE — PROGRESS NOTE ADULT - NUTRITIONAL ASSESSMENT
This patient has been assessed with a concern for Malnutrition and has been determined to have a diagnosis/diagnoses of Severe protein-calorie malnutrition.    This patient is being managed with:   Diet Soft and Bite Sized-  Supplement Feeding Modality:  Oral  Ensure Enlive Cans or Servings Per Day:  1       Frequency:  Two Times a day  Entered: Jan 7 2024 10:24AM  

## 2024-01-25 NOTE — CHART NOTE - NSCHARTNOTESELECT_GEN_ALL_CORE
Event Note
Event Note
Internal Medicine/Event Note
Palliative Care Attending/Event Note
Nutrition Services
Nutrition Services

## 2024-01-25 NOTE — PROGRESS NOTE ADULT - ASSESSMENT
83 year old, Male from home with PMH of BPH w/ urinary retention, stroke (2013), emphysema, afib (not on AC), dementia, erectile dysfunction, vertigo, leukocytosis of unknown origin. Prior to admission was being worked up at [facility] Dr. 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  Presented s/p fall on the way to his Cardiologist.  Admitted for Ambulatory dysfunction and malignancy workup.  Palliative following Pt is now DNR/DNI and comfort measures pending LTC with Hospice transfer

## 2024-01-25 NOTE — PROGRESS NOTE ADULT - NS ATTEND OPT1 GEN_ALL_CORE
I independently performed the documented:
I attest my time as attending is greater than 50% of the total combined time spent on qualifying patient care activities by the PA/NP and attending.
I independently performed the documented:
I attest my time as attending is greater than 50% of the total combined time spent on qualifying patient care activities by the PA/NP and attending.

## 2024-01-25 NOTE — PROGRESS NOTE ADULT - NS ATTEND AMEND GEN_ALL_CORE FT
83 year old, Male from home with PMH of BPH w/ urinary retention, stroke (2013), emphysema, afib (not on AC), dementia, erectile dysfunction, vertigo, leukocytosis of unknown origin (currently being worked up at [facility] Dr. 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  Admitted for Ambulatory dysfunction and malignancy workup. Palliative initially consulted, pt is now DNR/DNI and comfort measures.     No acute events overnight. Patient seen and examined at bedside this morning.  Vitals reviewed, normotensive this morning, slight tachycardia noted but now back to regular rate. Breathing 100% on 3L NC. On exam: elderly male, lying in bed, NAD, sleeping comfortably, not turning to voice, or respond to commands.     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 Rodas- patient on fentanyl patch, renewed 1/24  -will not pursue labs further to provide comfort and avoid unnecessary sticks  -pleasure feeds as tolerated  -awaiting LTC/hospice  -SW on board, assisting with placement in hospice  -DNR/DNI

## 2024-01-25 NOTE — CHART NOTE - NSCHARTNOTEFT_GEN_A_CORE
EVENT: Temp 39 now decrease to 38.3    BRIEF HPI: 83 year old, Male from home with PMH of BPH w/ urinary retention, stroke (2013), emphysema, afib (not on AC), dementia, erectile dysfunction, vertigo, leukocytosis of unknown origin. Prior to admission was being worked up at [facility] Dr. 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  Presented s/p fall on the way to his Cardiologist.  Admitted for Ambulatory dysfunction and malignancy workup.  Palliative following Pt is now DNR/DNI and comfort measures pending LTC with Hospice transfer.      OBJECTIVE:  Vital Signs Last 24 Hrs  T(C): 38.3 (25 Jan 2024 22:53), Max: 39 (25 Jan 2024 20:48)  T(F): 101 (25 Jan 2024 22:53), Max: 102.2 (25 Jan 2024 20:48)  HR: 114 (25 Jan 2024 20:48) (82 - 114)  BP: 94/45 (25 Jan 2024 20:48) (94/45 - 107/53)  BP(mean): --  RR: 19 (25 Jan 2024 20:48) (19 - 20)  SpO2: 84% (25 Jan 2024 20:48) (84% - 94%)    Parameters below as of 25 Jan 2024 20:48  Patient On (Oxygen Delivery Method): nasal cannula  O2 Flow (L/min): 3      FOCUSED PHYSICAL EXAM:  NEURO: non interactive  CV: S1 S2, tach  RESP: Even, unlabored    PLAN:   Cooling measures  Cont acetaminophen  Suppository  650 tony GRAM(s) Rectal every 6 hours PRN Temp greater or equal to 38C (100.4F)    FOLLOW UP / RESULT: resolution of fever EVENT: Temp 39 now decrease to 38.3    BRIEF HPI: 83 year old, Male from home with PMH of BPH w/ urinary retention, stroke (2013), emphysema, afib (not on AC), dementia, erectile dysfunction, vertigo, leukocytosis of unknown origin. Prior to admission was being worked up at [facility] Dr. 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  Presented s/p fall on the way to his Cardiologist.  Admitted for Ambulatory dysfunction and malignancy workup. Pt now DNR/DNI and comfort measures pending LTC with Hospice transfer.      OBJECTIVE:  Vital Signs Last 24 Hrs  T(C): 38.3 (25 Jan 2024 22:53), Max: 39 (25 Jan 2024 20:48)  T(F): 101 (25 Jan 2024 22:53), Max: 102.2 (25 Jan 2024 20:48)  HR: 114 (25 Jan 2024 20:48) (82 - 114)  BP: 94/45 (25 Jan 2024 20:48) (94/45 - 107/53)  BP(mean): --  RR: 19 (25 Jan 2024 20:48) (19 - 20)  SpO2: 84% (25 Jan 2024 20:48) (84% - 94%)    Parameters below as of 25 Jan 2024 20:48  Patient On (Oxygen Delivery Method): nasal cannula  O2 Flow (L/min): 3      FOCUSED PHYSICAL EXAM:  NEURO: non interactive  CV: S1 S2, tach  RESP: Even, unlabored    PLAN:   Cooling measures  Cont acetaminophen  Suppository  650 tony GRAM(s) Rectal every 6 hours PRN Temp greater or equal to 38C (100.4F)    FOLLOW UP / RESULT: resolution of fever

## 2024-01-25 NOTE — PROGRESS NOTE ADULT - SUBJECTIVE AND OBJECTIVE BOX
Patient is a 83y old  Male who presents with a chief complaint of frequent falls, weakness (24 Jan 2024 11:22)      INTERVAL HPI/OVERNIGHT EVENTS: no acute events overnight       REVIEW OF SYSTEMS: unable to assess due to pt mental status     T(C): 36.4 (01-25-24 @ 12:06), Max: 37 (01-25-24 @ 04:59)  HR: 93 (01-25-24 @ 12:06) (82 - 102)  BP: 107/53 (01-25-24 @ 12:06) (102/54 - 107/53)  RR: 19 (01-25-24 @ 12:06) (19 - 20)  SpO2: 94% (01-25-24 @ 12:06) (91% - 94%)  Wt(kg): --Vital Signs Last 24 Hrs  T(C): 36.4 (25 Jan 2024 12:06), Max: 37 (25 Jan 2024 04:59)  T(F): 97.5 (25 Jan 2024 12:06), Max: 98.6 (25 Jan 2024 04:59)  HR: 93 (25 Jan 2024 12:06) (82 - 102)  BP: 107/53 (25 Jan 2024 12:06) (102/54 - 107/53)  BP(mean): --  RR: 19 (25 Jan 2024 12:06) (19 - 20)  SpO2: 94% (25 Jan 2024 12:06) (91% - 94%)    Parameters below as of 25 Jan 2024 12:06  Patient On (Oxygen Delivery Method): nasal cannula  O2 Flow (L/min): 3    MEDICATIONS  (STANDING):  chlorhexidine 2% Cloths 1 Application(s) Topical <User Schedule>  dextrose 5%. 1000 milliLiter(s) (75 mL/Hr) IV Continuous <Continuous>  fentaNYL   Patch  12 MICROgram(s)/Hr 1 Patch Transdermal every 72 hours    MEDICATIONS  (PRN):  bisacodyl Suppository 10 milliGRAM(s) Rectal at bedtime PRN Constipation  LORazepam   Injectable 1 milliGRAM(s) IV Push every 6 hours PRN Agitation  morphine  - Injectable 0.5 milliGRAM(s) IV Push every 4 hours PRN Severe Pain (7 - 10)      PHYSICAL EXAM:  GENERAL: NAD  EYES: clear conjunctiva; EOMI  ENMT: Moist mucous membranes  NECK: Supple, No JVD, Normal thyroid  CHEST/LUNG: Clear to auscultation bilaterally; No rales, rhonchi, wheezing, or rubs  HEART: S1, S2, Regular rate and rhythm  ABDOMEN: Soft, Nontender, Nondistended; Bowel sounds present  NEURO: lethargic  EXTREMITIES: No LE edema, no calf tenderness  LYMPH: No lymphadenopathy noted  SKIN: No rashes or lesions    Consultant(s) Notes Reviewed:  [x ] YES  [ ] NO  Care Discussed with Consultants/Other Providers [ x] YES  [ ] NO    LABS:    CAPILLARY BLOOD GLUCOSE      RADIOLOGY & ADDITIONAL TESTS:    Imaging Personally Reviewed:  [x ] YES  [ ] NO

## 2024-01-25 NOTE — PROGRESS NOTE ADULT - REASON FOR ADMISSION
frequent falls, weakness

## 2024-01-25 NOTE — PROGRESS NOTE ADULT - PROVIDER SPECIALTY LIST ADULT
Cardiology
Heme/Onc
Internal Medicine
Palliative Care
Palliative Care
Surgery
Surgery
Cardiology
Hospitalist
Internal Medicine
Internal Medicine
Cardiology
Internal Medicine
Palliative Care
Palliative Care
Cardiology
Cardiology
Hospitalist
Internal Medicine
Internal Medicine
Cardiology
Heme/Onc
Palliative Care
Surgery
Surgery
Cardiology
Heme/Onc
Internal Medicine
Hospitalist
Internal Medicine
Heme/Onc
Internal Medicine

## 2024-01-26 NOTE — DISCHARGE NOTE FOR THE EXPIRED PATIENT - HOSPITAL COURSE
83 year old, Male from home with PMH of BPH w/ urinary retention, stroke (2013), emphysema, Afib (not on AC), dementia, erectile dysfunction, vertigo, leukocytosis of unknown origin. Prior to admission was being worked up at [facility] Dr. 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  Presented s/p fall on the way to his Cardiologist.  Admitted for Ambulatory dysfunction and malignancy workup. Pt now DNR/DNI and comfort measures pending LTC with Hospice transfer.  CT Chest showed extensive progressive metastatic burden to the lungs. Not a candidate for excisional biopsy. Unable to tolerate MR for Needle biopsy  Called to see patient for unresponsiveness. On exam the patient did not respond to verbal, physical or noxious stimuli. Absent heart and breath sounds. Absent central and  peripheral pulses. Pupils fixed and dilated, no corneal reflex. Pronounced dead at 0135 AM. Attending Dr Brito informed. Next of kin, wife Sadie Hernandez notified. Autopsy denied. Organ donation confirmation number [id number].

## 2024-06-07 NOTE — PROVIDER CONTACT NOTE (CRITICAL VALUE NOTIFICATION) - PERSON GIVING RESULT:
Please bring all medicines, vitamins, and herbal supplements with you when you come to the office.    Prescriptions will not be filled unless you are compliant with your follow up appointments or have a follow up appointment scheduled as per instruction of your physician. Refills should be requested at the time of your visit. Fall Prevention Education Given    Bethanie Tony/ Lab
